# Patient Record
Sex: MALE | Race: WHITE | NOT HISPANIC OR LATINO | Employment: FULL TIME | URBAN - METROPOLITAN AREA
[De-identification: names, ages, dates, MRNs, and addresses within clinical notes are randomized per-mention and may not be internally consistent; named-entity substitution may affect disease eponyms.]

---

## 2017-10-09 ENCOUNTER — OFFICE VISIT - HEALTHEAST (OUTPATIENT)
Dept: FAMILY MEDICINE | Facility: CLINIC | Age: 52
End: 2017-10-09

## 2017-10-09 DIAGNOSIS — K60.2 ANAL FISSURE: ICD-10-CM

## 2017-10-09 DIAGNOSIS — Z00.00 VISIT FOR PREVENTIVE HEALTH EXAMINATION: ICD-10-CM

## 2017-10-09 ASSESSMENT — MIFFLIN-ST. JEOR: SCORE: 1799.02

## 2017-10-30 ENCOUNTER — RECORDS - HEALTHEAST (OUTPATIENT)
Dept: ADMINISTRATIVE | Facility: OTHER | Age: 52
End: 2017-10-30

## 2018-03-29 ENCOUNTER — RECORDS - HEALTHEAST (OUTPATIENT)
Dept: ADMINISTRATIVE | Facility: OTHER | Age: 53
End: 2018-03-29

## 2018-09-11 ENCOUNTER — OFFICE VISIT - HEALTHEAST (OUTPATIENT)
Dept: FAMILY MEDICINE | Facility: CLINIC | Age: 53
End: 2018-09-11

## 2018-09-11 DIAGNOSIS — Z13.29 SCREENING FOR ENDOCRINE, NUTRITIONAL, METABOLIC AND IMMUNITY DISORDER: ICD-10-CM

## 2018-09-11 DIAGNOSIS — Z13.228 SCREENING FOR ENDOCRINE, NUTRITIONAL, METABOLIC AND IMMUNITY DISORDER: ICD-10-CM

## 2018-09-11 DIAGNOSIS — Z00.00 VISIT FOR PREVENTIVE HEALTH EXAMINATION: ICD-10-CM

## 2018-09-11 DIAGNOSIS — Z13.21 SCREENING FOR ENDOCRINE, NUTRITIONAL, METABOLIC AND IMMUNITY DISORDER: ICD-10-CM

## 2018-09-11 DIAGNOSIS — Z13.0 SCREENING FOR ENDOCRINE, NUTRITIONAL, METABOLIC AND IMMUNITY DISORDER: ICD-10-CM

## 2018-09-11 LAB
ALBUMIN SERPL-MCNC: 4.1 G/DL (ref 3.5–5)
ALP SERPL-CCNC: 57 U/L (ref 45–120)
ALT SERPL W P-5'-P-CCNC: 23 U/L (ref 0–45)
ANION GAP SERPL CALCULATED.3IONS-SCNC: 9 MMOL/L (ref 5–18)
AST SERPL W P-5'-P-CCNC: 22 U/L (ref 0–40)
BILIRUB SERPL-MCNC: 0.7 MG/DL (ref 0–1)
BUN SERPL-MCNC: 18 MG/DL (ref 8–22)
CALCIUM SERPL-MCNC: 9.8 MG/DL (ref 8.5–10.5)
CHLORIDE BLD-SCNC: 103 MMOL/L (ref 98–107)
CHOLEST SERPL-MCNC: 194 MG/DL
CO2 SERPL-SCNC: 27 MMOL/L (ref 22–31)
CREAT SERPL-MCNC: 1.02 MG/DL (ref 0.7–1.3)
FASTING STATUS PATIENT QL REPORTED: YES
GFR SERPL CREATININE-BSD FRML MDRD: >60 ML/MIN/1.73M2
GLUCOSE BLD-MCNC: 80 MG/DL (ref 70–125)
HBA1C MFR BLD: 5.2 % (ref 3.5–6)
HDLC SERPL-MCNC: 47 MG/DL
LDLC SERPL CALC-MCNC: 127 MG/DL
POTASSIUM BLD-SCNC: 4.6 MMOL/L (ref 3.5–5)
PROT SERPL-MCNC: 6.9 G/DL (ref 6–8)
PSA SERPL-MCNC: 0.6 NG/ML (ref 0–3.5)
SODIUM SERPL-SCNC: 139 MMOL/L (ref 136–145)
TRIGL SERPL-MCNC: 102 MG/DL
TSH SERPL DL<=0.005 MIU/L-ACNC: 1.37 UIU/ML (ref 0.3–5)

## 2019-03-07 ENCOUNTER — COMMUNICATION - HEALTHEAST (OUTPATIENT)
Dept: FAMILY MEDICINE | Facility: CLINIC | Age: 54
End: 2019-03-07

## 2019-03-08 ENCOUNTER — AMBULATORY - HEALTHEAST (OUTPATIENT)
Dept: NURSING | Facility: CLINIC | Age: 54
End: 2019-03-08

## 2019-09-23 NOTE — PROGRESS NOTES
SUBJECTIVE:   CC: Scottie Eng is an 54 year old male who presents for preventative health visit.     Healthy Habits:     Getting at least 3 servings of Calcium per day:  Yes    Bi-annual eye exam:  Yes    Dental care twice a year:  Yes    Sleep apnea or symptoms of sleep apnea:  Sleep apnea    Diet:  Regular (no restrictions)    Frequency of exercise:  2-3 days/week    Duration of exercise:  Greater than 60 minutes    Taking medications regularly:  Yes    Medication side effects:  None    PHQ-2 Total Score: 0    Additional concerns today:  Yes    He runs and plays hockey.    Possible wart on arm, redness on neck.        Today's PHQ-2 Score:   PHQ-2 (  [employer]) 2019   Q1: Little interest or pleasure in doing things 0   Q2: Feeling down, depressed or hopeless 0   PHQ-2 Score 0   Q1: Little interest or pleasure in doing things Not at all   Q2: Feeling down, depressed or hopeless Not at all   PHQ-2 Score 0       Abuse: Current or Past(Physical, Sexual or Emotional)- No  Do you feel safe in your environment? Yes    Social History     Tobacco Use     Smoking status: Former Smoker     Packs/day: 0.00     Last attempt to quit: 1997     Years since quittin.7     Smokeless tobacco: Never Used   Substance Use Topics     Alcohol use: Not Currently     Alcohol/week: 3.0 standard drinks     Types: 3 Cans of beer per week     Frequency: Never         Alcohol Use 2019   Prescreen: >3 drinks/day or >7 drinks/week? No       Last PSA: No results found for: PSA    Reviewed orders with patient. Reviewed health maintenance and updated orders accordingly - Yes  BP Readings from Last 3 Encounters:   19 132/74   07 136/80    Wt Readings from Last 3 Encounters:   19 90.4 kg (199 lb 4 oz)   07 86.2 kg (190 lb)              Reviewed and updated as needed this visit by clinical staff  Tobacco  Allergies  Meds  Problems  Med Hx  Surg Hx  Fam Hx  Soc Hx          Reviewed and updated as  "needed this visit by Provider  Tobacco  Allergies  Meds  Problems  Med Hx  Surg Hx  Fam Hx  Soc Hx         Past Medical History:   Diagnosis Date     Gant's esophagus     followed by [facility]     GERD (gastroesophageal reflux disease)      STEPHEN (obstructive sleep apnea)     mild by sleep study, used a mandibular advancement device which broke      Past Surgical History:   Procedure Laterality Date     AS REPAIR CRUCIATE LIGAMENT,KNEE Left 2005     C ORAL SURGERY PROCEDURE  1981     SEPTOPLASTY, ENDOSCOPIC SINUS SURGERY, COMBINED  1978     STRIP VEIN         Review of Systems   Constitutional: Negative for chills and fever.   HENT: Negative for congestion, ear pain, hearing loss and sore throat.    Eyes: Negative for pain and visual disturbance.   Respiratory: Negative for cough and shortness of breath.    Cardiovascular: Negative for chest pain, palpitations and peripheral edema.   Gastrointestinal: Positive for heartburn. Negative for abdominal pain, constipation, diarrhea, hematochezia and nausea.   Genitourinary: Negative for discharge, dysuria, frequency, genital sores, hematuria, impotence and urgency.   Musculoskeletal: Negative for arthralgias, joint swelling and myalgias.   Skin: Negative for rash.   Neurological: Negative for dizziness, weakness, headaches and paresthesias.   Psychiatric/Behavioral: Positive for mood changes. The patient is not nervous/anxious.          OBJECTIVE:   /74 (BP Location: Right arm, Patient Position: Chair, Cuff Size: Adult Regular)   Pulse 59   Temp 97.3  F (36.3  C) (Oral)   Resp 16   Ht 1.848 m (6' 0.75\")   Wt 90.4 kg (199 lb 4 oz)   SpO2 95%   BMI 26.47 kg/m      Physical Exam  GENERAL: healthy, alert and no distress  EYES: Eyes grossly normal to inspection, PERRL and conjunctivae and sclerae normal  HENT: ear canals and TM's normal, nose and mouth without ulcers or lesions  NECK: no adenopathy, no asymmetry, masses, or scars and thyroid normal to " "palpation  RESP: lungs clear to auscultation - no rales, rhonchi or wheezes  CV: regular rate and rhythm, normal S1 S2, no S3 or S4, no murmur, click or rub, no peripheral edema and peripheral pulses strong  ABDOMEN: soft, nontender, no hepatosplenomegaly, no masses   MS: no gross musculoskeletal defects noted, no edema  SKIN: no suspicious lesions or rashes, solar changes of skin on neck and forearms, SK vs wart on right forearm.    NEURO: Normal strength and tone, mentation intact and speech normal  PSYCH: mentation appears normal, affect normal/bright        ASSESSMENT/PLAN:       ICD-10-CM    1. Routine general medical examination at a health care facility Z00.00 DERMATOLOGY REFERRAL   2. Gastroesophageal reflux disease, esophagitis presence not specified K21.9 omeprazole (PRILOSEC) 20 MG DR capsule   3. STEPHEN (obstructive sleep apnea) G47.33    4. Lipid screening Z13.220 Lipid panel reflex to direct LDL Fasting   5. Screening for diabetes mellitus Z13.1 GLUCOSE   6. Screening for HIV (human immunodeficiency virus) Z11.4 HIV Screening   7. Need for hepatitis C screening test Z11.59 Hepatitis C Screen Reflex to HCV RNA Quant and Genotype   8. Need for prophylactic vaccination and inoculation against influenza Z23 INFLUENZA QUAD, RECOMBINANT, P-FREE (RIV4) (FLUBLOCK) [60725]     Vaccine Administration, Initial [86110]   9. Solar aging of skin L57.8 DERMATOLOGY REFERRAL       COUNSELING:   Reviewed preventive health counseling, as reflected in patient instructions    Estimated body mass index is 26.47 kg/m  as calculated from the following:    Height as of this encounter: 1.848 m (6' 0.75\").    Weight as of this encounter: 90.4 kg (199 lb 4 oz).  Weight management plan: Discussed healthy diet and exercise guidelines        Counseling Resources:  ATP IV Guidelines  Pooled Cohorts Equation Calculator  FRAX Risk Assessment  ICSI Preventive Guidelines  Dietary Guidelines for Americans, 2010  USDA's MyPlate  ASA " Prophylaxis  Lung CA Screening    Melissa Healy MD  [facility]

## 2019-09-26 ENCOUNTER — OFFICE VISIT (OUTPATIENT)
Dept: FAMILY MEDICINE | Facility: CLINIC | Age: 54
End: 2019-09-26
Payer: COMMERCIAL

## 2019-09-26 VITALS
BODY MASS INDEX: 26.41 KG/M2 | SYSTOLIC BLOOD PRESSURE: 132 MMHG | OXYGEN SATURATION: 95 % | DIASTOLIC BLOOD PRESSURE: 74 MMHG | HEIGHT: 73 IN | RESPIRATION RATE: 16 BRPM | WEIGHT: 199.25 LBS | HEART RATE: 59 BPM | TEMPERATURE: 97.3 F

## 2019-09-26 DIAGNOSIS — Z23 NEED FOR PROPHYLACTIC VACCINATION AND INOCULATION AGAINST INFLUENZA: ICD-10-CM

## 2019-09-26 DIAGNOSIS — L57.8 SOLAR AGING OF SKIN: ICD-10-CM

## 2019-09-26 DIAGNOSIS — K21.9 GASTROESOPHAGEAL REFLUX DISEASE, ESOPHAGITIS PRESENCE NOT SPECIFIED: ICD-10-CM

## 2019-09-26 DIAGNOSIS — G47.33 OSA (OBSTRUCTIVE SLEEP APNEA): ICD-10-CM

## 2019-09-26 DIAGNOSIS — Z11.59 NEED FOR HEPATITIS C SCREENING TEST: ICD-10-CM

## 2019-09-26 DIAGNOSIS — Z11.4 SCREENING FOR HIV (HUMAN IMMUNODEFICIENCY VIRUS): ICD-10-CM

## 2019-09-26 DIAGNOSIS — Z00.00 ROUTINE GENERAL MEDICAL EXAMINATION AT A HEALTH CARE FACILITY: Primary | ICD-10-CM

## 2019-09-26 DIAGNOSIS — Z13.220 LIPID SCREENING: ICD-10-CM

## 2019-09-26 DIAGNOSIS — Z13.1 SCREENING FOR DIABETES MELLITUS: ICD-10-CM

## 2019-09-26 LAB
CHOLEST SERPL-MCNC: 180 MG/DL
GLUCOSE SERPL-MCNC: 97 MG/DL (ref 70–99)
HCV AB SERPL QL IA: NONREACTIVE
HDLC SERPL-MCNC: 49 MG/DL
HIV 1+2 AB+HIV1 P24 AG SERPL QL IA: NONREACTIVE
LDLC SERPL CALC-MCNC: 115 MG/DL
NONHDLC SERPL-MCNC: 131 MG/DL
TRIGL SERPL-MCNC: 79 MG/DL

## 2019-09-26 PROCEDURE — 87389 HIV-1 AG W/HIV-1&-2 AB AG IA: CPT | Performed by: FAMILY MEDICINE

## 2019-09-26 PROCEDURE — 90471 IMMUNIZATION ADMIN: CPT | Performed by: FAMILY MEDICINE

## 2019-09-26 PROCEDURE — 80061 LIPID PANEL: CPT | Performed by: FAMILY MEDICINE

## 2019-09-26 PROCEDURE — 90682 RIV4 VACC RECOMBINANT DNA IM: CPT | Performed by: FAMILY MEDICINE

## 2019-09-26 PROCEDURE — 99386 PREV VISIT NEW AGE 40-64: CPT | Mod: 25 | Performed by: FAMILY MEDICINE

## 2019-09-26 PROCEDURE — 86803 HEPATITIS C AB TEST: CPT | Performed by: FAMILY MEDICINE

## 2019-09-26 PROCEDURE — 82947 ASSAY GLUCOSE BLOOD QUANT: CPT | Performed by: FAMILY MEDICINE

## 2019-09-26 PROCEDURE — 36415 COLL VENOUS BLD VENIPUNCTURE: CPT | Performed by: FAMILY MEDICINE

## 2019-09-26 SDOH — HEALTH STABILITY: MENTAL HEALTH: HOW OFTEN DO YOU HAVE A DRINK CONTAINING ALCOHOL?: NEVER

## 2019-09-26 ASSESSMENT — ENCOUNTER SYMPTOMS
PARESTHESIAS: 0
DYSURIA: 0
EYE PAIN: 0
NERVOUS/ANXIOUS: 0
FEVER: 0
HEADACHES: 0
HEMATURIA: 0
CONSTIPATION: 0
DIARRHEA: 0
JOINT SWELLING: 0
CHILLS: 0
FREQUENCY: 0
NAUSEA: 0
DIZZINESS: 0
PALPITATIONS: 0
MYALGIAS: 0
HEMATOCHEZIA: 0
ABDOMINAL PAIN: 0
WEAKNESS: 0
ARTHRALGIAS: 0
SORE THROAT: 0
SHORTNESS OF BREATH: 0
COUGH: 0
HEARTBURN: 1

## 2019-09-26 ASSESSMENT — MIFFLIN-ST. JEOR: SCORE: 1793.7

## 2019-10-01 NOTE — RESULT ENCOUNTER NOTE
Please send results letter:  Your lab results all look good.  Your LDL (bad) cholesterol is a tad high but overall your lipid panel is quite nice.  Your fasting blood sugar is normal and your HIV and Hepatitis C screening tests are negative.    I am sorry I did not address your mood concerns.  If you continue to have mood concerns please schedule another visit with me so we can discuss that further.  Melissa Healy MD

## 2019-11-12 ENCOUNTER — TRANSFERRED RECORDS (OUTPATIENT)
Dept: HEALTH INFORMATION MANAGEMENT | Facility: CLINIC | Age: 54
End: 2019-11-12

## 2019-12-10 ENCOUNTER — TRANSFERRED RECORDS (OUTPATIENT)
Dept: HEALTH INFORMATION MANAGEMENT | Facility: CLINIC | Age: 54
End: 2019-12-10

## 2020-04-09 ENCOUNTER — VIRTUAL VISIT (OUTPATIENT)
Dept: FAMILY MEDICINE | Facility: CLINIC | Age: 55
End: 2020-04-09
Payer: COMMERCIAL

## 2020-04-09 VITALS — WEIGHT: 200 LBS | HEIGHT: 73 IN | BODY MASS INDEX: 26.51 KG/M2

## 2020-04-09 DIAGNOSIS — J02.9 ACUTE PHARYNGITIS, UNSPECIFIED ETIOLOGY: Primary | ICD-10-CM

## 2020-04-09 PROCEDURE — 99213 OFFICE O/P EST LOW 20 MIN: CPT | Mod: 95 | Performed by: FAMILY MEDICINE

## 2020-04-09 RX ORDER — AMOXICILLIN 500 MG/1
500 CAPSULE ORAL 3 TIMES DAILY
Qty: 30 CAPSULE | Refills: 0 | Status: SHIPPED | OUTPATIENT
Start: 2020-04-09 | End: 2020-04-19

## 2020-04-09 ASSESSMENT — MIFFLIN-ST. JEOR: SCORE: 1801.07

## 2020-04-09 NOTE — PROGRESS NOTES
"Scottie Eng is a 54 year old male who is being evaluated via a billable video visit.      The patient has been notified of following:     \"This video visit will be conducted via a call between you and your physician/provider. We have found that certain health care needs can be provided without the need for an in-person physical exam.  This service lets us provide the care you need with a video conversation.  If a prescription is necessary we can send it directly to your pharmacy.  If lab work is needed we can place an order for that and you can then stop by our lab to have the test done at a later time.    Video visits are billed at different rates depending on your insurance coverage.  Please reach out to your insurance provider with any questions.    If during the course of the call the physician/provider feels a video visit is not appropriate, you will not be charged for this service.\"    Patient has given verbal consent for Video visit? Yes    How would you like to obtain your AVS? E-Mail (inform patient AVS not encrypted)    Patient would like the video invitation sent by: Text to cell phone: [phone]      Subjective     Scottie Eng is a 54 year old male who presents to clinic today for the following health issues:    HPI   Initiates visit for increasing ST x 2 days.  Daughter with ST as well- being treated with amoxicillin for presumed strep.  NO fever or chills.  Increased pain with swallowing.  Tolerating po well.       Video Start Time: 1200p        Patient Active Problem List   Diagnosis     Gant's esophagus     GERD (gastroesophageal reflux disease)     STEPHEN (obstructive sleep apnea)     Past Surgical History:   Procedure Laterality Date     AS REPAIR CRUCIATE LIGAMENT,KNEE Left 2005     C ORAL SURGERY PROCEDURE  1981     SEPTOPLASTY, ENDOSCOPIC SINUS SURGERY, COMBINED  1978     STRIP VEIN         Social History     Tobacco Use     Smoking status: Former Smoker     Packs/day: 0.00 " "    Last attempt to quit:      Years since quittin.2     Smokeless tobacco: Never Used   Substance Use Topics     Alcohol use: Not Currently     Alcohol/week: 3.0 standard drinks     Types: 3 Cans of beer per week     Frequency: Never     Family History   Problem Relation Age of Onset     Ovarian Cancer Mother      Osteoporosis Mother      Cerebrovascular Disease Mother      Esophageal Cancer Father 43     Diabetes Type 2  Brother      Tuberculosis Maternal Grandfather      Myocardial Infarction Paternal Grandmother 80     Alcoholism Paternal Grandfather          Current Outpatient Medications   Medication Sig Dispense Refill     amoxicillin (AMOXIL) 500 MG capsule Take 1 capsule (500 mg) by mouth 3 times daily for 10 days 30 capsule 0     omeprazole (PRILOSEC) 20 MG DR capsule        Allergies   Allergen Reactions     Seasonal Allergies      Recent Labs   Lab Test 19  0931   *   HDL 49   TRIG 79      BP Readings from Last 3 Encounters:   19 132/74   07 136/80    Wt Readings from Last 3 Encounters:   20 90.7 kg (200 lb)   19 90.4 kg (199 lb 4 oz)   07 86.2 kg (190 lb)                      Reviewed and updated as needed this visit by Provider         Review of Systems   ROS COMP: Constitutional, HEENT, cardiovascular, pulmonary, gi and gu systems are negative, except as otherwise noted.      Objective    There were no vitals taken for this visit.  Estimated body mass index is 26.47 kg/m  as calculated from the following:    Height as of 19: 1.848 m (6' 0.75\").    Weight as of 19: 90.4 kg (199 lb 4 oz).  Physical Exam   GENERAL: healthy, alert and no distress  EYES: Eyes grossly normal to inspection, PERRL and conjunctivae and sclerae normal  NECK: no asymmetry or masses  RESP: normal respiratory effort  MS: no gross musculoskeletal defects noted  NEURO: Normal strength and tone, mentation intact and speech normal  PSYCH: mentation appears normal, affect " normal/bright        Assessment & Plan     1. Acute pharyngitis, unspecified etiology    - amoxicillin (AMOXIL) 500 MG capsule; Take 1 capsule (500 mg) by mouth 3 times daily for 10 days  Dispense: 30 capsule; Refill: 0     Will treat empirically in time of pandemic for possible strep.  Continue with increased fluids, rest, NSAIDs/Tylenol prn comfort.  Follow-up if no change or worsening symptoms prn.    Mone Roberts MD  [facility]      Video-Visit Details    Type of service:  Video Visit    Video End Time (time video stopped): 1207- converted to phone as AUDIO could not be heard by me from his computer on either [employer] or [email or web address]    Originating Location (pt. Location): Home    Distant Location (provider location): home    Mode of Communication:  Video Conference via [email or web address]        Mone Roberts MD

## 2020-10-13 ENCOUNTER — TRANSFERRED RECORDS (OUTPATIENT)
Dept: HEALTH INFORMATION MANAGEMENT | Facility: CLINIC | Age: 55
End: 2020-10-13

## 2020-11-02 ENCOUNTER — TRANSFERRED RECORDS (OUTPATIENT)
Dept: HEALTH INFORMATION MANAGEMENT | Facility: CLINIC | Age: 55
End: 2020-11-02

## 2020-12-03 ENCOUNTER — IMMUNIZATION (OUTPATIENT)
Dept: NURSING | Facility: CLINIC | Age: 55
End: 2020-12-03
Payer: COMMERCIAL

## 2020-12-03 DIAGNOSIS — Z23 NEED FOR PROPHYLACTIC VACCINATION AND INOCULATION AGAINST INFLUENZA: Primary | ICD-10-CM

## 2020-12-03 PROCEDURE — 90471 IMMUNIZATION ADMIN: CPT

## 2020-12-03 PROCEDURE — 90682 RIV4 VACC RECOMBINANT DNA IM: CPT

## 2021-03-27 ENCOUNTER — HEALTH MAINTENANCE LETTER (OUTPATIENT)
Age: 56
End: 2021-03-27

## 2021-04-14 ENCOUNTER — OFFICE VISIT (OUTPATIENT)
Dept: NURSING | Facility: CLINIC | Age: 56
End: 2021-04-14
Payer: COMMERCIAL

## 2021-04-14 PROCEDURE — 91300 PR COVID VAC PFIZER DIL RECON 30 MCG/0.3 ML IM: CPT

## 2021-04-14 PROCEDURE — 0001A PR COVID VAC PFIZER DIL RECON 30 MCG/0.3 ML IM: CPT

## 2021-05-05 ENCOUNTER — IMMUNIZATION (OUTPATIENT)
Dept: NURSING | Facility: CLINIC | Age: 56
End: 2021-05-05
Attending: INTERNAL MEDICINE
Payer: COMMERCIAL

## 2021-05-05 PROCEDURE — 91300 PR COVID VAC PFIZER DIL RECON 30 MCG/0.3 ML IM: CPT

## 2021-05-05 PROCEDURE — 0002A PR COVID VAC PFIZER DIL RECON 30 MCG/0.3 ML IM: CPT

## 2021-05-26 ENCOUNTER — RECORDS - HEALTHEAST (OUTPATIENT)
Dept: ADMINISTRATIVE | Facility: CLINIC | Age: 56
End: 2021-05-26

## 2021-05-31 VITALS — WEIGHT: 201 LBS | BODY MASS INDEX: 26.64 KG/M2 | HEIGHT: 73 IN

## 2021-06-02 VITALS — BODY MASS INDEX: 26.79 KG/M2 | WEIGHT: 202.5 LBS

## 2021-06-13 NOTE — PROGRESS NOTES
Assessment:      Healthy male exam.        Plan:     1.  Intermittent musculoskeletal pain, mostly localized in the knees, elbows hips and back, usually better with activity modification, rest as needed and anti-inflammatory.  Mild anxiety while exercising, usually better with deep breathing and relaxation technique.  2. Patient Counseling:  --Nutrition: Stressed importance of moderation in sodium/caffeine intake, saturated fat and cholesterol, caloric balance, sufficient intake of fresh fruits, vegetables, fiber, calcium, iron.  --Discussed the issue of calcium supplement, and the daily use of baby aspirin.  --Exercise: Stressed the importance of regular exercise.   --Substance Abuse: Discussed cessation/primary prevention of tobacco, alcohol, or other drug use; driving or other dangerous activities under the influence; availability of treatment for abuse.    --Sexuality: Discussed sexually transmitted diseases, partner selection, use of condoms, avoidance of unintended pregnancy.  --Injury prevention: Discussed safety belts, safety helmets, smoke detector, smoking near bedding or upholstery.   --Dental health: Discussed importance of regular tooth brushing, flossing, and dental visits.  --Immunizations reviewed.  --Discussed timing and benefits of screening colonoscopy and alternative options.  --After hours service discussed with patient             -- The following high BMI interventions were performed this visit: encouragement to exercise    3. Discussed the patient's BMI with him.  The BMI is above average; BMI management plan is completed  4. Follow up as needed for acute illness     Subjective:      Scottie Eng is a 52 y.o. male who presents for an annual exam. The patient reports that there is not domestic violence in his life.   Occasional musculoskeletal pain, but usually better with rest and anti-inflammatory as needed.  Frequent episode of painful defecation, history of anal fissure, asking to  be seen by proctologist.  Occasional anxiety usually triggered by physical activities with throat tightness, usually better with relaxation and deep breathing.  Reviewed blood work from last year, they were all within acceptable limits.    Healthy Habits:   Regular Exercise: Yes  Sunscreen Use: Yes, has seen the dermatologist in the past.  Healthy Diet: Yes  Dental Visits Regularly: Yes  Seat Belt: Yes  Sexually active: Yes  Monthly Self Testicular Exams:  Yes  Hemoccults: N/A  Flex Sig: N/A  Colonoscopy: Yes  Lipid Profile: Yes  Glucose Screen: Yes  Prevention of Osteoporosis: Yes  Last Dexa: N/A  Guns at Home:  N/A      Immunization History   Administered Date(s) Administered     Hep A, historic 12/22/2009, 11/20/2012     Influenza, seasonal,quad inj 36+ mos 10/07/2016     Influenza,seasonal quad, PF, 36+MOS 11/10/2015     Tdap 12/20/2006     Immunization status: due today.  Vision Screening:both eyes, does wear contact lenses.  Hearing: PASS     Current Outpatient Prescriptions   Medication Sig Dispense Refill     omeprazole (PRILOSEC) 20 MG capsule        No current facility-administered medications for this visit.      Past Medical History:   Diagnosis Date     Gant's esophagus      GERD (gastroesophageal reflux disease)      Past Surgical History:   Procedure Laterality Date     KNEE SURGERY       maxillafacial       maxillofacial       NASAL SEPTUM SURGERY       VARICOSE VEIN SURGERY       Hay fever and allergy relief  Family History   Problem Relation Age of Onset     Esophageal cancer Father      Cervical cancer Mother      Tuberculosis Maternal Grandfather      Diabetes type II Brother      No Medical Problems Sister      No Medical Problems Daughter      No Medical Problems Son      Heart disease Paternal Uncle      Social History     Social History     Marital status:      Spouse name: N/A     Number of children: N/A     Years of education: N/A     Occupational History     Not on file.  "    Social History Main Topics     Smoking status: Former Smoker     Start date: 11/10/1981     Quit date: 11/10/1996     Smokeless tobacco: Never Used     Alcohol use 0.5 oz/week     1 drink(s) per week     Drug use: No     Sexual activity: Yes     Partners: Female     Other Topics Concern     Not on file     Social History Narrative     2 children      No specialty comments available.  Review of Systems  General:  Denies problem  Eyes: Denies problem  Ears/Nose/Throat: Denies problem  Cardiovascular: Denies problem  Respiratory:  Denies problem  Gastrointestinal:  Denies problem  Genitourinary: Denies problem  Musculoskeletal:  Denies problem  Skin: Denies problem  Neurologic: Denies problem  Psychiatric: Denies problem  Endocrine: Denies problem  Heme/Lymphatic: Denies problem   Allergic/Immunologic: Denies problem        Objective:     Vitals:    10/09/17 0803   BP: 122/78   Pulse: 68   Resp: 13   Temp: 98.1  F (36.7  C)   TempSrc: Oral   Weight: 201 lb (91.2 kg)   Height: 6' 0.9\" (1.852 m)     Body mass index is 26.59 kg/(m^2).    Physical  General Appearance: Alert, cooperative, no distress, appears stated age  Head: Normocephalic, without obvious abnormality, atraumatic  Eyes: PERRL, conjunctiva/corneas clear, EOM's intact  Ears: Normal TM's and external ear canals, both ears  Nose: Nares normal, septum midline,mucosa normal, no drainage  Throat: Lips, mucosa, and tongue normal; teeth and gums normal  Neck: Supple, symmetrical, trachea midline, no adenopathy;  thyroid: not enlarged, symmetric, no tenderness/mass/nodules; no carotid bruit or JVD  Back: Symmetric, no curvature, ROM normal, no CVA tenderness  Lungs: Clear to auscultation bilaterally, respirations unlabored  Heart: Regular rate and rhythm, S1 and S2 normal, no murmur, rub, or gallop,  Abdomen: Soft, non-tender, bowel sounds active all four quadrants,  no masses, no organomegaly  Genitourinary: Penis normal. Right and left testis are " descended.No palpable masses.   Rectal: Very small fissure in the anal area around 7:00 area,no digital rectal exam was performed today.  Musculoskeletal: Normal range of motion. No joint swelling or deformity.   Extremities: Extremities normal, atraumatic, no cyanosis or edema  Skin: Skin color, texture, turgor normal, no rashes or lesions  Lymph nodes: Cervical, supraclavicular, and axillary nodes normal  Neurologic: He is alert. He has normal reflexes.   Psychiatric: He has a normal mood and affect.        Snow Wan MD

## 2021-06-20 NOTE — PROGRESS NOTES
Assessment:      Healthy male exam.        Plan:     1.  Mild Gant esophagus followed by a gastroenterologist, controlled with omeprazole.  2. Patient Counseling:  --Nutrition: Stressed importance of moderation in sodium/caffeine intake, saturated fat and cholesterol, caloric balance, sufficient intake of fresh fruits, vegetables, fiber, calcium, iron.  --Discussed the issue of calcium supplement, and the daily use of baby aspirin.  --Exercise: Stressed the importance of regular exercise.   --Substance Abuse: Discussed cessation/primary prevention of tobacco, alcohol, or other drug use; driving or other dangerous activities under the influence; availability of treatment for abuse.    --Sexuality: Discussed sexually transmitted diseases, partner selection, use of condoms, avoidance of unintended pregnancy.  --Injury prevention: Discussed safety belts, safety helmets, smoke detector, smoking near bedding or upholstery.   --Dental health: Discussed importance of regular tooth brushing, flossing, and dental visits.  --Immunizations reviewed.  --Discussed timing and benefits of screening colonoscopy and alternative options.  --After hours service discussed with patient             -- The following high BMI interventions were performed this visit: encouragement to exercise    3. Discussed the patient's BMI with him.  The BMI is above average; BMI management plan is completed  4. Follow up as needed for acute illness     Subjective:      Scottie Eng is a 53 y.o. male who presents for an annual exam. The patient reports that there is not domestic violence in his life.   Usually sees gastroenterology for Gant esophagus, taking omeprazole to control his symptoms with good response .  Healthy Habits:   Regular Exercise: Yes  Sunscreen Use: Yes  Healthy Diet: Yes  Dental Visits Regularly: Yes  Seat Belt: Yes  Sexually active: Yes  Monthly Self Testicular Exams:  Yes  Hemoccults: N/A  Flex Sig: N/A  Colonoscopy:  Yes  Lipid Profile: Yes  Glucose Screen: Yes  Prevention of Osteoporosis: Yes  Last Dexa: N/A  Guns at Home:  N/A      Immunization History   Administered Date(s) Administered     Hep A, Adult IM (19yr & older) 12/22/2009, 11/20/2012     Hep A, historic 12/22/2009, 11/20/2012     Influenza, inj, historic,unspecified 10/07/2016     Influenza, seasonal,quad inj 36+ mos 10/07/2016     Influenza,seasonal quad, PF, 36+MOS 11/10/2015, 10/09/2017, 09/11/2018     Tdap 12/20/2006, 10/09/2017     ZOSTER, RECOMBINANT, IM 09/11/2018     Immunization status: stated as current, but no records available.  Vision Screening:both eyes  Hearing: PASS     Current Outpatient Prescriptions   Medication Sig Dispense Refill     omeprazole (PRILOSEC) 20 MG capsule        No current facility-administered medications for this visit.      Past Medical History:   Diagnosis Date     Gant's esophagus      GERD (gastroesophageal reflux disease)      Past Surgical History:   Procedure Laterality Date     KNEE SURGERY       maxillafacial       maxillofacial       NASAL SEPTUM SURGERY       VARICOSE VEIN SURGERY       Hay fever and allergy relief  Family History   Problem Relation Age of Onset     Esophageal cancer Father      Cervical cancer Mother      Tuberculosis Maternal Grandfather      Diabetes type II Brother      No Medical Problems Sister      No Medical Problems Daughter      No Medical Problems Son      Heart disease Paternal Uncle      Social History     Social History     Marital status:      Spouse name: N/A     Number of children: N/A     Years of education: N/A     Occupational History     Not on file.     Social History Main Topics     Smoking status: Former Smoker     Start date: 11/10/1981     Quit date: 11/10/1996     Smokeless tobacco: Never Used     Alcohol use 0.5 oz/week     1 drink(s) per week     Drug use: No     Sexual activity: Yes     Partners: Female     Other Topics Concern     Not on file     Social History  Narrative     2 children      No specialty comments available.  Review of Systems  General:  Denies problem  Eyes: Denies problem  Ears/Nose/Throat: Denies problem  Cardiovascular: Denies problem  Respiratory:  Denies problem  Gastrointestinal:  Denies problem  Genitourinary: Denies problem  Musculoskeletal:  Denies problem  Skin: Denies problem  Neurologic: Denies problem  Psychiatric: Denies problem  Endocrine: Denies problem  Heme/Lymphatic: Denies problem   Allergic/Immunologic: Denies problem        Objective:     Vitals:    09/11/18 0802   BP: 136/88   Pulse: 81   Resp: 16   SpO2: 95%   Weight: 202 lb 8 oz (91.9 kg)   PainSc: 0-No pain     Body mass index is 26.79 kg/(m^2).    Physical  General Appearance: Alert, cooperative, no distress, appears stated age  Head: Normocephalic, without obvious abnormality, atraumatic  Eyes: PERRL, conjunctiva/corneas clear, EOM's intact  Ears: Normal TM's and external ear canals, both ears  Nose: Nares normal, septum midline,mucosa normal, no drainage  Throat: Lips, mucosa, and tongue normal; teeth and gums normal  Neck: Supple, symmetrical, trachea midline, no adenopathy;  thyroid: not enlarged, symmetric, no tenderness/mass/nodules; no carotid bruit or JVD  Back: Symmetric, no curvature, ROM normal, no CVA tenderness  Lungs: Clear to auscultation bilaterally, respirations unlabored  Heart: Regular rate and rhythm, S1 and S2 normal, no murmur, rub, or gallop,  Abdomen: Soft, non-tender, bowel sounds active all four quadrants,  no masses, no organomegaly  Genitourinary: Penis normal. Right and left testis are descended.No palpable masses.   Rectal: No visible external lesion  Musculoskeletal: Normal range of motion. No joint swelling or deformity.   Extremities: Extremities normal, atraumatic, no cyanosis or edema  Skin: Skin color, texture, turgor normal, no rashes or lesions  Lymph nodes: Cervical, supraclavicular, and axillary nodes normal  Neurologic: He is alert.  He has normal reflexes.   Psychiatric: He has a normal mood and affect.        Snow Wan MD        Scottie was seen today for annual exam.    Diagnoses and all orders for this visit:    Visit for preventive health examination    Screening for endocrine, nutritional, metabolic and immunity disorder  -     Glycosylated Hemoglobin A1c  -     PSA, Annual Screen (Prostatic-Specific Antigen)  -     Comprehensive Metabolic Panel  -     Lipid Cascade FASTING  -     Thyroid Stimulating Hormone (TSH)    Other orders  -     Influenza, Seasonal Quad, Preservative Free 36+ Months  -     Varicella Zoster, Recombinant Vaccine IM; Standing  -     Varicella Zoster, Recombinant Vaccine IM

## 2021-06-24 NOTE — TELEPHONE ENCOUNTER
Patient needs second round of Shingrix vaccine (first round given in October). OK to leave detailed voice message.

## 2021-06-24 NOTE — TELEPHONE ENCOUNTER
Ask pt to call back to make a nurse visit for second shingrix vaccine.  When pt calls, please assist.  Closing encounter.

## 2021-09-05 ENCOUNTER — HEALTH MAINTENANCE LETTER (OUTPATIENT)
Age: 56
End: 2021-09-05

## 2021-10-19 ENCOUNTER — TRANSFERRED RECORDS (OUTPATIENT)
Dept: HEALTH INFORMATION MANAGEMENT | Facility: CLINIC | Age: 56
End: 2021-10-19
Payer: COMMERCIAL

## 2021-11-01 NOTE — PATIENT INSTRUCTIONS
I kindly request that you check your MyChart prior to all appointments with me and complete any assigned questionnaires ahead of time.  (Or you can come a bit early to your appointment and complete questionnaires on one of our tablets.)  This frees up more of our designated visit time to address your health issues. If you have not already done so, I encourage you to sign up for Mychart ([email or web address]).  This will allow you to review your results, securely communicate with your provider care team, and schedule virtual visits as well.    FYI:  I do telehealth (video) visits exclusively on Wednesdays.  I still do in-person visits at [facility] ([phone]) on Mondays, Tuesdays and Thursdays.  You can schedule a video visit for many conditions.  Please follow this link:  [email or web address]    To schedule any ordered imaging studies (including mammogram) you can call [facility] Scheduling at [phone].  If you are scheduling a DEXA (bone density) scan please do NOT schedule this at the [facility].  Please ask that it be done at [facility] in [place].  Other preferred DEXA locations include [place] (at the [facility]), [place], or [place].      Preventive Health Recommendations  Male Ages 50 - 64    Yearly exam:             See your health care provider every year in order to  o   Review health changes.   o   Discuss preventive care.    o   Review your medicines if your doctor has prescribed any.     Have a cholesterol test every 5 years, or more frequently if you are at risk for high cholesterol/heart disease.     Have a diabetes test (fasting glucose) every three years. If you are at risk for diabetes, you should have this test more often.     Have a colonoscopy at age 50, or have a yearly FIT test (stool test). These exams will check for colon cancer.       Talk with your health care provider about whether or not a prostate cancer screening test (PSA) is right for you.    You should be tested each year for STDs (sexually transmitted diseases), if you re at risk.     Shots: Get a flu shot each year. Get a tetanus shot every 10 years.     Nutrition:    Eat at least 5 servings of fruits and vegetables daily.     Eat whole-grain bread, whole-wheat pasta and brown rice instead of white grains and rice.     Get adequate Calcium and Vitamin D.     Lifestyle    Exercise for at least 150 minutes a week (30 minutes a day, 5 days a week). This will help you control your weight and prevent disease.     Limit alcohol to one drink per day.     No smoking.     Wear sunscreen to prevent skin cancer.     See your dentist every six months for an exam and cleaning.     See your eye doctor every 1 to 2 years.

## 2021-11-02 ENCOUNTER — OFFICE VISIT (OUTPATIENT)
Dept: FAMILY MEDICINE | Facility: CLINIC | Age: 56
End: 2021-11-02
Payer: COMMERCIAL

## 2021-11-02 ENCOUNTER — TELEPHONE (OUTPATIENT)
Dept: FAMILY MEDICINE | Facility: CLINIC | Age: 56
End: 2021-11-02

## 2021-11-02 VITALS
HEIGHT: 73 IN | DIASTOLIC BLOOD PRESSURE: 86 MMHG | SYSTOLIC BLOOD PRESSURE: 138 MMHG | OXYGEN SATURATION: 96 % | TEMPERATURE: 97.4 F | BODY MASS INDEX: 25.72 KG/M2 | WEIGHT: 194.08 LBS | HEART RATE: 56 BPM

## 2021-11-02 DIAGNOSIS — M25.551 BILATERAL HIP PAIN: ICD-10-CM

## 2021-11-02 DIAGNOSIS — M25.562 CHRONIC PAIN OF BOTH KNEES: ICD-10-CM

## 2021-11-02 DIAGNOSIS — Z23 NEED FOR PROPHYLACTIC VACCINATION AND INOCULATION AGAINST INFLUENZA: ICD-10-CM

## 2021-11-02 DIAGNOSIS — Z00.00 ROUTINE GENERAL MEDICAL EXAMINATION AT A HEALTH CARE FACILITY: Primary | ICD-10-CM

## 2021-11-02 DIAGNOSIS — G89.29 CHRONIC PAIN OF BOTH KNEES: ICD-10-CM

## 2021-11-02 DIAGNOSIS — M25.552 BILATERAL HIP PAIN: ICD-10-CM

## 2021-11-02 DIAGNOSIS — M25.561 CHRONIC PAIN OF BOTH KNEES: ICD-10-CM

## 2021-11-02 PROCEDURE — 99396 PREV VISIT EST AGE 40-64: CPT | Performed by: FAMILY MEDICINE

## 2021-11-02 ASSESSMENT — ENCOUNTER SYMPTOMS
NERVOUS/ANXIOUS: 0
HEMATOCHEZIA: 0
CONSTIPATION: 0
EYE PAIN: 0
HEMATURIA: 0
PARESTHESIAS: 0
WEAKNESS: 0
FEVER: 0
ABDOMINAL PAIN: 0
DYSURIA: 0
DIZZINESS: 0
HEARTBURN: 0
ARTHRALGIAS: 1
SHORTNESS OF BREATH: 0
MYALGIAS: 0
SORE THROAT: 0
FREQUENCY: 0
DIARRHEA: 0
HEADACHES: 0
PALPITATIONS: 0
JOINT SWELLING: 0
COUGH: 0
NAUSEA: 0
CHILLS: 0

## 2021-11-02 ASSESSMENT — MIFFLIN-ST. JEOR: SCORE: 1764.22

## 2021-11-02 NOTE — PROGRESS NOTES
SUBJECTIVE:   CC: Scottie Eng is an 56 year old male who presents for preventative health visit.     Patient has been advised of split billing requirements and indicates understanding: Yes     Healthy Habits:     Getting at least 3 servings of Calcium per day:  Yes    Bi-annual eye exam:  Yes    Dental care twice a year:  Yes    Sleep apnea or symptoms of sleep apnea:  Daytime drowsiness    Diet:  Regular (no restrictions)    Frequency of exercise:  2-3 days/week    Duration of exercise:  15-30 minutes    Taking medications regularly:  Yes    Medication side effects:  None    PHQ-2 Total Score: 0    Additional concerns today:  No    Today's PHQ-2 Score:   PHQ-2 (  [employer]) 2021   Q1: Little interest or pleasure in doing things 0   Q2: Feeling down, depressed or hopeless 0   PHQ-2 Score 0   Q1: Little interest or pleasure in doing things Not at all   Q2: Feeling down, depressed or hopeless Not at all   PHQ-2 Score 0       Abuse: Current or Past(Physical, Sexual or Emotional)- No  Do you feel safe in your environment? Yes    Have you ever done Advance Care Planning? (For example, a Health Directive, POLST, or a discussion with a medical provider or your loved ones about your wishes): No, advance care planning information given to patient to review.  Patient plans to discuss their wishes with loved ones or provider.      Social History     Tobacco Use     Smoking status: Former Smoker     Packs/day: 0.00     Years: 15.00     Pack years: 0.00     Types: Cigarettes     Start date: 1980     Quit date:      Years since quittin.5     Smokeless tobacco: Former User     Quit date: 1982     Tobacco comment: quit 25 yrs ago   Substance Use Topics     Alcohol use: Yes     Alcohol/week: 3.0 standard drinks     Comment: social     If you drink alcohol do you typically have >3 drinks per day or >7 drinks per week? No    Alcohol Use 2021   Prescreen: >3 drinks/day or >7 drinks/week? No  "  Prescreen: >3 drinks/day or >7 drinks/week? -   No flowsheet data found.    Last PSA:   Prostate Specific Antigen Screen   Date Value Ref Range Status   09/11/2018 0.6 0.0 - 3.5 ng/mL Final       Reviewed orders with patient. Reviewed health maintenance and updated orders accordingly - Yes  BP Readings from Last 3 Encounters:   11/02/21 138/86   09/26/19 132/74   06/17/07 136/80    Wt Readings from Last 3 Encounters:   11/02/21 88 kg (194 lb 1.3 oz)   04/09/20 90.7 kg (200 lb)   09/26/19 90.4 kg (199 lb 4 oz)           Reviewed and updated as needed this visit by clinical staff  Tobacco  Allergies  Meds  Problems   Surg Hx           Reviewed and updated as needed this visit by Provider    Meds  Problems   Surg Hx              Review of Systems   Constitutional: Negative for chills and fever.   HENT: Negative for congestion, ear pain, hearing loss and sore throat.    Eyes: Negative for pain and visual disturbance.   Respiratory: Negative for cough and shortness of breath.    Cardiovascular: Negative for chest pain, palpitations and peripheral edema.   Gastrointestinal: Negative for abdominal pain, constipation, diarrhea, heartburn, hematochezia and nausea.   Genitourinary: Negative for dysuria, frequency, genital sores, hematuria and urgency.   Musculoskeletal: Positive for arthralgias. Negative for joint swelling and myalgias.   Skin: Negative for rash.   Neurological: Negative for dizziness, weakness, headaches and paresthesias.   Psychiatric/Behavioral: Negative for mood changes. The patient is not nervous/anxious.      Hip and knee pain    OBJECTIVE:   /86   Pulse 56   Temp 97.4  F (36.3  C) (Temporal)   Ht 1.854 m (6' 1\")   Wt 88 kg (194 lb 1.3 oz)   SpO2 96%   BMI 25.61 kg/m      Physical Exam  GENERAL: healthy, alert and no distress  EYES: Eyes grossly normal to inspection, conjunctivae and sclerae normal  HENT: ear canals and TM's normal  NECK: no adenopathy, no asymmetry, masses, or " "scars and thyroid normal to palpation  RESP: lungs clear to auscultation - no rales, rhonchi or wheezes  CV: regular rate and rhythm, normal S1 S2, no S3 or S4, no murmur, click or rub, no peripheral edema  ABDOMEN: soft, nontender, no hepatosplenomegaly, no masses  MS: no gross musculoskeletal defects noted, no edema  SKIN: no suspicious lesions or rashes  NEURO: Normal strength and tone, mentation intact and speech normal  PSYCH: mentation appears normal, affect normal/bright       ASSESSMENT/PLAN:       ICD-10-CM    1. Routine general medical examination at a health care facility  Z00.00    2. Need for prophylactic vaccination and inoculation against influenza  Z23    3. Bilateral hip pain  M25.551 Orthopedic  Referral    M25.552    4. Chronic pain of both knees  M25.561 Orthopedic  Referral    M25.562     G89.29        Patient has been advised of split billing requirements and indicates understanding: No  COUNSELING:   Reviewed preventive health counseling, as reflected in patient instructions    Estimated body mass index is 25.61 kg/m  as calculated from the following:    Height as of this encounter: 1.854 m (6' 1\").    Weight as of this encounter: 88 kg (194 lb 1.3 oz).        He reports that he quit smoking about 25 years ago. His smoking use included cigarettes. He started smoking about 41 years ago. He smoked 0.00 packs per day for 15.00 years. He quit smokeless tobacco use about 39 years ago.      Counseling Resources:  ATP IV Guidelines  Pooled Cohorts Equation Calculator  FRAX Risk Assessment  ICSI Preventive Guidelines  Dietary Guidelines for Americans, 2010  USDA's MyPlate  ASA Prophylaxis  Lung CA Screening    Melissa Healy MD  [facility]  "

## 2021-11-02 NOTE — TELEPHONE ENCOUNTER
Never mind.  I see that the EGD report was attached to the colonoscopy report and was not scanned in separately.  Melissa Healy MD

## 2021-11-02 NOTE — TELEPHONE ENCOUNTER
Please call [facility] at [phone] and ask for most recent EGD and pathology report.  Patient states he has had an EGD since the 2016 one we have on record.      Melissa Healy MD

## 2022-01-18 ENCOUNTER — TRANSFERRED RECORDS (OUTPATIENT)
Dept: HEALTH INFORMATION MANAGEMENT | Facility: CLINIC | Age: 57
End: 2022-01-18
Payer: COMMERCIAL

## 2022-04-18 ENCOUNTER — MYC MEDICAL ADVICE (OUTPATIENT)
Dept: FAMILY MEDICINE | Facility: CLINIC | Age: 57
End: 2022-04-18
Payer: COMMERCIAL

## 2022-04-18 NOTE — TELEPHONE ENCOUNTER
DIAGNOSIS: Bilateral hip pain-primary issue /Chronic pain of both knees /Dr Healy/ no images   APPOINTMENT DATE:    NOTES STATUS DETAILS   OFFICE NOTE from referring provider Internal 11.2.21 Dr Melissa Healy, [facility]   MEDICATION LIST Internal

## 2022-04-19 NOTE — TELEPHONE ENCOUNTER
Writer responded via [employer].    ALETA WittN, RN  [facility]

## 2022-04-20 NOTE — TELEPHONE ENCOUNTER
Writer responded via [employer].    ALETA WittN, RN  [facility]

## 2022-04-25 NOTE — PROGRESS NOTES
ASSESSMENT/PLAN:    (M25.551,  M25.552) Greater trochanteric pain syndrome of both lower extremities  (primary encounter diagnosis)  Comment: exam consistent w/ facet/ SI pain in low back and bilateral greater trochanter pain in hips; we deferred imaging today as it would not likely change initial management which is to try PT; f/u in 6-8 wks; if no better, would check plain films and consider injection for trochanter pain   Plan: Physical Therapy Referral          (M54.50,  G89.29) Chronic right-sided low back pain without sciatica  Comment: see above  Plan: Physical Therapy Referral          Phoenix Valdez MD  April 26, 2022  8:25 AM        Pt is a 56 year old male here today for:     Back/Bilateral Hip pain :   Location? Lateral and deep   Duration? 58 years   Injury/ Inciting activity? Had something similar to polio when he was younger. Had been active all his life.    Pop? No    Swelling/Bruising? No    Limited motion? Yes    Snapping/ Clicking? Yes, with external rotation     Giving way/ instability? No    Numbness/Tingling? No   Imaging? No    Treatment? Some stretches off [employer]     Works for [employer]     Past Medical History:   Diagnosis Date     Gant's esophagus     followed by [facility]     GERD (gastroesophageal reflux disease)      STEPHEN (obstructive sleep apnea)     mild by sleep study, used a mandibular advancement device which broke      Past Surgical History:   Procedure Laterality Date     AS REPAIR CRUCIATE LIGAMENT,KNEE Left 2005     COLONOSCOPY  2014, 2017, 2019     SEPTOPLASTY, ENDOSCOPIC SINUS SURGERY, COMBINED  1978     STRIP VEIN       ZZC ORAL SURGERY PROCEDURE  1981      Current Outpatient Medications   Medication Sig Dispense Refill     omeprazole (PRILOSEC) 20 MG DR capsule         Allergies   Allergen Reactions     Seasonal Allergies       ROS:   Gen- no fevers/chills   Rheum - no morning stiffness   Derm - no rash/ redness   Neuro - no numbness, no tingling   Remainder  of ROS negative.     Exam:   There were no vitals taken for this visit.     BACK:   ROM: flexion -full /extension -limited /lateral rotation- full /side bend- full   Bony tenderness: Midline and R SI  Paraspinal tenderness: None.   Sensation: intact in b/l lower extremities.   Strength: 5/5 w/ dorsiflexion/ plantarflexion/ inversion/ eversion/ knee flexion/ extension.   Maneuvers: Neg straight leg raise b/l. Neg Slump b/l Neg LIS   Neuro: DTR's 2+.  Neg Clonus     Bilateral Hip:   Inspection: Swelling - NO; Bruising - NO  ROM: Flexion -full; Extension - full; ER - full symmetric ; IR -limited, symmetric; Abduction -full; Adduction full  Strength: Full in all planes; No pain with resisted motions  Tenderness: Trochanter - POS ASIS - Neg; Inguinal Ligament - Neg; Pubic Symphysis - Neg; Ischial Tuberosity- Neg; Hamstring - very tight; SI Joint - POS on R.   Maneuvers: LIS - Neg; FADIR - POS for lateral pain only; Devang - neg; Grind - POS for lateral pain only; SI joint - Neg; Trendelenburg - POS

## 2022-04-26 ENCOUNTER — PRE VISIT (OUTPATIENT)
Dept: ORTHOPEDICS | Facility: CLINIC | Age: 57
End: 2022-04-26
Payer: COMMERCIAL

## 2022-04-26 ENCOUNTER — OFFICE VISIT (OUTPATIENT)
Dept: ORTHOPEDICS | Facility: CLINIC | Age: 57
End: 2022-04-26
Payer: COMMERCIAL

## 2022-04-26 DIAGNOSIS — G89.29 CHRONIC RIGHT-SIDED LOW BACK PAIN WITHOUT SCIATICA: ICD-10-CM

## 2022-04-26 DIAGNOSIS — M25.551 GREATER TROCHANTERIC PAIN SYNDROME OF BOTH LOWER EXTREMITIES: Primary | ICD-10-CM

## 2022-04-26 DIAGNOSIS — M25.552 GREATER TROCHANTERIC PAIN SYNDROME OF BOTH LOWER EXTREMITIES: Primary | ICD-10-CM

## 2022-04-26 DIAGNOSIS — M54.50 CHRONIC RIGHT-SIDED LOW BACK PAIN WITHOUT SCIATICA: ICD-10-CM

## 2022-04-26 PROCEDURE — 99203 OFFICE O/P NEW LOW 30 MIN: CPT | Performed by: FAMILY MEDICINE

## 2022-04-26 NOTE — LETTER
4/26/2022    RE: Scottie Eng  [address]  [place] [facility] [place] [address]     Dear Colleague,    Thank you for referring your patient, Scottie Eng, to the [facility] [place]. Please see a copy of my visit note below.    ASSESSMENT/PLAN:    (M25.551,  M25.552) Greater trochanteric pain syndrome of both lower extremities  (primary encounter diagnosis)  Comment: exam consistent w/ facet/ SI pain in low back and bilateral greater trochanter pain in hips; we deferred imaging today as it would not likely change initial management which is to try PT; f/u in 6-8 wks; if no better, would check plain films and consider injection for trochanter pain   Plan: Physical Therapy Referral          (M54.50,  G89.29) Chronic right-sided low back pain without sciatica  Comment: see above  Plan: Physical Therapy Referral          Phoenix Valdez MD  April 26, 2022  8:25 AM        Pt is a 56 year old male here today for:     Back/Bilateral Hip pain :   Location? Lateral and deep   Duration? 58 years   Injury/ Inciting activity? Had something similar to polio when he was younger. Had been active all his life.    Pop? No    Swelling/Bruising? No    Limited motion? Yes    Snapping/ Clicking? Yes, with external rotation     Giving way/ instability? No    Numbness/Tingling? No   Imaging? No    Treatment? Some stretches off [employer]     Works for [employer]     Past Medical History:   Diagnosis Date     Gant's esophagus     followed by [facility]     GERD (gastroesophageal reflux disease)      STEPHEN (obstructive sleep apnea)     mild by sleep study, used a mandibular advancement device which broke      Past Surgical History:   Procedure Laterality Date     AS REPAIR CRUCIATE LIGAMENT,KNEE Left 2005     COLONOSCOPY  2014, 2017, 2019     SEPTOPLASTY, ENDOSCOPIC SINUS SURGERY, COMBINED  1978     STRIP VEIN       ZZC ORAL SURGERY PROCEDURE  1981      Current Outpatient Medications    Medication Sig Dispense Refill     omeprazole (PRILOSEC) 20 MG DR capsule         Allergies   Allergen Reactions     Seasonal Allergies       ROS:   Gen- no fevers/chills   Rheum - no morning stiffness   Derm - no rash/ redness   Neuro - no numbness, no tingling   Remainder of ROS negative.     Exam:   There were no vitals taken for this visit.     BACK:   ROM: flexion -full /extension -limited /lateral rotation- full /side bend- full   Bony tenderness: Midline and R SI  Paraspinal tenderness: None.   Sensation: intact in b/l lower extremities.   Strength: 5/5 w/ dorsiflexion/ plantarflexion/ inversion/ eversion/ knee flexion/ extension.   Maneuvers: Neg straight leg raise b/l. Neg Slump b/l Neg LIS   Neuro: DTR's 2+.  Neg Clonus     Bilateral Hip:   Inspection: Swelling - NO; Bruising - NO  ROM: Flexion -full; Extension - full; ER - full symmetric ; IR -limited, symmetric; Abduction -full; Adduction full  Strength: Full in all planes; No pain with resisted motions  Tenderness: Trochanter - POS ASIS - Neg; Inguinal Ligament - Neg; Pubic Symphysis - Neg; Ischial Tuberosity- Neg; Hamstring - very tight; SI Joint - POS on R.   Maneuvers: LIS - Neg; FADIR - POS for lateral pain only; Devang - neg; Grind - POS for lateral pain only; SI joint - Neg; Trendelenburg - POS    Again, thank you for allowing me to participate in the care of your patient.      Sincerely,    Phoenix Valdez MD

## 2022-05-16 ENCOUNTER — THERAPY VISIT (OUTPATIENT)
Dept: PHYSICAL THERAPY | Facility: CLINIC | Age: 57
End: 2022-05-16
Attending: FAMILY MEDICINE
Payer: COMMERCIAL

## 2022-05-16 DIAGNOSIS — M25.551 BILATERAL HIP PAIN: ICD-10-CM

## 2022-05-16 DIAGNOSIS — M25.552 BILATERAL HIP PAIN: ICD-10-CM

## 2022-05-16 DIAGNOSIS — G89.29 CHRONIC RIGHT-SIDED LOW BACK PAIN WITHOUT SCIATICA: ICD-10-CM

## 2022-05-16 DIAGNOSIS — M25.551 GREATER TROCHANTERIC PAIN SYNDROME OF BOTH LOWER EXTREMITIES: ICD-10-CM

## 2022-05-16 DIAGNOSIS — M54.50 CHRONIC RIGHT-SIDED LOW BACK PAIN WITHOUT SCIATICA: ICD-10-CM

## 2022-05-16 DIAGNOSIS — M25.552 GREATER TROCHANTERIC PAIN SYNDROME OF BOTH LOWER EXTREMITIES: ICD-10-CM

## 2022-05-16 PROCEDURE — 97110 THERAPEUTIC EXERCISES: CPT | Mod: GP | Performed by: PHYSICAL THERAPIST

## 2022-05-16 PROCEDURE — 97161 PT EVAL LOW COMPLEX 20 MIN: CPT | Mod: GP | Performed by: PHYSICAL THERAPIST

## 2022-05-16 NOTE — PROGRESS NOTES
Physical Therapy Initial Evaluation  Subjective:  Patient presents to outpatient PT with chronic h/o bilateral hip and low back pain.  Patient reports symptoms similar to polio has a child, never formally diagnosed.  Feels he outgrew those symptoms, but feels he is losing flexibility over time.  Pain has been present for years, but more recently has started to limit sleeping.  Pain usually present on leg that is on top (on either side), pain improves slightly by flexing up hip and knee in this position.  Patient is active, plays hockey and tennis, and walking dog.  Reports nighttime is the most noticeable pain, also feels stiff after sitting for a while.  Patient has been doing some stretches, but not regularly.        The history is provided by the patient. No  was used.   Patient Health History  Scottie Eng being seen for hip pain.     Problem began: 1/1/1969.   Problem occurred: It was a polio like issue when I was young and I have always had sore hips and now with age everything is sore   Pain is reported as 6/10 on pain scale.  General health as reported by patient is fair.  Pertinent medical history includes: history of fractures, overweight, pain at night/rest and sleep disorder/apnea.     Medical allergies: none.   Surgeries include:  Orthopedic surgery. Other surgery history details: left knee ACL in 2004?.    Current medications:  Pain medication.    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   Primary job tasks include:  Computer work and prolonged sitting.                  Therapist Generated HPI Evaluation         Type of problem:  Bilateral hips.    This is a chronic condition.  Condition occurred with:  Insidious onset.  Where condition occurred: for unknown reasons.  Patient reports pain:  Greater trochanter, lateral and anterior.  Pain is described as aching and is intermittent.  Pain radiates to:  No radiation. Pain is worse during the night.  Since onset  symptoms are gradually worsening.  Associated symptoms:  Loss of motion/stiffness. Symptoms are exacerbated by certain positions and other  and relieved by activity/movement.      Restrictions due to condition include:  Working in normal job without restrictions.  Barriers include:  None as reported by patient.                        Objective:    Gait:    Gait Type:  Antalgic                    Lumbar/SI Evaluation    Lumbar Myotomes:    T12-L3 (Hip Flex):  Left: 5    Right: 5  L2-4 (Quads):  Left:  5    Right:  5  L4 (Ankle DF):  Left:  5    Right:  5                                                        Hip Evaluation    Hip Strength:    Flexion:   Left: 5/5   Pain:  Right: 5/5   Pain:                    Extension:  Left: 4/5  Pain:Right: 4+/5    Pain:    Abduction:  Left: 4/5     Pain:Right: 4+/5    Pain:                                   Justin Lumbar Evaluation      Movement Loss:  Flexion (Flex): mod and pain  Extension (EXT): mod  Side Glide R (SG R): min  Side Glide L (SG L): mod and pain  Test Movements:        EIL:   Repeat EIL: During: no effect  After: no effect  Mechanical Response: IncROM          Principle of Treatment:      Extension: will trial repeated lumbar extension, monitor pain response      General Evaluation:          Lower Extremity Flexibility:    Hip External Rotators:  Decreased flexibility    Hip Internal Rotators:  Decreased flexibility     Hip Flexors:  Decreased flexibility      Hamstrings:  Decreased flexibility                                                                  ROS    Assessment/Plan:    Patient is a 56 year old male with both sides hip complaints.    Patient has the following significant findings with corresponding treatment plan.                Diagnosis 1:  Bilateral hip  Pain -  manual therapy, self management, education, directional preference exercise and home program  Decreased ROM/flexibility - manual therapy, therapeutic exercise and home  program  Decreased joint mobility - manual therapy, therapeutic exercise and home program  Decreased strength - therapeutic exercise, therapeutic activities and home program    Therapy Evaluation Codes:   1) History comprised of:   Personal factors that impact the plan of care:      None.    Comorbidity factors that impact the plan of care are:      Overweight, Pain at night/rest and Sleep disorder/apnea.     Medications impacting care: Pain.  2) Examination of Body Systems comprised of:   Body structures and functions that impact the plan of care:      Lumbar spine.   Activity limitations that impact the plan of care are:      Sleeping and Laying down.  3) Clinical presentation characteristics are:   Stable/Uncomplicated.  4) Decision-Making    Low complexity using standardized patient assessment instrument and/or measureable assessment of functional outcome.  Cumulative Therapy Evaluation is: Low complexity.    Previous and current functional limitations:  (See Goal Flow Sheet for this information)    Short term and Long term goals: (See Goal Flow Sheet for this information)     Communication ability:  Patient appears to be able to clearly communicate and understand verbal and written communication and follow directions correctly.  Treatment Explanation - The following has been discussed with the patient:   RX ordered/plan of care  Anticipated outcomes  Possible risks and side effects  This patient would benefit from PT intervention to resume normal activities.   Rehab potential is good.    Frequency:  1 X week, once daily  Duration:  for 6 weeks  Discharge Plan:  Achieve all LTG.  Independent in home treatment program.  Reach maximal therapeutic benefit.    Please refer to the daily flowsheet for treatment today, total treatment time and time spent performing 1:1 timed codes.

## 2022-05-25 ENCOUNTER — THERAPY VISIT (OUTPATIENT)
Dept: PHYSICAL THERAPY | Facility: CLINIC | Age: 57
End: 2022-05-25
Payer: COMMERCIAL

## 2022-05-25 DIAGNOSIS — M25.552 BILATERAL HIP PAIN: Primary | ICD-10-CM

## 2022-05-25 DIAGNOSIS — M25.551 BILATERAL HIP PAIN: Primary | ICD-10-CM

## 2022-05-25 PROCEDURE — 97110 THERAPEUTIC EXERCISES: CPT | Mod: GP | Performed by: PHYSICAL THERAPIST

## 2022-06-01 ENCOUNTER — THERAPY VISIT (OUTPATIENT)
Dept: PHYSICAL THERAPY | Facility: CLINIC | Age: 57
End: 2022-06-01
Payer: COMMERCIAL

## 2022-06-01 DIAGNOSIS — M25.552 BILATERAL HIP PAIN: Primary | ICD-10-CM

## 2022-06-01 DIAGNOSIS — M25.551 BILATERAL HIP PAIN: Primary | ICD-10-CM

## 2022-06-01 PROCEDURE — 97110 THERAPEUTIC EXERCISES: CPT | Mod: GP | Performed by: PHYSICAL THERAPIST

## 2022-06-08 ENCOUNTER — THERAPY VISIT (OUTPATIENT)
Dept: PHYSICAL THERAPY | Facility: CLINIC | Age: 57
End: 2022-06-08
Payer: COMMERCIAL

## 2022-06-08 DIAGNOSIS — M25.552 BILATERAL HIP PAIN: Primary | ICD-10-CM

## 2022-06-08 DIAGNOSIS — M25.551 BILATERAL HIP PAIN: Primary | ICD-10-CM

## 2022-06-08 PROCEDURE — 97110 THERAPEUTIC EXERCISES: CPT | Mod: GP | Performed by: PHYSICAL THERAPIST

## 2022-06-08 PROCEDURE — 97530 THERAPEUTIC ACTIVITIES: CPT | Mod: GP | Performed by: PHYSICAL THERAPIST

## 2022-06-08 NOTE — PROGRESS NOTES
Subjective:  HPI  Physical Exam                    Objective:  System                                                Knee Evaluation:  ROM:    AROM    Hyperextension: Left:  3    Right:  Extension: Left: 0    Right:   Flexion: Left: 110   Right:          Ligament Testing:    Varus 0:  Left:  Neg     Varus 30:  Left:  Neg    Valgus 0:  Left:  Neg    Valgus 30:  Left:  Neg      Anterior Drawer:  Left:  Neg      Posterior Drawer: Left:  Neg    Lachman's:  Left:  Neg      Palpation:    Left knee tenderness present at:  Medial Joint Line              General     ROS    Assessment/Plan:    SUBJECTIVE  Subjective changes as noted by pt: Patient reports he had been doing well until yesterday.  Was playing tennis, and experienced a new injury, feels his left knee may have torn ACL again.  Landed on left leg, and felt instability (not a pop, but excessive movement in knee).   Current pain level: 4/10     Changes in function:  Yes (See Goal flowsheet attached for changes in current functional level)     Adverse reaction to treatment or activity:  None    OBJECTIVE  Changes in objective findings:  Yes, decreased left knee AROM, edema observed in medial knee.        ASSESSMENT  Scottie continues to require intervention to meet STG and LTG's: PT  Patient has experienced an exacerbation of symptoms.  Response to therapy has shown an improvement in  pain level  Progress made towards STG/LTG?  Yes (See Goal flowsheet attached for updates on achievement of STG and LTG)    PLAN  Continue current treatment plan until patient demonstrates readiness to progress to higher level exercises.    PTA/ATC plan:  N/A    Please refer to the daily flowsheet for treatment today, total treatment time and time spent performing 1:1 timed codes.

## 2022-06-15 ENCOUNTER — THERAPY VISIT (OUTPATIENT)
Dept: PHYSICAL THERAPY | Facility: CLINIC | Age: 57
End: 2022-06-15
Payer: COMMERCIAL

## 2022-06-15 DIAGNOSIS — M25.551 BILATERAL HIP PAIN: Primary | ICD-10-CM

## 2022-06-15 DIAGNOSIS — M25.552 BILATERAL HIP PAIN: Primary | ICD-10-CM

## 2022-06-15 PROCEDURE — 97110 THERAPEUTIC EXERCISES: CPT | Mod: GP | Performed by: PHYSICAL THERAPIST

## 2022-06-15 PROCEDURE — 97140 MANUAL THERAPY 1/> REGIONS: CPT | Mod: GP | Performed by: PHYSICAL THERAPIST

## 2022-06-21 NOTE — TELEPHONE ENCOUNTER
DIAGNOSIS: left knee/ ref by PT Brooklynn Mayers/ no imaging, previous surgery 2004   APPOINTMENT DATE: 6.22.22   NOTES STATUS DETAILS   OFFICE NOTE from referring provider Internal PT in Epic   OPERATIVE REPORT In process    MEDICATION LIST Internal

## 2022-06-22 ENCOUNTER — THERAPY VISIT (OUTPATIENT)
Dept: PHYSICAL THERAPY | Facility: CLINIC | Age: 57
End: 2022-06-22
Payer: COMMERCIAL

## 2022-06-22 ENCOUNTER — PRE VISIT (OUTPATIENT)
Dept: ORTHOPEDICS | Facility: CLINIC | Age: 57
End: 2022-06-22

## 2022-06-22 ENCOUNTER — OFFICE VISIT (OUTPATIENT)
Dept: ORTHOPEDICS | Facility: CLINIC | Age: 57
End: 2022-06-22
Payer: COMMERCIAL

## 2022-06-22 ENCOUNTER — ANCILLARY PROCEDURE (OUTPATIENT)
Dept: GENERAL RADIOLOGY | Facility: CLINIC | Age: 57
End: 2022-06-22
Attending: ORTHOPAEDIC SURGERY
Payer: COMMERCIAL

## 2022-06-22 VITALS — WEIGHT: 194 LBS | HEIGHT: 73 IN | BODY MASS INDEX: 25.71 KG/M2

## 2022-06-22 DIAGNOSIS — M25.562 CHRONIC PAIN OF LEFT KNEE: Primary | ICD-10-CM

## 2022-06-22 DIAGNOSIS — M25.562 LEFT KNEE PAIN: Primary | ICD-10-CM

## 2022-06-22 DIAGNOSIS — M25.552 BILATERAL HIP PAIN: Primary | ICD-10-CM

## 2022-06-22 DIAGNOSIS — M25.561 CHRONIC PAIN OF RIGHT KNEE: ICD-10-CM

## 2022-06-22 DIAGNOSIS — M25.562 LEFT KNEE PAIN: ICD-10-CM

## 2022-06-22 DIAGNOSIS — G89.29 CHRONIC PAIN OF RIGHT KNEE: ICD-10-CM

## 2022-06-22 DIAGNOSIS — M25.551 BILATERAL HIP PAIN: Primary | ICD-10-CM

## 2022-06-22 DIAGNOSIS — G89.29 CHRONIC PAIN OF LEFT KNEE: Primary | ICD-10-CM

## 2022-06-22 PROCEDURE — 97110 THERAPEUTIC EXERCISES: CPT | Mod: GP | Performed by: PHYSICAL THERAPIST

## 2022-06-22 PROCEDURE — 99203 OFFICE O/P NEW LOW 30 MIN: CPT | Mod: GC | Performed by: ORTHOPAEDIC SURGERY

## 2022-06-22 PROCEDURE — 73562 X-RAY EXAM OF KNEE 3: CPT | Mod: LT | Performed by: RADIOLOGY

## 2022-06-22 NOTE — PROGRESS NOTES
CHIEF CONCERN: Left knee pain    HISTORY:   Patient is a 56-year-old male with history of prior left ACL reconstruction with an allograft in 2005 with Dr. Hernadez at [facility].  He is he had an acute injury while playing tennis a few weeks ago.  He planted to hit a jamie shot on his left leg and felt immediate pain in the medial aspect of the knee.  He had associated immediate swelling and pain in the medial compartment.  He denies any catching or locking symptoms.  He has intermittent swelling since the time of the injury.  Overall the knee feels slightly better as he is modified his activities to avoid any strenuous activities on the knee.  He did go golfing last Thursday and it felt okay.  He also endorses chronic activity related pain in the knee prior to this injury as well as pain with going up and down stairs, however this was a new acute onset pain and swelling after his tennis injury.  He has not done much in terms of treatment to this point.  He has done activity modification, icing, elevation and oral medications.  He denies any serenity instability in the knee.    PAST MEDICAL HISTORY: (Reviewed with the patient and in the [facility] medical record)  Past Medical History:   Diagnosis Date     Gant's esophagus     followed by [facility]     GERD (gastroesophageal reflux disease)      STEPHEN (obstructive sleep apnea)     mild by sleep study, used a mandibular advancement device which broke       PAST SURGICAL HISTORY: (Reviewed with the patient and in the EPIC medical record)  1. Left knee ACL allograft reconstruction (2005 - Dr. Donaldo Meadows)  Past Surgical History:   Procedure Laterality Date     AS REPAIR CRUCIATE LIGAMENT,KNEE Left 2005     COLONOSCOPY  2014, 2017, 2019     SEPTOPLASTY, ENDOSCOPIC SINUS SURGERY, COMBINED  1978     STRIP VEIN       [facility] ORAL SURGERY PROCEDURE  1981       MEDICATIONS: (Reviewed with the patient and in the [facility] medical record)    Notable medications include:   Current Outpatient  Medications   Medication     omeprazole (PRILOSEC) 20 MG DR capsule     No current facility-administered medications for this visit.       ALLERGIES: (Reviewed with the patient and in the EPIC medical record)  1. Seasonal    SOCIAL HISTORY: (Reviewed with the patient and in the medical record)  --Tobacco: No  --Occupation:   --Avocation/Sport: Tennis, Skiing  Trip to [place] in August camping/portaging    FAMILY HISTORY: (Reviewed with the patient and in the medical record)  -- No family history of bleeding, clotting, or difficulty with anesthesia    REVIEW OF SYSTEMS: (Reviewed with the patient and on the health intake form)  -- A comprehensive 10 point review of systems was conducted and is negative except as noted in the HPI    EXAM:     General: Awake, Alert and Oriented, No acute Distress. Articulate and Interactive    Body mass index is 25.6 kg/m .    Left Lower extremity :    Skin is Warm and Well perfused, no suggestion of infection    Incisions well healed proximal lateral thigh, anteromedial leg, arthroscopic portal incisions    Large effusion    Lachman 1A, stable    +TTP medial joint line, medial patellar facet    No significant TTP lateral joint line    +Patellar crepitus, patellar grind    Stable to varus/valgus stress at 0-30 deg    EHL/FHL/TA/GS 5/5    Sensation intact L3-S1    2+ Dorsalis Pedis Pulse    IMAGING:    Plain Radiographs: XR L knee pending    MRI L knee pending    ASSESSMENT:  1. Left knee pain s/p ACL reconstruction with allograft 2005 (Dr. Donaldo Meadows)    We long discussion with the patient regarding his symptom profile and the differential diagnosis including arthritis flare, meniscus injury, possible but unlikely ACL failure.  In order to gain a better picture of the diagnosis, recommend further imaging with x-ray of the left knee today followed by an MRI without contrast of the left knee.  We will call the patient to discuss the findings on the imaging studies and  develop a treatment plan going forward.  In the meantime, the patient should continue conservative management with ice, elevation, rest, oral medications and activity modification.  The patient was amenable to the plan of care described and all questions were answered.    PLAN:  - Imaging left knee   -- XR L knee today   -- MRI L knee without contrast  - Continue conservative management with rest, ice, elevation, oral NSAIDs and activity modification prior to MRI  - Telephone follow up to discuss imaging results    The patient was seen and discussed with Dr. Carrasquillo, who agrees with the assessment and plan.    Ernesto He MD  Orthopaedic Surgery, PGY5

## 2022-06-22 NOTE — LETTER
6/22/2022         RE: Scottie Eng  [address]  [place] [address]        Dear Colleague,    Thank you for referring your patient, Scottie Eng, to the [facility] [place]. Please see a copy of my visit note below.    CHIEF CONCERN: Left knee pain    HISTORY:   Patient is a 56-year-old male with history of prior left ACL reconstruction with an allograft in 2005 with Dr. Hernadez at [facility].  He is he had an acute injury while playing tennis a few weeks ago.  He planted to hit a jamie shot on his left leg and felt immediate pain in the medial aspect of the knee.  He had associated immediate swelling and pain in the medial compartment.  He denies any catching or locking symptoms.  He has intermittent swelling since the time of the injury.  Overall the knee feels slightly better as he is modified his activities to avoid any strenuous activities on the knee.  He did go golfing last Thursday and it felt okay.  He also endorses chronic activity related pain in the knee prior to this injury as well as pain with going up and down stairs, however this was a new acute onset pain and swelling after his tennis injury.  He has not done much in terms of treatment to this point.  He has done activity modification, icing, elevation and oral medications.  He denies any serenity instability in the knee.    PAST MEDICAL HISTORY: (Reviewed with the patient and in the [facility] medical record)  Past Medical History:   Diagnosis Date     Gant's esophagus     followed by [facility]     GERD (gastroesophageal reflux disease)      STEPHEN (obstructive sleep apnea)     mild by sleep study, used a mandibular advancement device which broke       PAST SURGICAL HISTORY: (Reviewed with the patient and in the [facility] medical record)  1. Left knee ACL allograft reconstruction (2005 - Dr. Fulton Triemily)  Past Surgical History:   Procedure Laterality Date     AS REPAIR CRUCIATE LIGAMENT,KNEE Left 2005      COLONOSCOPY  2014, 2017, 2019     SEPTOPLASTY, ENDOSCOPIC SINUS SURGERY, COMBINED  1978     STRIP VEIN       [facility] ORAL SURGERY PROCEDURE  1981       MEDICATIONS: (Reviewed with the patient and in the EPIC medical record)    Notable medications include:   Current Outpatient Medications   Medication     omeprazole (PRILOSEC) 20 MG DR capsule     No current facility-administered medications for this visit.       ALLERGIES: (Reviewed with the patient and in the EPIC medical record)  1. Seasonal    SOCIAL HISTORY: (Reviewed with the patient and in the medical record)  --Tobacco: No  --Occupation:   --Avocation/Sport: Tennis, Skiing  Trip to [place] in August camping/portaging    FAMILY HISTORY: (Reviewed with the patient and in the medical record)  -- No family history of bleeding, clotting, or difficulty with anesthesia    REVIEW OF SYSTEMS: (Reviewed with the patient and on the health intake form)  -- A comprehensive 10 point review of systems was conducted and is negative except as noted in the HPI    EXAM:     General: Awake, Alert and Oriented, No acute Distress. Articulate and Interactive    Body mass index is 25.6 kg/m .    Left Lower extremity :    Skin is Warm and Well perfused, no suggestion of infection    Incisions well healed proximal lateral thigh, anteromedial leg, arthroscopic portal incisions    Large effusion    Lachman 1A, stable    +TTP medial joint line, medial patellar facet    No significant TTP lateral joint line    +Patellar crepitus, patellar grind    Stable to varus/valgus stress at 0-30 deg    EHL/FHL/TA/GS 5/5    Sensation intact L3-S1    2+ Dorsalis Pedis Pulse    IMAGING:    Plain Radiographs: XR L knee pending    MRI L knee pending    ASSESSMENT:  1. Left knee pain s/p ACL reconstruction with allograft 2005 (Dr. Donaldo Meadows)    We long discussion with the patient regarding his symptom profile and the differential diagnosis including arthritis flare, meniscus injury, possible  but unlikely ACL failure.  In order to gain a better picture of the diagnosis, recommend further imaging with x-ray of the left knee today followed by an MRI without contrast of the left knee.  We will call the patient to discuss the findings on the imaging studies and develop a treatment plan going forward.  In the meantime, the patient should continue conservative management with ice, elevation, rest, oral medications and activity modification.  The patient was amenable to the plan of care described and all questions were answered.    PLAN:  - Imaging left knee   -- XR L knee today   -- MRI L knee without contrast  - Continue conservative management with rest, ice, elevation, oral NSAIDs and activity modification prior to MRI  - Telephone follow up to discuss imaging results    The patient was seen and discussed with Dr. Carrasquillo, who agrees with the assessment and plan.    Ernesto He MD  Orthopaedic Surgery, PGY5          Patient seen and examined with the resident.     Assesment: Specific event while playing tennis including feeling a pop in the knee and now subsequent effusion 20 years status post ACL reconstruction    Plan: At this time I am concerned for a meniscus tear.  I think his ACL graft is intact.  And on top of that I think that the patient does not have significant osteoarthritis on her plain radiographs.  In light of this information we will get an MRI of the knee.  We will follow-up for telephone visit when complete.    I agree with history, physical and imaging as well as the assessment and plan as detailed by Dr. He.         Again, thank you for allowing me to participate in the care of your patient.        Sincerely,        Benny Carrasquillo MD

## 2022-06-22 NOTE — PROGRESS NOTES
Patient seen and examined with the resident.     Assesment: Specific event while playing tennis including feeling a pop in the knee and now subsequent effusion 20 years status post ACL reconstruction    Plan: At this time I am concerned for a meniscus tear.  I think his ACL graft is intact.  And on top of that I think that the patient does not have significant osteoarthritis on her plain radiographs.  In light of this information we will get an MRI of the knee.  We will follow-up for telephone visit when complete.    I agree with history, physical and imaging as well as the assessment and plan as detailed by Dr. He.

## 2022-06-22 NOTE — NURSING NOTE
"Reason For Visit:   Chief Complaint   Patient presents with     Consult     Left knee         ?  No  Occupation .  Currently working? Yes.  Work status?  Full time.  Date of injury: 6/7/22  Type of injury: Tennis injury.  Date of surgery: 2004  Type of surgery: ACL reconstruction.    A few weeks ago he was playing tennis and felt a pain in his knee. The majority of his pain is on the medial knee. He reports instability in the knee. He reports swelling in the knee after this recent injury.    SANE Score  Left Knee: 40  Right Knee: 90    Pain Assessment  Patient Currently in Pain: Yes  Primary Pain Location: Knee    Ht 1.854 m (6' 1\")   Wt 88 kg (194 lb)   BMI 25.60 kg/m           Allergies   Allergen Reactions     Seasonal Allergies        Current Outpatient Medications   Medication     omeprazole (PRILOSEC) 20 MG DR capsule     No current facility-administered medications for this visit.         Stefani Mann, ATC    "

## 2022-06-22 NOTE — PROGRESS NOTES
Subjective:  HPI  Physical Exam                    Objective:  System                                           Hip Evaluation  Hip PROM:    Flexion: Left: moderate restriction   Right: moderate restriction  Extension: Left: min restriction   Right: min restriction      Internal Rotation: Left: significant restriction    Right: Significant restriction  External Rotation: Left: moderate restriction    Right: mod restriction              Hip Strength:    Flexion:   Left: 5/5   Pain:  Right: 5/5   Pain:                    Extension:  Left: 4/5  Pain:Right: 4+/5    Pain:    Abduction:  Left: 4/5     Pain:Right: 4+/5    Pain:                           Knee Evaluation:  ROM:            Strength:         Quad Set Left: Fair    Pain:   Quad Set Right: Good    Pain:        Edema:  Edema of the knee: not measured today, visible edema noted (per patient report, this has improved over the past 2 weeks)              Justin Lumbar Evaluation      Movement Loss:  Flexion (Flex): min  Extension (EXT): min  Side Glide R (SG R): min and pain  Side [place] L (SG L): min and pain                                               ROS    Assessment/Plan:    PROGRESS  REPORT    Progress reporting period is from 5/16/22 to 6/22/22.       SUBJECTIVE  Subjective changes noted by patient:  Patient reports hips have been feeling better this week compared to last, still reports stiffness and intermittent mild anterior pain.       Current pain level is not significantly changed  .     Previous pain level was  6/10 Initial Pain level: 6/10.   Changes in function:  Yes (See Goal flowsheet attached for changes in current functional level)  Adverse reaction to treatment or activity: None    OBJECTIVE  Changes noted in objective findings:  Yes, slight improvement in bilateral hip extension ROM. Continued ROM deficits in bilateral hip IR, ER, flexion.  Continued strength deficits in hip abductors.        ASSESSMENT/PLAN  Updated problem list and treatment  plan: Diagnosis 1:  Bilateral hip  Pain -  home program  Decreased ROM/flexibility - therapeutic exercise and home program  Decreased strength - home program  STG/LTGs have been met or progress has been made towards goals:  Yes (See Goal flow sheet completed today.)  Assessment of Progress: The patient's condition has potential to improve.  Self Management Plans:  Patient is independent in self management of symptoms.  I have re-evaluated this patient and find that the nature, scope, duration and intensity of the therapy is appropriate for the medical condition of the patient.  Scottie continues to require the following intervention to meet STG and LTG's:  PT intervention is no longer required to meet STG/LTG.    Recommendations:  This patient would benefit from further evaluation for left knee pain.  Patient is currently (I) with hip HEP.    Please refer to the daily flowsheet for treatment today, total treatment time and time spent performing 1:1 timed codes.

## 2022-06-28 ENCOUNTER — ANCILLARY PROCEDURE (OUTPATIENT)
Dept: MRI IMAGING | Facility: CLINIC | Age: 57
End: 2022-06-28
Attending: ORTHOPAEDIC SURGERY
Payer: COMMERCIAL

## 2022-06-28 DIAGNOSIS — M25.562 CHRONIC PAIN OF LEFT KNEE: ICD-10-CM

## 2022-06-28 DIAGNOSIS — G89.29 CHRONIC PAIN OF LEFT KNEE: ICD-10-CM

## 2022-06-28 PROCEDURE — 73721 MRI JNT OF LWR EXTRE W/O DYE: CPT | Mod: LT | Performed by: RADIOLOGY

## 2022-06-29 ENCOUNTER — VIRTUAL VISIT (OUTPATIENT)
Dept: ORTHOPEDICS | Facility: CLINIC | Age: 57
End: 2022-06-29
Payer: COMMERCIAL

## 2022-06-29 DIAGNOSIS — G89.29 CHRONIC PAIN OF LEFT KNEE: Primary | ICD-10-CM

## 2022-06-29 DIAGNOSIS — M25.562 CHRONIC PAIN OF LEFT KNEE: Primary | ICD-10-CM

## 2022-06-29 PROCEDURE — 99214 OFFICE O/P EST MOD 30 MIN: CPT | Mod: 95 | Performed by: ORTHOPAEDIC SURGERY

## 2022-06-29 NOTE — LETTER
6/29/2022       RE: Scottie Eng  [address]  [place] [address]      Dear Colleague,    Thank you for referring your patient, Scottie Eng, to the [facility] [place]. Please see a copy of my visit note below.    Nathanael is a 56 year old who is being evaluated via a billable telephone visit.      I had the the opportunity to discuss and complete a telephone visit with the patient today regarding his knee.  No examination was completed as this was a telephone visit.    I saw Nathanael in clinic last week he sustained a specific event to his knee while playing tennis.  He is 20 years status post ACL reconstruction.    MRI was reviewed which shows a displaced bucket-handle tear of the medial meniscus.  His ACL graft is intact.  Some chondrosis is noted and there may be some high-grade fissures though no diffuse arthritis is present.  He certainly does not need knee replacement surgery    I discussed with the patient pros cons risk and benefits of surgery.  We discussed his diagnosis and the potential intervention options.  I discussed with him meniscectomy versus meniscus repair.  I told him that I thought this would likely be a meniscectomy.  I discussed with him the pros cons risk and benefits of surgery.  We discussed the expected course of recovery alternative treatment options.  We will look for time to schedule this to be complete.    Again, thank you for allowing me to participate in the care of your patient.        Sincerely,        Benny Carrasquillo MD

## 2022-06-29 NOTE — PROGRESS NOTES
Nathanael is a 56 year old who is being evaluated via a billable telephone visit.      I had the the opportunity to discuss and complete a telephone visit with the patient today regarding his knee.  No examination was completed as this was a telephone visit.    I saw Nathanael in clinic last week he sustained a specific event to his knee while playing tennis.  He is 20 years status post ACL reconstruction.    MRI was reviewed which shows a displaced bucket-handle tear of the medial meniscus.  His ACL graft is intact.  Some chondrosis is noted and there may be some high-grade fissures though no diffuse arthritis is present.  He certainly does not need knee replacement surgery    I discussed with the patient pros cons risk and benefits of surgery.  We discussed his diagnosis and the potential intervention options.  I discussed with him meniscectomy versus meniscus repair.  I told him that I thought this would likely be a meniscectomy.  I discussed with him the pros cons risk and benefits of surgery.  We discussed the expected course of recovery alternative treatment options.  We will look for time to schedule this to be complete.          What phone number would you like to be contacted at? [phone]  How would you like to obtain your AVS? Amanda  Phone call duration: 10 minutes

## 2022-07-06 ENCOUNTER — TELEPHONE (OUTPATIENT)
Dept: ORTHOPEDICS | Facility: CLINIC | Age: 57
End: 2022-07-06

## 2022-07-06 DIAGNOSIS — M25.561 CHRONIC PAIN OF RIGHT KNEE: Primary | ICD-10-CM

## 2022-07-06 DIAGNOSIS — G89.29 CHRONIC PAIN OF LEFT KNEE: ICD-10-CM

## 2022-07-06 DIAGNOSIS — M25.562 CHRONIC PAIN OF LEFT KNEE: ICD-10-CM

## 2022-07-06 DIAGNOSIS — G89.29 CHRONIC PAIN OF RIGHT KNEE: Primary | ICD-10-CM

## 2022-07-06 NOTE — TELEPHONE ENCOUNTER
Phoned patient to schedule surgery with Dr Carrasquillo. I left him my direct number to call back at his convenience. [phone]

## 2022-07-06 NOTE — TELEPHONE ENCOUNTER
Patient is scheduled for surgery with Dr. Carrasquillo    Spoke with: Patient    Date of Surgery: 8/5/22    Location: [facility]    Post op: 1 week    Pre op with Provider: Complete    H&P: Will schedule with PCP    Pre-procedure COVID-19 Test: Will do home test    Additional imaging/appointments: N/A    Surgery packet: Mailed to patient    Additional comments: N/A

## 2022-07-19 ENCOUNTER — OFFICE VISIT (OUTPATIENT)
Dept: FAMILY MEDICINE | Facility: CLINIC | Age: 57
End: 2022-07-19
Payer: COMMERCIAL

## 2022-07-19 VITALS
BODY MASS INDEX: 26.64 KG/M2 | WEIGHT: 201 LBS | TEMPERATURE: 97.7 F | SYSTOLIC BLOOD PRESSURE: 130 MMHG | HEIGHT: 73 IN | HEART RATE: 59 BPM | DIASTOLIC BLOOD PRESSURE: 80 MMHG | OXYGEN SATURATION: 100 %

## 2022-07-19 DIAGNOSIS — G89.29 CHRONIC PAIN OF LEFT KNEE: ICD-10-CM

## 2022-07-19 DIAGNOSIS — L21.9 SEBORRHEIC DERMATITIS: ICD-10-CM

## 2022-07-19 DIAGNOSIS — G47.33 OSA (OBSTRUCTIVE SLEEP APNEA): ICD-10-CM

## 2022-07-19 DIAGNOSIS — M25.562 CHRONIC PAIN OF LEFT KNEE: ICD-10-CM

## 2022-07-19 DIAGNOSIS — S83.242D ACUTE MEDIAL MENISCUS TEAR OF LEFT KNEE, SUBSEQUENT ENCOUNTER: ICD-10-CM

## 2022-07-19 DIAGNOSIS — Z01.818 PREOP GENERAL PHYSICAL EXAM: Primary | ICD-10-CM

## 2022-07-19 DIAGNOSIS — Z12.5 SCREENING FOR PROSTATE CANCER: ICD-10-CM

## 2022-07-19 DIAGNOSIS — Z23 NEED FOR VACCINATION: ICD-10-CM

## 2022-07-19 DIAGNOSIS — K21.9 GASTROESOPHAGEAL REFLUX DISEASE, UNSPECIFIED WHETHER ESOPHAGITIS PRESENT: ICD-10-CM

## 2022-07-19 LAB
HGB BLD-MCNC: 14.6 G/DL (ref 13.3–17.7)
PSA SERPL-MCNC: 0.69 UG/L (ref 0–4)

## 2022-07-19 PROCEDURE — 0054A COVID-19,PF,PFIZER (12+ YRS): CPT | Performed by: FAMILY MEDICINE

## 2022-07-19 PROCEDURE — G0103 PSA SCREENING: HCPCS | Performed by: FAMILY MEDICINE

## 2022-07-19 PROCEDURE — 36415 COLL VENOUS BLD VENIPUNCTURE: CPT | Performed by: FAMILY MEDICINE

## 2022-07-19 PROCEDURE — 85018 HEMOGLOBIN: CPT | Performed by: FAMILY MEDICINE

## 2022-07-19 PROCEDURE — 91305 COVID-19,PF,PFIZER (12+ YRS): CPT | Performed by: FAMILY MEDICINE

## 2022-07-19 PROCEDURE — 99214 OFFICE O/P EST MOD 30 MIN: CPT | Mod: 25 | Performed by: FAMILY MEDICINE

## 2022-07-19 RX ORDER — HYDROCORTISONE 2.5 %
CREAM (GRAM) TOPICAL 2 TIMES DAILY
Qty: 30 G | Refills: 1 | Status: SHIPPED | OUTPATIENT
Start: 2022-07-19

## 2022-07-19 NOTE — H&P (VIEW-ONLY)
[facility] [address] [place] [address]  Phone: [phone]  Primary Provider: Melissa Healy  Pre-op Performing Provider: MELISSA HEALY      PREOPERATIVE EVALUATION:  Today's date: 7/19/2022    Scottie Eng is a 57 year old male who presents for a preoperative evaluation.    Surgical Information:   Surgery/Procedure: Meniscus Repair left knee  Surgery Location: [facility]   Surgeon: Neida  Surgery Date:08/05/22  Time of Surgery: 11am, Preop 9   Where patient plans to recover: At home with family  Fax number for surgical facility: [phone] (Also on epic)     Type of Anesthesia Anticipated: to be determined    Assessment & Plan     The proposed surgical procedure is considered INTERMEDIATE risk.    Preop general physical exam  Chronic pain of left knee  Acute medial meniscus tear of left knee, subsequent encounter  - Hemoglobin    STEPHEN (obstructive sleep apnea)  untreated    Gastroesophageal reflux disease, unspecified whether esophagitis present  stable/well controlled on PPI and he notes GI symptoms if he misses a dose.  Ok to take omeprazole with sip of water the morning of surgery    Screening for prostate cancer  - PROSTATE SPEC ANTIGEN SCREEN    Seborrheic dermatitis  - hydrocortisone 2.5 % cream  Dispense: 30 g; Refill: 1    Need for vaccination  - COVID-19,PF,PFIZER (12+ Yrs GRAY LABEL)            Risks and Recommendations:  The patient has the following additional risks and recommendations for perioperative complications:   - No identified additional risk factors other than previously addressed    Medication Instructions:  Patient is to take all scheduled medications on the day of surgery    RECOMMENDATION:  APPROVAL GIVEN to proceed with proposed procedure, without further diagnostic evaluation.            Subjective     HPI related to upcoming procedure: Injured left knee playing tennis on June 7.  MRI showed bucket-handle medial meniscal  tear.  He has a history of ACL repair in this knee.    Preop Questions 7/12/2022   1. Have you ever had a heart attack or stroke? No   2. Have you ever had surgery on your heart or blood vessels, such as a stent placement, a coronary artery bypass, or surgery on an artery in your head, neck, heart, or legs? No   3. Do you have chest pain with activity? No   4. Do you have a history of  heart failure? No   5. Do you currently have a cold, bronchitis or symptoms of other infection? No   6. Do you have a cough, shortness of breath, or wheezing? No   7. Do you or anyone in your family have previous history of blood clots? No   8. Do you or does anyone in your family have a serious bleeding problem such as prolonged bleeding following surgeries or cuts? No   9. Have you ever had problems with anemia or been told to take iron pills? No   10. Have you had any abnormal blood loss such as black, tarry or bloody stools? No   11. Have you ever had a blood transfusion? No   12. Are you willing to have a blood transfusion if it is medically needed before, during, or after your surgery? Yes   13. Have you or any of your relatives ever had problems with anesthesia? No   14. Do you have sleep apnea, excessive snoring or daytime drowsiness? YES - doesn't use CPAP   14a. Do you have a CPAP machine? No   15. Do you have any artifical heart valves or other implanted medical devices like a pacemaker, defibrillator, or continuous glucose monitor? No   16. Do you have artificial joints? No   17. Are you allergic to latex? No       Health Care Directive:  Patient does not have a Health Care Directive or Living Will: Discussed advance care planning with patient; information given to patient to review.    Preoperative Review of :   reviewed - no record of controlled substances prescribed.      Status of Chronic Conditions:  See problem list for active medical problems.  Problems all longstanding and stable, except as noted/documented.   See ROS for pertinent symptoms related to these conditions.      Review of Systems  10 point ROS of systems including Constitutional, ENT, Respiratory, Cardiovascular, Gastroenterology, Genitourinary, Integumentary, Musculoskeletal, Neurologic were all negative except for pertinent positives noted.       Patient Active Problem List    Diagnosis Date Noted     Chronic pain of left knee 2022     Priority: Medium     Added automatically from request for surgery [id number]       Bilateral hip pain 2022     Priority: Medium     GERD (gastroesophageal reflux disease) 2019     Priority: Medium     Gant's esophagus      Priority: Medium     Followed by [facility]       STEPHEN (obstructive sleep apnea)      Priority: Medium     mild by sleep study, used a mandibular advancement device which broke        Past Medical History:   Diagnosis Date     Gant's esophagus     followed by [facility]     GERD (gastroesophageal reflux disease)      STEPHEN (obstructive sleep apnea)     mild by sleep study, used a mandibular advancement device which broke     Past Surgical History:   Procedure Laterality Date     AS REPAIR CRUCIATE LIGAMENT,KNEE Left      COLONOSCOPY  , , 2019     SEPTOPLASTY, ENDOSCOPIC SINUS SURGERY, COMBINED  1978     STRIP VEIN       ZZC ORAL SURGERY PROCEDURE       Current Outpatient Medications   Medication Sig Dispense Refill     hydrocortisone 2.5 % cream Apply topically 2 times daily Do not use for more than 10 days on one area of skin. 30 g 1     omeprazole (PRILOSEC) 20 MG DR capsule          Allergies   Allergen Reactions     Seasonal Allergies         Social History     Tobacco Use     Smoking status: Former Smoker     Packs/day: 0.00     Years: 15.00     Pack years: 0.00     Types: Cigarettes     Start date: 1980     Quit date:      Years since quittin.2     Smokeless tobacco: Former User     Quit date: 1982     Tobacco comment: quit 25 yrs ago   Substance Use  "Topics     Alcohol use: Yes     Alcohol/week: 3.0 standard drinks     Comment: social       History   Drug Use Unknown         Objective     /80 (BP Location: Right arm, Patient Position: Sitting)   Pulse 59   Temp 97.7  F (36.5  C) (Temporal)   Ht 1.854 m (6' 1\")   Wt 91.2 kg (201 lb)   SpO2 100%   BMI 26.52 kg/m    BP Readings from Last 3 Encounters:   07/19/22 130/80   11/02/21 138/86   09/26/19 132/74      Physical Exam  GENERAL: healthy, alert and no distress  EYES: Eyes grossly normal to inspection, conjunctivae and sclerae normal  ENT: external ears and nose without lesions or scars, TM's and canals clear bilaterally and oropharynx clear with moist mucus membranes and normal landmarks   NECK: no adenopathy, no asymmetry, masses, or scars and thyroid normal to palpation  RESP: lungs clear to auscultation - no rales, rhonchi or wheezes  CV: regular rate and rhythm, normal S1 S2, no S3 or S4, no murmur, click or rub, no peripheral edema  ABDOMEN: soft, nontender, no hepatosplenomegaly, no masses  MS: no gross musculoskeletal defects noted, no edema  SKIN: no suspicious lesions, erythema of face primarily in the eyebrows and nasolabial folds  NEURO: Normal strength and tone, mentation intact and speech normal  PSYCH: mentation appears normal, affect normal/bright     No results for input(s): HGB, PLT, INR, NA, POTASSIUM, CR, A1C in the last 00726 hours.      Diagnostics:  Recent Results (from the past 24 hour(s))   Hemoglobin    Collection Time: 07/19/22 12:13 PM   Result Value Ref Range    Hemoglobin 14.6 13.3 - 17.7 g/dL      No EKG required, no history of coronary heart disease, significant arrhythmia, peripheral arterial disease or other structural heart disease.    Revised Cardiac Risk Index (RCRI):  The patient has the following serious cardiovascular risks for perioperative complications:   - No serious cardiac risks = 0 points     RCRI Interpretation: 0 points: Class I (very low risk - 0.4% " complication rate)    Signed Electronically by: Melissa Healy MD  Copy of this evaluation report is provided to requesting physician.

## 2022-07-19 NOTE — PROGRESS NOTES
[facility] [address] [place] [address]  Phone: [phone]  Primary Provider: Melissa Healy  Pre-op Performing Provider: MELISSA HEALY      PREOPERATIVE EVALUATION:  Today's date: 7/19/2022    Scottie Eng is a 57 year old male who presents for a preoperative evaluation.    Surgical Information:   Surgery/Procedure: Meniscus Repair left knee  Surgery Location: [facility]   Surgeon: Neida  Surgery Date:08/05/22  Time of Surgery: 11am, Preop 9   Where patient plans to recover: At home with family  Fax number for surgical facility: [phone] (Also on epic)     Type of Anesthesia Anticipated: to be determined    Assessment & Plan     The proposed surgical procedure is considered INTERMEDIATE risk.    Preop general physical exam  Chronic pain of left knee  Acute medial meniscus tear of left knee, subsequent encounter  - Hemoglobin    STEPHEN (obstructive sleep apnea)  untreated    Gastroesophageal reflux disease, unspecified whether esophagitis present  stable/well controlled on PPI and he notes GI symptoms if he misses a dose.  Ok to take omeprazole with sip of water the morning of surgery    Screening for prostate cancer  - PROSTATE SPEC ANTIGEN SCREEN    Seborrheic dermatitis  - hydrocortisone 2.5 % cream  Dispense: 30 g; Refill: 1    Need for vaccination  - COVID-19,PF,PFIZER (12+ Yrs GRAY LABEL)            Risks and Recommendations:  The patient has the following additional risks and recommendations for perioperative complications:   - No identified additional risk factors other than previously addressed    Medication Instructions:  Patient is to take all scheduled medications on the day of surgery    RECOMMENDATION:  APPROVAL GIVEN to proceed with proposed procedure, without further diagnostic evaluation.            Subjective     HPI related to upcoming procedure: Injured left knee playing tennis on June 7.  MRI showed bucket-handle medial meniscal  tear.  He has a history of ACL repair in this knee.    Preop Questions 7/12/2022   1. Have you ever had a heart attack or stroke? No   2. Have you ever had surgery on your heart or blood vessels, such as a stent placement, a coronary artery bypass, or surgery on an artery in your head, neck, heart, or legs? No   3. Do you have chest pain with activity? No   4. Do you have a history of  heart failure? No   5. Do you currently have a cold, bronchitis or symptoms of other infection? No   6. Do you have a cough, shortness of breath, or wheezing? No   7. Do you or anyone in your family have previous history of blood clots? No   8. Do you or does anyone in your family have a serious bleeding problem such as prolonged bleeding following surgeries or cuts? No   9. Have you ever had problems with anemia or been told to take iron pills? No   10. Have you had any abnormal blood loss such as black, tarry or bloody stools? No   11. Have you ever had a blood transfusion? No   12. Are you willing to have a blood transfusion if it is medically needed before, during, or after your surgery? Yes   13. Have you or any of your relatives ever had problems with anesthesia? No   14. Do you have sleep apnea, excessive snoring or daytime drowsiness? YES - doesn't use CPAP   14a. Do you have a CPAP machine? No   15. Do you have any artifical heart valves or other implanted medical devices like a pacemaker, defibrillator, or continuous glucose monitor? No   16. Do you have artificial joints? No   17. Are you allergic to latex? No       Health Care Directive:  Patient does not have a Health Care Directive or Living Will: Discussed advance care planning with patient; information given to patient to review.    Preoperative Review of :   reviewed - no record of controlled substances prescribed.      Status of Chronic Conditions:  See problem list for active medical problems.  Problems all longstanding and stable, except as noted/documented.   See ROS for pertinent symptoms related to these conditions.      Review of Systems  10 point ROS of systems including Constitutional, ENT, Respiratory, Cardiovascular, Gastroenterology, Genitourinary, Integumentary, Musculoskeletal, Neurologic were all negative except for pertinent positives noted.       Patient Active Problem List    Diagnosis Date Noted     Chronic pain of left knee 2022     Priority: Medium     Added automatically from request for surgery [id number]       Bilateral hip pain 2022     Priority: Medium     GERD (gastroesophageal reflux disease) 2019     Priority: Medium     Gant's esophagus      Priority: Medium     Followed by [facility]       STEPHEN (obstructive sleep apnea)      Priority: Medium     mild by sleep study, used a mandibular advancement device which broke        Past Medical History:   Diagnosis Date     Gant's esophagus     followed by [facility]     GERD (gastroesophageal reflux disease)      STEPHEN (obstructive sleep apnea)     mild by sleep study, used a mandibular advancement device which broke     Past Surgical History:   Procedure Laterality Date     AS REPAIR CRUCIATE LIGAMENT,KNEE Left      COLONOSCOPY  , , 2019     SEPTOPLASTY, ENDOSCOPIC SINUS SURGERY, COMBINED  1978     STRIP VEIN       ZZC ORAL SURGERY PROCEDURE       Current Outpatient Medications   Medication Sig Dispense Refill     hydrocortisone 2.5 % cream Apply topically 2 times daily Do not use for more than 10 days on one area of skin. 30 g 1     omeprazole (PRILOSEC) 20 MG DR capsule          Allergies   Allergen Reactions     Seasonal Allergies         Social History     Tobacco Use     Smoking status: Former Smoker     Packs/day: 0.00     Years: 15.00     Pack years: 0.00     Types: Cigarettes     Start date: 1980     Quit date:      Years since quittin.2     Smokeless tobacco: Former User     Quit date: 1982     Tobacco comment: quit 25 yrs ago   Substance Use  "Topics     Alcohol use: Yes     Alcohol/week: 3.0 standard drinks     Comment: social       History   Drug Use Unknown         Objective     /80 (BP Location: Right arm, Patient Position: Sitting)   Pulse 59   Temp 97.7  F (36.5  C) (Temporal)   Ht 1.854 m (6' 1\")   Wt 91.2 kg (201 lb)   SpO2 100%   BMI 26.52 kg/m    BP Readings from Last 3 Encounters:   07/19/22 130/80   11/02/21 138/86   09/26/19 132/74      Physical Exam  GENERAL: healthy, alert and no distress  EYES: Eyes grossly normal to inspection, conjunctivae and sclerae normal  ENT: external ears and nose without lesions or scars, TM's and canals clear bilaterally and oropharynx clear with moist mucus membranes and normal landmarks   NECK: no adenopathy, no asymmetry, masses, or scars and thyroid normal to palpation  RESP: lungs clear to auscultation - no rales, rhonchi or wheezes  CV: regular rate and rhythm, normal S1 S2, no S3 or S4, no murmur, click or rub, no peripheral edema  ABDOMEN: soft, nontender, no hepatosplenomegaly, no masses  MS: no gross musculoskeletal defects noted, no edema  SKIN: no suspicious lesions, erythema of face primarily in the eyebrows and nasolabial folds  NEURO: Normal strength and tone, mentation intact and speech normal  PSYCH: mentation appears normal, affect normal/bright     No results for input(s): HGB, PLT, INR, NA, POTASSIUM, CR, A1C in the last 88002 hours.      Diagnostics:  Recent Results (from the past 24 hour(s))   Hemoglobin    Collection Time: 07/19/22 12:13 PM   Result Value Ref Range    Hemoglobin 14.6 13.3 - 17.7 g/dL      No EKG required, no history of coronary heart disease, significant arrhythmia, peripheral arterial disease or other structural heart disease.    Revised Cardiac Risk Index (RCRI):  The patient has the following serious cardiovascular risks for perioperative complications:   - No serious cardiac risks = 0 points     RCRI Interpretation: 0 points: Class I (very low risk - 0.4% " complication rate)    Signed Electronically by: Melissa Healy MD  Copy of this evaluation report is provided to requesting physician.

## 2022-07-19 NOTE — PATIENT INSTRUCTIONS
If you have MyChart:  1) I kindly request that you check your MyChart prior to all appointments with me and complete any assigned questionnaires ahead of time.    2) You may receive auto-released results from our system before I have the opportunity to review and comment.  Be assured I will review and comment on all of your results as soon as I can.        FYI:  1) I do virtual (video) visits exclusively on Wednesdays.  I still do in-person visits at [facility] ([phone]) on Mondays, Tuesdays and Thursdays.  You can schedule a video visit for many conditions.  Please follow this link:  [email or web address]  2) My schedule has been booking out very far in advance (2 months).  I apologize for the lack of timely access.  If you need to be seen for a chronic condition or preventive (wellness) visit, please be sure to schedule that appointment 2-3 months in advance.  If you have a concern that you feel cannot wait until my next available appointment (such as a hospital follow-up or new symptom of concern) please ask to speak to one of the [facility] nurses who may be able to access a sooner appointment.    I do ask that all patients who are taking chronic medications for conditions that I am managing schedule an in-person visit with me at least once a year.     To schedule any ordered imaging studies (including mammogram and/or DEXA scan) you can call [facility] Scheduling at [phone].      Preparing for Your Surgery  Getting started  A nurse will call you to review your health history and instructions. They will give you an arrival time based on your scheduled surgery time. Please be ready to share:    Your doctor's clinic name and phone number    Your medical, surgical and anesthesia history    A list of allergies and sensitivities    A list of medicines, including herbal treatments and over-the-counter drugs    Whether the patient has a legal  guardian (ask how to send us the papers in advance)  Please tell us if you're pregnant--or if there's any chance you might be pregnant. Some surgeries may injure a fetus (unborn baby), so they require a pregnancy test. Surgeries that are safe for a fetus don't always need a test, and you can choose whether to have one.   If you have a child who's having surgery, please ask for a copy of Preparing for Your Child's Surgery.    Preparing for surgery    Within 30 days of surgery: Have a pre-op exam (sometimes called an H&P, or History and Physical). This can be done at a clinic or pre-operative center.  ? If you're having a , you may not need this exam. Talk to your care team.    At your pre-op exam, talk to your care team about all medicines you take. If you need to stop any medicines before surgery, ask when to start taking them again.  ? We do this for your safety. Many medicines can make you bleed too much during surgery. Some change how well surgery (anesthesia) drugs work.    Call your insurance company to let them know you're having surgery. (If you don't have insurance, call [phone].)    Call your clinic if there's any change in your health. This includes signs of a cold or flu (sore throat, runny nose, cough, rash, fever). It also includes a scrape or scratch near the surgery site.    If you have questions on the day of surgery, call your hospital or surgery center.  COVID testing  You may need to be tested for COVID-19 before having surgery. If so, we will give you instructions.  Eating and drinking guidelines  For your safety: Unless your surgeon tells you otherwise, follow the guidelines below.    Eat and drink as usual until 8 hours before surgery. After that, no food or milk.    Drink clear liquids until 2 hours before surgery. These are liquids you can see through, like water, Gatorade and Propel Water. You may also have black coffee and tea (no cream or milk).    Nothing by mouth within 2  hours of surgery. This includes gum, candy and breath mints.    If you drink alcohol: Stop drinking it the night before surgery.    If your care team tells you to take medicine on the morning of surgery, it's okay to take it with a sip of water.  Preventing infection    Shower or bathe the night before and morning of your surgery. Follow the instructions your clinic gave you. (If no instructions, use regular soap.)    Don't shave or clip hair near your surgery site. We'll remove the hair if needed.    Don't smoke or vape the morning of surgery. You may chew nicotine gum up to 2 hours before surgery. A nicotine patch is okay.  ? Note: Some surgeries require you to completely quit smoking and nicotine. Check with your surgeon.    Your care team will make every effort to keep you safe from infection. We will:  ? Clean our hands often with soap and water (or an alcohol-based hand rub).  ? Clean the skin at your surgery site with a special soap that kills germs.  ? Give you a special gown to keep you warm. (Cold raises the risk of infection.)  ? Wear special hair covers, masks, gowns and gloves during surgery.  ? Give antibiotic medicine, if prescribed. Not all surgeries need antibiotics.  What to bring on the day of surgery    Photo ID and insurance card    Copy of your health care directive, if you have one    Glasses and hearing aides (bring cases)  ? You can't wear contacts during surgery    Inhaler and eye drops, if you use them (tell us about these when you arrive)    CPAP machine or breathing device, if you use them    A few personal items, if spending the night    If you have . . .  ? A pacemaker, ICD (cardiac defibrillator) or other implant: Bring the ID card.  ? An implanted stimulator: Bring the remote control.  ? A legal guardian: Bring a copy of the certified (court-stamped) guardianship papers.  Please remove any jewelry, including body piercings. Leave jewelry and other valuables at home.  If you're going  home the day of surgery    You must have a responsible adult drive you home. They should stay with you overnight as well.    If you don't have someone to stay with you, and you aren't safe to go home alone, we may keep you overnight. Insurance often won't pay for this.  After surgery  If it's hard to control your pain or you need more pain medicine, please call your surgeon's office.  Questions?   If you have any questions for your care team, list them here: _________________________________________________________________________________________________________________________________________________________________________ ____________________________________ ____________________________________ ____________________________________  For informational purposes only. Not to replace the advice of your health care provider. Copyright   2003, 2019 [employer]. All rights reserved. Clinically reviewed by Zaynab Weiss MD. [employer] 244470 - REV 07/21.

## 2022-07-19 NOTE — TELEPHONE ENCOUNTER
Pre-Operative Teaching Flowsheet     Person(s) involved in teaching: Patient     Motivation Level:  Receptive (willing/able to accept information) and asks appropriate questions where applicable: Yes  Any cultural factors/[place] beliefs that may influence understanding or compliance? No     Patient demonstrates understanding of the following:  Pre-operative planning, including the necessary appointments and preparation needed prior to surgery: Yes  Which situations necessitate calling provider and whom to contact: Yes  Pain management techniques pre and post op: Yes  How, and when, to access community resources: Yes    Who will stay/ with patient after surgery: Wife- Rehana Archuleta   Who will drive patient to surgery: Rehana  Patient will do a home covid test           Additional Teaching Concerns Addressed:   Post-operative living arrangements and necessary adaptations to living environment.     Instructional Materials Used/Given: Yes, pre-op packet given including forms for Your surgery day, medications to stop taking before surgery, preparing for surgery, Covid-19 testing, showering before surgery, Stop light tool introduced, Opioid pain medication guideline, pre-op physical form, and map  Patient expressed understanding of all forms given, questions were answered and will review in more detail at home.     Time spent with patient: 20 minutes.

## 2022-08-04 ENCOUNTER — ANESTHESIA EVENT (OUTPATIENT)
Dept: SURGERY | Facility: AMBULATORY SURGERY CENTER | Age: 57
End: 2022-08-04
Payer: COMMERCIAL

## 2022-08-05 ENCOUNTER — ANESTHESIA (OUTPATIENT)
Dept: SURGERY | Facility: AMBULATORY SURGERY CENTER | Age: 57
End: 2022-08-05
Payer: COMMERCIAL

## 2022-08-05 ENCOUNTER — HOSPITAL ENCOUNTER (OUTPATIENT)
Facility: AMBULATORY SURGERY CENTER | Age: 57
Discharge: HOME | End: 2022-08-05
Attending: ORTHOPAEDIC SURGERY
Payer: COMMERCIAL

## 2022-08-05 VITALS
RESPIRATION RATE: 13 BRPM | HEIGHT: 73 IN | HEART RATE: 56 BPM | SYSTOLIC BLOOD PRESSURE: 139 MMHG | WEIGHT: 200 LBS | BODY MASS INDEX: 26.51 KG/M2 | OXYGEN SATURATION: 98 % | DIASTOLIC BLOOD PRESSURE: 92 MMHG | TEMPERATURE: 97 F

## 2022-08-05 DIAGNOSIS — G89.29 CHRONIC PAIN OF LEFT KNEE: ICD-10-CM

## 2022-08-05 DIAGNOSIS — M25.562 CHRONIC PAIN OF LEFT KNEE: ICD-10-CM

## 2022-08-05 PROCEDURE — 29881 ARTHRS KNE SRG MNISECTMY M/L: CPT | Mod: LT | Performed by: ORTHOPAEDIC SURGERY

## 2022-08-05 PROCEDURE — 29881 ARTHRS KNE SRG MNISECTMY M/L: CPT | Mod: LT

## 2022-08-05 RX ORDER — ACETAMINOPHEN 325 MG/1
975 TABLET ORAL ONCE
Status: DISCONTINUED | OUTPATIENT
Start: 2022-08-05 | End: 2022-08-05 | Stop reason: HOSPADM

## 2022-08-05 RX ORDER — SODIUM CHLORIDE, SODIUM LACTATE, POTASSIUM CHLORIDE, CALCIUM CHLORIDE 600; 310; 30; 20 MG/100ML; MG/100ML; MG/100ML; MG/100ML
INJECTION, SOLUTION INTRAVENOUS CONTINUOUS
Status: DISCONTINUED | OUTPATIENT
Start: 2022-08-05 | End: 2022-08-05 | Stop reason: HOSPADM

## 2022-08-05 RX ORDER — PROPOFOL 10 MG/ML
INJECTION, EMULSION INTRAVENOUS CONTINUOUS PRN
Status: DISCONTINUED | OUTPATIENT
Start: 2022-08-05 | End: 2022-08-05

## 2022-08-05 RX ORDER — BUPIVACAINE HYDROCHLORIDE AND EPINEPHRINE 2.5; 5 MG/ML; UG/ML
INJECTION, SOLUTION INFILTRATION; PERINEURAL PRN
Status: DISCONTINUED | OUTPATIENT
Start: 2022-08-05 | End: 2022-08-05 | Stop reason: HOSPADM

## 2022-08-05 RX ORDER — ACETAMINOPHEN 325 MG/1
650 TABLET ORAL
Status: DISCONTINUED | OUTPATIENT
Start: 2022-08-05 | End: 2022-08-06 | Stop reason: HOSPADM

## 2022-08-05 RX ORDER — HYDROMORPHONE HYDROCHLORIDE 1 MG/ML
0.4 INJECTION, SOLUTION INTRAMUSCULAR; INTRAVENOUS; SUBCUTANEOUS EVERY 10 MIN PRN
Status: DISCONTINUED | OUTPATIENT
Start: 2022-08-05 | End: 2022-08-06 | Stop reason: HOSPADM

## 2022-08-05 RX ORDER — KETOROLAC TROMETHAMINE 30 MG/ML
INJECTION, SOLUTION INTRAMUSCULAR; INTRAVENOUS PRN
Status: DISCONTINUED | OUTPATIENT
Start: 2022-08-05 | End: 2022-08-05

## 2022-08-05 RX ORDER — LIDOCAINE HYDROCHLORIDE 20 MG/ML
INJECTION, SOLUTION INFILTRATION; PERINEURAL PRN
Status: DISCONTINUED | OUTPATIENT
Start: 2022-08-05 | End: 2022-08-05

## 2022-08-05 RX ORDER — OXYCODONE HYDROCHLORIDE 5 MG/1
5-10 TABLET ORAL EVERY 4 HOURS PRN
Qty: 10 TABLET | Refills: 0 | Status: SHIPPED | OUTPATIENT
Start: 2022-08-05 | End: 2022-10-10

## 2022-08-05 RX ORDER — AMOXICILLIN 250 MG
1-2 CAPSULE ORAL 2 TIMES DAILY
Qty: 30 TABLET | Refills: 0 | Status: SHIPPED | OUTPATIENT
Start: 2022-08-05 | End: 2022-10-10

## 2022-08-05 RX ORDER — FENTANYL CITRATE 50 UG/ML
50 INJECTION, SOLUTION INTRAMUSCULAR; INTRAVENOUS EVERY 5 MIN PRN
Status: DISCONTINUED | OUTPATIENT
Start: 2022-08-05 | End: 2022-08-06 | Stop reason: HOSPADM

## 2022-08-05 RX ORDER — ACETAMINOPHEN 325 MG/1
650 TABLET ORAL EVERY 4 HOURS PRN
Qty: 50 TABLET | Refills: 0 | Status: SHIPPED | OUTPATIENT
Start: 2022-08-05 | End: 2022-10-10

## 2022-08-05 RX ORDER — KETAMINE HYDROCHLORIDE 10 MG/ML
INJECTION INTRAMUSCULAR; INTRAVENOUS PRN
Status: DISCONTINUED | OUTPATIENT
Start: 2022-08-05 | End: 2022-08-05

## 2022-08-05 RX ORDER — OXYCODONE HYDROCHLORIDE 5 MG/1
5 TABLET ORAL
Status: COMPLETED | OUTPATIENT
Start: 2022-08-05 | End: 2022-08-05

## 2022-08-05 RX ORDER — ALBUTEROL SULFATE 0.83 MG/ML
2.5 SOLUTION RESPIRATORY (INHALATION) EVERY 4 HOURS PRN
Status: DISCONTINUED | OUTPATIENT
Start: 2022-08-05 | End: 2022-08-06 | Stop reason: HOSPADM

## 2022-08-05 RX ORDER — PROPOFOL 10 MG/ML
INJECTION, EMULSION INTRAVENOUS PRN
Status: DISCONTINUED | OUTPATIENT
Start: 2022-08-05 | End: 2022-08-05

## 2022-08-05 RX ORDER — ONDANSETRON 4 MG/1
4 TABLET, ORALLY DISINTEGRATING ORAL EVERY 30 MIN PRN
Status: DISCONTINUED | OUTPATIENT
Start: 2022-08-05 | End: 2022-08-06 | Stop reason: HOSPADM

## 2022-08-05 RX ORDER — CEFAZOLIN SODIUM 2 G/50ML
2 SOLUTION INTRAVENOUS
Status: COMPLETED | OUTPATIENT
Start: 2022-08-05 | End: 2022-08-05

## 2022-08-05 RX ORDER — ONDANSETRON 2 MG/ML
4 INJECTION INTRAMUSCULAR; INTRAVENOUS EVERY 30 MIN PRN
Status: DISCONTINUED | OUTPATIENT
Start: 2022-08-05 | End: 2022-08-06 | Stop reason: HOSPADM

## 2022-08-05 RX ORDER — CEFAZOLIN SODIUM 2 G/50ML
2 SOLUTION INTRAVENOUS SEE ADMIN INSTRUCTIONS
Status: DISCONTINUED | OUTPATIENT
Start: 2022-08-05 | End: 2022-08-05 | Stop reason: HOSPADM

## 2022-08-05 RX ORDER — SODIUM CHLORIDE, SODIUM LACTATE, POTASSIUM CHLORIDE, CALCIUM CHLORIDE 600; 310; 30; 20 MG/100ML; MG/100ML; MG/100ML; MG/100ML
INJECTION, SOLUTION INTRAVENOUS CONTINUOUS PRN
Status: DISCONTINUED | OUTPATIENT
Start: 2022-08-05 | End: 2022-08-05

## 2022-08-05 RX ORDER — HALOPERIDOL 5 MG/ML
1 INJECTION INTRAMUSCULAR
Status: DISCONTINUED | OUTPATIENT
Start: 2022-08-05 | End: 2022-08-06 | Stop reason: HOSPADM

## 2022-08-05 RX ORDER — GABAPENTIN 300 MG/1
300 CAPSULE ORAL
Status: COMPLETED | OUTPATIENT
Start: 2022-08-05 | End: 2022-08-05

## 2022-08-05 RX ORDER — ONDANSETRON 2 MG/ML
INJECTION INTRAMUSCULAR; INTRAVENOUS PRN
Status: DISCONTINUED | OUTPATIENT
Start: 2022-08-05 | End: 2022-08-05

## 2022-08-05 RX ORDER — HYDROXYZINE PAMOATE 25 MG/1
25 CAPSULE ORAL 3 TIMES DAILY PRN
Qty: 10 CAPSULE | Refills: 0 | Status: SHIPPED | OUTPATIENT
Start: 2022-08-05 | End: 2022-10-10

## 2022-08-05 RX ORDER — HYDROXYZINE HYDROCHLORIDE 25 MG/1
25 TABLET, FILM COATED ORAL
Status: DISCONTINUED | OUTPATIENT
Start: 2022-08-05 | End: 2022-08-06 | Stop reason: HOSPADM

## 2022-08-05 RX ORDER — MEPERIDINE HYDROCHLORIDE 25 MG/ML
12.5 INJECTION INTRAMUSCULAR; INTRAVENOUS; SUBCUTANEOUS
Status: DISCONTINUED | OUTPATIENT
Start: 2022-08-05 | End: 2022-08-06 | Stop reason: HOSPADM

## 2022-08-05 RX ORDER — LIDOCAINE 40 MG/G
CREAM TOPICAL
Status: DISCONTINUED | OUTPATIENT
Start: 2022-08-05 | End: 2022-08-05 | Stop reason: HOSPADM

## 2022-08-05 RX ORDER — ONDANSETRON 4 MG/1
4 TABLET, ORALLY DISINTEGRATING ORAL
Status: DISCONTINUED | OUTPATIENT
Start: 2022-08-05 | End: 2022-08-06 | Stop reason: HOSPADM

## 2022-08-05 RX ORDER — OXYCODONE HYDROCHLORIDE 5 MG/1
5 TABLET ORAL EVERY 4 HOURS PRN
Status: DISCONTINUED | OUTPATIENT
Start: 2022-08-05 | End: 2022-08-06 | Stop reason: HOSPADM

## 2022-08-05 RX ORDER — ACETAMINOPHEN 325 MG/1
975 TABLET ORAL ONCE
Status: COMPLETED | OUTPATIENT
Start: 2022-08-05 | End: 2022-08-05

## 2022-08-05 RX ORDER — DEXAMETHASONE SODIUM PHOSPHATE 4 MG/ML
INJECTION, SOLUTION INTRA-ARTICULAR; INTRALESIONAL; INTRAMUSCULAR; INTRAVENOUS; SOFT TISSUE PRN
Status: DISCONTINUED | OUTPATIENT
Start: 2022-08-05 | End: 2022-08-05

## 2022-08-05 RX ADMIN — ONDANSETRON 4 MG: 2 INJECTION INTRAMUSCULAR; INTRAVENOUS at 07:18

## 2022-08-05 RX ADMIN — PROPOFOL 200 MG: 10 INJECTION, EMULSION INTRAVENOUS at 07:18

## 2022-08-05 RX ADMIN — Medication 0.5 MG: at 07:16

## 2022-08-05 RX ADMIN — KETAMINE HYDROCHLORIDE 20 MG: 10 INJECTION INTRAMUSCULAR; INTRAVENOUS at 07:26

## 2022-08-05 RX ADMIN — CEFAZOLIN SODIUM 2 G: 2 SOLUTION INTRAVENOUS at 07:12

## 2022-08-05 RX ADMIN — DEXAMETHASONE SODIUM PHOSPHATE 4 MG: 4 INJECTION, SOLUTION INTRA-ARTICULAR; INTRALESIONAL; INTRAMUSCULAR; INTRAVENOUS; SOFT TISSUE at 07:18

## 2022-08-05 RX ADMIN — KETOROLAC TROMETHAMINE 30 MG: 30 INJECTION, SOLUTION INTRAMUSCULAR; INTRAVENOUS at 07:57

## 2022-08-05 RX ADMIN — SODIUM CHLORIDE, SODIUM LACTATE, POTASSIUM CHLORIDE, CALCIUM CHLORIDE: 600; 310; 30; 20 INJECTION, SOLUTION INTRAVENOUS at 07:12

## 2022-08-05 RX ADMIN — GABAPENTIN 300 MG: 300 CAPSULE ORAL at 06:24

## 2022-08-05 RX ADMIN — PROPOFOL 200 MCG/KG/MIN: 10 INJECTION, EMULSION INTRAVENOUS at 07:18

## 2022-08-05 RX ADMIN — ACETAMINOPHEN 975 MG: 325 TABLET ORAL at 06:23

## 2022-08-05 RX ADMIN — OXYCODONE HYDROCHLORIDE 5 MG: 5 TABLET ORAL at 08:33

## 2022-08-05 RX ADMIN — KETAMINE HYDROCHLORIDE 10 MG: 10 INJECTION INTRAMUSCULAR; INTRAVENOUS at 07:53

## 2022-08-05 RX ADMIN — LIDOCAINE HYDROCHLORIDE 100 MG: 20 INJECTION, SOLUTION INFILTRATION; PERINEURAL at 07:18

## 2022-08-05 NOTE — OP NOTE
PREOPERATIVE DIAGNOSIS: Left knee medial meniscus tear. S/p ACL reconstruction with intact graft  POSTOPERATIVE DIAGNOSIS: Left knee medial meniscus tear. S/p ACL reconstruction with intact graft  PROCEDURES:   1. Examination under anesthesia, left knee.   2. Left knee arthroscopy, partial medial meniscectomy.   SURGEON: Benny Carrasquillo MD   ASSISTANT: Sadie Garces PA-C. The assistance of Ms. Garces was necessary for positioning, arthroscopic visualization, retraction, and meniscectomy.  OPERATIVE INDICATIONS: Scottie is a very pleasant 57 year old male who presented to my clinic with medial joint line pain. An MRI had been obtained by his primary care physician demonstrating a displaced medial meniscus tear, His ACL graft was intact on MRI imaging and I felt that his knee was stable, I was unsure if this was a repairable meniscus tear or not so we offered a repair vs meniscectomy. I reviewed with him his history, physical and imaging exam. I felt that he would be a candidate for knee arthroscopy, partial medial meniscectomy vs repair. He was apprised of the risks and benefits of surgery and desired to proceed despite the risks.   OPERATIVE DETAILS: In the preoperative area, the patient's informed consent was reviewed and he desired to proceed. Left knee was marked. The patient was in agreement. he was taken to the operating room, timeout was performed and all parties were in agreement. Preoperative antibiotics was given within 1 hour of time of surgery. He was placed supine on the operating room table, surrendered to LMA anesthesia and examination under anesthesia was performed with the following findings: Full passive range of motion 0 to 135 degrees. No patellar instability. Stable to varus and valgus stress at 0 and 30 degrees. Stable anterior, posterior drawer. No pivot shift. Negative dial test. Posterior drawer 0. Lachman 0. At this time, a bump was placed underneath the ipsilateral hip. Egg crate was  placed beneath the well leg. No tourniquet was placed. A side post was utilized. Left leg was prepped and draped in the usual sterile fashion. Standard anteromedial and anterolateral arthroscopic portals were created and diagnostic arthroscopy was performed with the following findings: Medial patellar facet was Grade 1, lateral patellar facet was Grade 1, central patellar ridge was Grade 1, central trochlea was Grade 3, medial femoral condyle was Grade 2, medial tibial plateau was normal, lateral femoral condyle was Grade 3, lateral tibial plateau was Grade 1. Lateral meniscus was intact. Medial meniscus had a displaced tear, the substance of the torn meniscus fragment was clearly not healthy and we planned for a meniscectomy. ACL and PCL were intact. Popliteus tendon intact.    Alternating then between a motorized shaver and a small biter, a partial medial meniscectomy was performed until a balanced stable rim of meniscal tissue remained. At this time, all excess arthroscopic fluid and meniscal debris was removed from the knee joint. Copious irrigation was performed. Portal sites were closed with nylon. Sterile dressings were applied. The patient was awakened from anesthesia and transferred to the recovery room in stable condition with stable vital signs.   COMPLICATIONS: None apparent.   DRAINS: None.   SPECIMENS: None.   ESTIMATED BLOOD LOSS: 5 mL.   TOURNIQUET: No tourniquet was placed.  POSTOPERATIVE PLAN: The patient will be weightbearing as tolerated, range of motion as tolerated, can shower on postoperative day #3. No submerging the wounds. No chemical DVT prophylaxis. Follow up in my Orthopedic Clinic in 1 week time. No running, jumping, pounding sports for 6 weeks.

## 2022-08-05 NOTE — ANESTHESIA CARE TRANSFER NOTE
Patient: Scottie Eng    Procedure: Procedure(s):  left knee examination under anesthesia, knee arthroscopy, meniscectomy       Diagnosis: Chronic pain of left knee [M25.562, G89.29]  Diagnosis Additional Information: No value filed.    Anesthesia Type:   General     Note:    Oropharynx: spontaneously breathing and oral airway in place  Level of Consciousness: drowsy  Oxygen Supplementation: face mask  Level of Supplemental Oxygen (L/min / FiO2): 6  Independent Airway: airway patency satisfactory and stable  Dentition: dentition unchanged  Vital Signs Stable: post-procedure vital signs reviewed and stable  Report to RN Given: handoff report given  Patient transferred to: PACU  Comments: Resps easy and regular. Report to PACU RN  Handoff Report: Identifed the Patient, Identified the Reponsible Provider, Reviewed the pertinent medical history, Discussed the surgical course, Reviewed Intra-OP anesthesia mangement and issues during anesthesia, Set expectations for post-procedure period and Allowed opportunity for questions and acknowledgement of understanding      Vitals:  Vitals Value Taken Time   /82 08/05/22 0830   Temp 35.9  C (96.7  F) 08/05/22 0830   Pulse 53 08/05/22 0837   Resp 9 08/05/22 0837   SpO2 97 % 08/05/22 0837   Vitals shown include unvalidated device data.    Electronically Signed By: TOMASZ EVANGELISTA CRNA  August 5, 2022  9:26 AM

## 2022-08-05 NOTE — ANESTHESIA PREPROCEDURE EVALUATION
Anesthesia Pre-Procedure Evaluation    Patient: Scottie Eng   MRN: [id number] : 1965        Procedure : Procedure(s):  left knee examination under anesthesia, knee arthroscopy, meniscectomy versus meniscus repair          Past Medical History:   Diagnosis Date     Gant's esophagus     followed by [facility]     GERD (gastroesophageal reflux disease)      STEPHEN (obstructive sleep apnea)     mild by sleep study, used a mandibular advancement device which broke      Past Surgical History:   Procedure Laterality Date     AS REPAIR CRUCIATE LIGAMENT,KNEE Left 2005     COLONOSCOPY  2014, 2017, 2019     SEPTOPLASTY, ENDOSCOPIC SINUS SURGERY, COMBINED  1978     STRIP VEIN       ZZC ORAL SURGERY PROCEDURE        Allergies   Allergen Reactions     Seasonal Allergies       Social History     Tobacco Use     Smoking status: Former Smoker     Packs/day: 0.00     Years: 15.00     Pack years: 0.00     Types: Cigarettes     Start date: 1980     Quit date:      Years since quittin.3     Smokeless tobacco: Former User     Quit date: 1982     Tobacco comment: quit 25 yrs ago   Substance Use Topics     Alcohol use: Yes     Alcohol/week: 3.0 standard drinks     Comment: social      Wt Readings from Last 1 Encounters:   22 90.7 kg (200 lb)        Anesthesia Evaluation   Pt has had prior anesthetic. Type: General.    History of anesthetic complications       ROS/MED HX  ENT/Pulmonary:     (+) sleep apnea, doesn't use CPAP,     Neurologic:  - neg neurologic ROS     Cardiovascular:  - neg cardiovascular ROS     METS/Exercise Tolerance:     Hematologic:  - neg hematologic  ROS     Musculoskeletal:  - neg musculoskeletal ROS     GI/Hepatic:     (+) GERD,     Renal/Genitourinary:  - neg Renal ROS     Endo:  - neg endo ROS     Psychiatric/Substance Use:  - neg psychiatric ROS     Infectious Disease:  - neg infectious disease ROS     Malignancy:       Other:            Physical Exam    Airway  airway  exam normal           Respiratory Devices and Support         Dental  no notable dental history         Cardiovascular   cardiovascular exam normal          Pulmonary   pulmonary exam normal                OUTSIDE LABS:  CBC:   Lab Results   Component Value Date    WBC 7.2 08/10/2008    HGB 14.6 07/19/2022    HGB 15.0 08/10/2008    HCT 43.9 08/10/2008     08/10/2008     BMP:   Lab Results   Component Value Date     09/11/2018     08/10/2008    POTASSIUM 4.6 09/11/2018    POTASSIUM 3.9 08/10/2008    CHLORIDE 103 09/11/2018    CHLORIDE 110 (H) 08/10/2008    CO2 27 09/11/2018    CO2 23 08/10/2008    BUN 18 09/11/2018    BUN 12 08/10/2008    CR 1.02 09/11/2018    CR 0.70 08/10/2008    GLC 97 09/26/2019    GLC 80 09/11/2018     COAGS: No results found for: PTT, INR, FIBR  POC: No results found for: BGM, HCG, HCGS  HEPATIC:   Lab Results   Component Value Date    ALBUMIN 4.1 09/11/2018    PROTTOTAL 6.9 09/11/2018    ALT 23 09/11/2018    AST 22 09/11/2018    ALKPHOS 57 09/11/2018    BILITOTAL 0.7 09/11/2018     OTHER:   Lab Results   Component Value Date    A1C 5.2 09/11/2018    DEVANTE 9.8 09/11/2018    TSH 1.37 09/11/2018       Anesthesia Plan    ASA Status:  2   NPO Status:  NPO Appropriate    Anesthesia Type: General.     - Airway: LMA   Induction: Intravenous.   Maintenance: TIVA.        Consents    Anesthesia Plan(s) and associated risks, benefits, and realistic alternatives discussed. Questions answered and patient/representative(s) expressed understanding.     - Discussed: Risks, Benefits and Alternatives for BOTH SEDATION and the PROCEDURE were discussed     - Discussed with:  Patient         Postoperative Care    Pain management: Oral pain medications.   PONV prophylaxis: Ondansetron (or other 5HT-3), Dexamethasone or Solumedrol     Comments:                Keagan Mora MD, MD

## 2022-08-05 NOTE — INTERVAL H&P NOTE
"I have reviewed the surgical (or preoperative) H&P that is linked to this encounter, and examined the patient. There are no significant changes    Clinical Conditions Present on Arrival:  Clinically Significant Risk Factors Present on Admission                   # Overweight: Estimated body mass index is 26.39 kg/m  as calculated from the following:    Height as of this encounter: 1.854 m (6' 1\").    Weight as of this encounter: 90.7 kg (200 lb).       "

## 2022-08-05 NOTE — DISCHARGE INSTRUCTIONS
ARTHROSCOPY, DEBRIDEMENT, MENISECTOMY, OR CHONDROPLASTY POST OPERATIVE INSTRUCTIONS     WBAT WITHOUT BRACE PROTOCOL      FOLLOW UP APPOINTMENT  A follow-up appointment with Dr. Carrasquillo should be scheduled approximately one to two weeks after surgery. If your appointment was not scheduled prior to surgery, please call [phone].    Your follow up appointment will be at the location that you regularly see Dr. Carrasquillo:    [facility]  [address], [place] [address] [phone]    [address] [address]  [phone]    Physical therapy:   Physical therapy should begin 3-5 days after surgery. An order for physical therapy will be provided by Dr. Carrasquillo's office but it will be your responsibility to schedule the first appointment at the location of your choice.     ACTIVITY  Weight bearing status:   You will be allowed to weight bear as tolerated on your operative leg using assistive devices (crutches) as needed.     Exercises:   Perform the following exercises at least three times per day for the first four weeks after surgery to prevent complications, such as blood clots in your legs:  1) Point and flex your feet  2) Move your ankle around in big circles  3) Wiggle your toes   Also, perform thigh muscle tightening exercises for 10 to 15 minutes at least three times per day for the first four weeks after surgery.    Athletic Activities:  Activities such as swimming, bicycling, jogging, running, and stop-and-go sports should be avoided until permitted by your provider.    Driving:  You may return to driving once the following criteria have been met: 1) all narcotic pain medication has been discontinued, 2) you have full control over both lower extremities. Please contact Dr. Carrasquillo's office with any questions.    Return to Work:  Return to work as soon as possible.  Your  ability to work depends on a number of factors - your level of discomfort and how much demand your job puts on your knees.  If you have any questions, please call Dr. Carrasquillo's office.      COMFORT AND PAIN MANAGEMENT  Elevation:   During times of inactivity throughout the first two weeks after surgery, make an effort to decrease swelling by elevating your operative extremity. This is most effectively done by lying down and placing several pillows lengthwise under your thigh and calf to raise your toes above the level of your nose. To ensure that your knee remains in full extension, do not place pillows directly under your knee.     Icing:  An ice pack will be provided to control swelling and discomfort after surgery. Place a thin towel on your skin and apply the ice pack overtop. You may apply ice for 20 minutes as often as two times per hour.    Pain Medications:  You will be discharged with acetaminophen (Tylenol) and a narcotic medication for pain management after surgery. Acetaminophen is most effective when it is taken per the schedule outlined by your provider (every four, six, or eight hours as prescribed). You may safely use acetaminophen as prescribed for the first four weeks after surgery provided you do not exceed the maximum daily dose prescribed by your provider (usually 3000 mg - 4000 mg). The narcotic pain medication should only be taken on an as-needed basis when necessary and should be reserved for severe pain that is not controlled with scheduled acetaminophen. In the first three days following surgery, your symptoms may warrant use of the narcotic pain medication every three, four, or six hours as prescribed. After three days, focus your efforts on decreasing (tapering) use of narcotic medications.   The most successful tapering strategy is to first, decrease the dose (number of tablets) and second, increase the interval (time in between doses). For example, if you begin taking two tablets every  four hours after surgery, start your taper by decreasing one of these doses to one tablet. Every one to two days, decrease another dose to one tablet until you are eventually taking one tablet every four hours. Once this is achieved, focus on increasing the number of hours between doses, moving from one tablet every four hours to one tablet every six hours. As tolerated, continue to increase the interval to eight and twelve hours. Eventually, taper to one dose every evening and discontinue when no longer needed.      ANTICOAGULATION  Depending on your risk factors, your provider may prescribe aspirin to prevent blood clots. If prescribed, take aspirin daily for the first four weeks after surgery.      WOUND CARE AND SHOWERING  Wound care:  Keep the initial post-op dressing on, clean, and dry for the first three days after surgery. 72 hours after surgery, you may remove the dressing. Your surgical incisions were closed with steristrips (small white tape that is directly on the incision areas) that should be left on until they fall off or are removed at your first office visit. Ok to leave incision open to air after dressing is removed. Do not apply any lotions, creams, or ointments to the surgical site. All sutures will also be removed at your first office visit. Under no circumstance should you pick or scratch your incision.    Showering:  You may shower on the third day after surgery (immediatly after the dressing is removed) provided your incision is intact and dry without drainage. You may allow water to run over the incision, but do not soak or submerge the incision. Do not scrub the incision.     Tub Bathing:  Tub bathing, swimming, or any other activities that cause your incision to be submerged should be avoided until allowed by your provider. Typically, patients are allowed to return to these activities four weeks after surgery.      CONTACTING YOUR PHYSICIAN:  You may experience symptoms that require  follow-up before your scheduled appointment. Please contact Dr. Carrasquillo's office if you experience:  1) Pain in your knee that persists or worsens in the first few days after surgery  2) Excessive redness or drainage of cloudy or bloody material from the wounds (clear red tinted fluid and some mild drainage should be expected) or drainage of any kind five days after surgery  3) A temperature elevation greater than 101.5 F   4) Pain, swelling or redness in your calf  5) Numbness or weakness in your leg or foot      Regular business hours (Monday - Friday, 8am - 5pm):  [facility]: [phone]  [facility]: [phone]    After hours and weekends:  [facility] on call [facility]opedic resident: [phone]    In general, the best strategy is to take (if you are able to tolerate it) the tylenol and ibuprofen on a regular basis until your pain has largely gone away. You can take the narcotic pain medicine as needed in addition to the tylenol and ibuprofen. As your pain begins to lessen, you should cut back on your narcotic use while continuing to take your regular tylenol and ibuprofen doses.        Safety Tips for Using Crutches    Crutch Fit:  Assume good standing posture with shoulders relaxed and crutch tips 6-8 inches out from the side of the foot.  The underarm pad should fall 2-3 fingers width below the armpit.  The handgrip is positioned level with the wrist to allow 30  flexion at the elbow.    Safety Tips:  Bear weight on your hands, not on your armpits.  Do not add extra padding to the underarm pad. This will, in effect, lengthen the crutches and increase risk of nerve injury.  Wear flat, properly fitting shoes. Do not walk in stocking feet, high heels or slippers.  Household hazards:  --Throw rugs should be removed from floors.  --Stairs should be cleared of obstacles.  --Use extra caution on slippery, highly  "polished, littered or uneven floor surfaces.  --Check for electric cords.  Check crutch tips for excessive wear and keep wing nuts tight.  While walking, look forward with  head up  and  eyes open.  Take equal length steps.  Use BOTH crutches.    Stairs Sequence:  UP: \"Good\" leg first, followed by  bad  leg, then crutches.  DOWN: Crutches, followed by  bad  leg, \"good\" leg.     Walking with Crutches:  Move both crutches forward at the same time.  Non-Weight Bearing (NWB):  Hold the involved leg up and swing through the crutches with the involved leg. The involved leg does not touch the floor.  Toe Touch Weight Bearing (TTWB): Move the involved leg forward. Rest it lightly on the floor for balance only. Step through the crutches with the uninvolved leg.  Partial Weight Bearing (PWB): Move the involved leg forward. Step down the weight of the leg only.  Step through the crutches with the uninvolved leg.  Weight Bearing As Tolerated (WBAT): Move the involved leg forward. Put as much pressure through the involved leg as you can tolerate comfortably. Then step through the crutches with the uninvolved leg.  [facility]  Home Care Following Anesthesia  For 24 hours after surgery:  Get plenty of rest.  A responsible adult must stay with you for at least 24 hours after you leave the surgery center.  Do not drive or use heavy equipment.  If you have weakness or tingling, don't drive or use heavy equipment until this feeling goes away.   Do not drink alcohol.   Avoid strenuous or risky activities.  Ask for help when climbing stairs.  You may feel lightheaded.  IF so, sit for a few minutes before standing.  Have someone help you get up.   If you have nausea (feel sick to your stomach): Drink only clear liquids such as apple juice, ginger ale, broth or 7-Up.  Rest may also help.  Be sure to drink enough fluids.  Move to a regular diet as you feel able.   You may have a slight fever.  Call the " doctor if your fever is over 100 F (37.7 C) (taken under the tongue) or lasts longer than 24 hours.  You may have a dry mouth, a sore throat, muscle aches or trouble sleeping. These should go away after 24 hours.  Do not make important or legal decisions.   It is recommended to avoid smoking.               Tips for taking pain medications  To get the best pain relief possible, remember these points:  Take pain medications as directed, before pain becomes severe.  Pain medication can upset your stomach: taking it with food may help.  Constipation is a common side effect of pain medication. Drink plenty of  fluids.  Eat foods high in fiber. Take a stool softener if recommended by your doctor or pharmacist.  Do not drink alcohol, drive or operate machinery while taking pain medications.  Ask about other ways to control pain, such as with heat, ice or relaxation.    Tylenol/Acetaminophen Consumption  To help encourage the safe use of acetaminophen, the makers of TYLENOL  have lowered the maximum daily dose for single-ingredient Extra Strength TYLENOL  (acetaminophen) products sold in the [place] from 8 pills per day (4,000 mg) to 6 pills per day (3,000 mg). The dosing interval has also changed from 2 pills every 4-6 hours to 2 pills every 6 hours.  If you feel your pain relief is insufficient, you may take Tylenol/Acetaminophen in addition to your narcotic pain medication.   Be careful not to exceed 3,000 mg of Tylenol/Acetaminophen in a 24 hour period from all sources.  If you are taking extra strength Tylenol/acetaminophen (500 mg), the maximum dose is 6 tablets in 24 hours.  If you are taking regular strength acetaminophen (325 mg), the maximum dose is 9 tablets in 24 hours.    Call a doctor for any of the following:  Signs of infection (fever, growing tenderness at the surgery site, a large amount of drainage or bleeding, severe pain, foul-smelling drainage, redness, swelling).  It has been over 8 to 10 hours since  surgery and you are still not able to urinate (pass water).  Headache for over 24 hours.  Numbness, tingling or weakness the day after surgery (if you had spinal anesthesia).  Signs of Covid-19 infection (temperature over 100 degrees, shortness of breath, cough, loss of taste/smell, generalized body aches, persistent headache, chills, sore throat, nausea/vomiting/diarrhea)  Your doctor is:       Dr. Benny Carrasquillo, Orthopaedics: [phone]               Or dial [phone] and ask for the resident on call for:  Orthopaedics  For emergency care, call the:  [facility] Emergency Department: [phone] (TTY for hearing impaired: [phone])

## 2022-08-05 NOTE — ANESTHESIA POSTPROCEDURE EVALUATION
Patient: Scottie Eng    Procedure: Procedure(s):  left knee examination under anesthesia, knee arthroscopy, meniscectomy       Anesthesia Type:  General    Note:  Disposition: Outpatient   Postop Pain Control: Uneventful            Sign Out: Well controlled pain   PONV: No   Neuro/Psych: Uneventful            Sign Out: Acceptable/Baseline neuro status   Airway/Respiratory: Uneventful            Sign Out: Acceptable/Baseline resp. status   CV/Hemodynamics: Uneventful            Sign Out: Acceptable CV status; No obvious hypovolemia; No obvious fluid overload   Other NRE: NONE   DID A NON-ROUTINE EVENT OCCUR? No           Last vitals:  Vitals Value Taken Time   /82 08/05/22 0830   Temp 35.9  C (96.7  F) 08/05/22 0830   Pulse 53 08/05/22 0837   Resp 9 08/05/22 0837   SpO2 97 % 08/05/22 0837   Vitals shown include unvalidated device data.    Electronically Signed By: Keagan Mora MD, MD  August 5, 2022  9:56 AM

## 2022-08-09 NOTE — PROGRESS NOTES
Physical Therapy Initial Evaluation: Subjective History  Date of Surgery: 8/5/22.    Surgical Procedure/Limb: Left knee arthroscopy, partial medial meniscectomy  Surgeon Name: Dr. Carrasquillo   Precautions/Restrictions: WBAT, ROM as tolerated, no running, jumping, pounding sports for 6 weeks.     Average Daily Pain Levels: 2/10 (Location: incisions; Quality: Aching/Throbbing and Tender, stretching)  Other Symptoms: swelling  Symptom Mgmt Strategies: tylenol PM, acetaminophen     Prior orthopaedic history/procedures: L ACL reconstruction with allograft 2004.   Prior non-operative management: PT, activity modification, icing, oral medications  Next MD Appt Date: 8/15/22    Functional limitations following procedure: limited mobility with stairs (descend>ascend), gait, transfers   Previous level of function: chronic activity related pain in knee, pain going up and down stairs    Patient Employment:   Desired Physical Activity (Goals): recreational tennis, hockey, hiking, canoeing       Knee Evaluation    DVT Assessment: Wells Criteria- bold indicates present   Tenderness in Calf Leg pain    Pitting Edema  Red and Hot Skin    Fever  Calf/Ankle Swelling     Incision Observation: Clean and Dry    Gait: WBAT without gait aid. Knee flexion at initial contact, decreased heel strike  Transfers: independent    Knee ROM Extension Flexion   Left 4  122   Right 3 hyper 140       Edema: (cm)  Joint Line 5cm above 15cm above   Left 40 42 46   Right 38 41 48   Stroke test for knee effusion: 3+      Knee MMT Quadriceps set Straight Leg Raise   Left Good Quad lag   Right Good Good        Palpation  Left: Tenderness to palpation fat pad   Right: No tenderness to palpation    Assessment/Plan:  Patient is a 57 year old male with left side knee complaints.    Patient has the following significant findings with corresponding treatment plan.                Diagnosis 1:  S/p L arthroscopy, partial medial meniscectomy     Pain -   hot/cold therapy, electric stimulation, manual therapy, splint/taping/bracing/orthotics, self management, education and home program  Decreased ROM/flexibility - manual therapy, therapeutic exercise and home program  Decreased joint mobility - manual therapy, therapeutic exercise and home program  Decreased strength - therapeutic exercise, therapeutic activities and home program  Decreased proprioception - neuro re-education, therapeutic activities and home program  Edema - vasopneumatics and self management/home program  Impaired gait - gait training and home program  Decreased function - therapeutic activities and home program    Therapy Evaluation Codes:   1) History comprised of:   Personal factors that impact the plan of care:      Past/current experiences.    Comorbidity factors that impact the plan of care are:      None.     Medications impacting care: Pain.  2) Examination of Body Systems comprised of:   Body structures and functions that impact the plan of care:      Knee.   Activity limitations that impact the plan of care are:      Bending, Running, Sports, Squatting/kneeling, Stairs and Walking.  3) Clinical presentation characteristics are:   Stable/Uncomplicated.  4) Decision-Making    Low complexity using standardized patient assessment instrument and/or measureable assessment of functional outcome.  Cumulative Therapy Evaluation is: Low complexity.    Previous and current functional limitations:  (See Goal Flow Sheet for this information)    Short term and Long term goals: (See Goal Flow Sheet for this information)     Communication ability:  Patient appears to be able to clearly communicate and understand verbal and written communication and follow directions correctly.  Treatment Explanation - The following has been discussed with the patient:   RX ordered/plan of care  Anticipated outcomes  Possible risks and side effects  This patient would benefit from PT intervention to resume normal  activities.   Rehab potential is good.    Frequency:  1 X week, once daily  Duration:  for 6 weeks tapering to 2 X a month over 2 months  Discharge Plan:  Achieve all LTG.  Independent in home treatment program.  Reach maximal therapeutic benefit.    Please refer to the daily flowsheet for treatment today, total treatment time and time spent performing 1:1 timed codes.      Answers for HPI/ROS submitted by the patient on 8/3/2022  Reason for Visit:: post op visit  When problem began:: 6/7/2022  How problem occurred:: tennis injury  Number scale: 2/10  General health as reported by patient: good  Please check all that apply to your current or past medical history: history of fractures  Medical allergies: none  Surgeries: orthopedic surgery  Medications you are currently taking: pain medication  Occupation::   What are your primary job tasks: computer work

## 2022-08-10 ENCOUNTER — THERAPY VISIT (OUTPATIENT)
Dept: PHYSICAL THERAPY | Facility: CLINIC | Age: 57
End: 2022-08-10
Attending: ORTHOPAEDIC SURGERY
Payer: COMMERCIAL

## 2022-08-10 DIAGNOSIS — M25.562 CHRONIC PAIN OF LEFT KNEE: ICD-10-CM

## 2022-08-10 DIAGNOSIS — Z47.89 AFTERCARE FOLLOWING SURGERY OF THE MUSCULOSKELETAL SYSTEM: Primary | ICD-10-CM

## 2022-08-10 DIAGNOSIS — G89.29 CHRONIC PAIN OF LEFT KNEE: ICD-10-CM

## 2022-08-10 PROCEDURE — 97530 THERAPEUTIC ACTIVITIES: CPT | Mod: GP

## 2022-08-10 PROCEDURE — 97110 THERAPEUTIC EXERCISES: CPT | Mod: 59

## 2022-08-10 PROCEDURE — 97164 PT RE-EVAL EST PLAN CARE: CPT | Mod: GP

## 2022-08-10 PROCEDURE — 97140 MANUAL THERAPY 1/> REGIONS: CPT | Mod: 59

## 2022-08-10 ASSESSMENT — ACTIVITIES OF DAILY LIVING (ADL)
STIFFNESS: I HAVE THE SYMPTOM BUT IT DOES NOT AFFECT MY ACTIVITY
LIMPING: I HAVE THE SYMPTOM BUT IT DOES NOT AFFECT MY ACTIVITY
KNEE_ACTIVITY_OF_DAILY_LIVING_SCORE: 82.86
WEAKNESS: I HAVE THE SYMPTOM BUT IT DOES NOT AFFECT MY ACTIVITY
GO DOWN STAIRS: ACTIVITY IS SOMEWHAT DIFFICULT
PAIN: I HAVE THE SYMPTOM BUT IT DOES NOT AFFECT MY ACTIVITY
RISE FROM A CHAIR: ACTIVITY IS NOT DIFFICULT
KNEEL ON THE FRONT OF YOUR KNEE: ACTIVITY IS MINIMALLY DIFFICULT
SIT WITH YOUR KNEE BENT: ACTIVITY IS NOT DIFFICULT
GIVING WAY, BUCKLING OR SHIFTING OF KNEE: I HAVE THE SYMPTOM BUT IT DOES NOT AFFECT MY ACTIVITY
SQUAT: ACTIVITY IS MINIMALLY DIFFICULT
AS_A_RESULT_OF_YOUR_KNEE_INJURY,_HOW_WOULD_YOU_RATE_YOUR_CURRENT_LEVEL_OF_DAILY_ACTIVITY?: NEARLY NORMAL
WALK: ACTIVITY IS NOT DIFFICULT
RAW_SCORE: 58
HOW_WOULD_YOU_RATE_THE_CURRENT_FUNCTION_OF_YOUR_KNEE_DURING_YOUR_USUAL_DAILY_ACTIVITIES_ON_A_SCALE_FROM_0_TO_100_WITH_100_BEING_YOUR_LEVEL_OF_KNEE_FUNCTION_PRIOR_TO_YOUR_INJURY_AND_0_BEING_THE_INABILITY_TO_PERFORM_ANY_OF_YOUR_USUAL_DAILY_ACTIVITIES?: 80
KNEE_ACTIVITY_OF_DAILY_LIVING_SUM: 58
HOW_WOULD_YOU_RATE_THE_OVERALL_FUNCTION_OF_YOUR_KNEE_DURING_YOUR_USUAL_DAILY_ACTIVITIES?: NEARLY NORMAL
SWELLING: I HAVE THE SYMPTOM BUT IT DOES NOT AFFECT MY ACTIVITY
GO UP STAIRS: ACTIVITY IS MINIMALLY DIFFICULT
STAND: ACTIVITY IS MINIMALLY DIFFICULT

## 2022-08-15 ENCOUNTER — OFFICE VISIT (OUTPATIENT)
Dept: ORTHOPEDICS | Facility: CLINIC | Age: 57
End: 2022-08-15
Payer: COMMERCIAL

## 2022-08-15 VITALS — WEIGHT: 200 LBS | HEIGHT: 73 IN | BODY MASS INDEX: 26.51 KG/M2

## 2022-08-15 DIAGNOSIS — M25.562 CHRONIC PAIN OF LEFT KNEE: Primary | ICD-10-CM

## 2022-08-15 DIAGNOSIS — G89.29 CHRONIC PAIN OF LEFT KNEE: Primary | ICD-10-CM

## 2022-08-15 PROCEDURE — 99024 POSTOP FOLLOW-UP VISIT: CPT | Performed by: ORTHOPAEDIC SURGERY

## 2022-08-15 NOTE — LETTER
8/15/2022         RE: Scottie Eng  [address]  [facility] [address]        Dear Colleague,    Thank you for referring your patient, Scottie Eng, to the [facility] [place]. Please see a copy of my visit note below.    DIAGNOSIS:   1. Medial meniscus tear left knee  2. Status post ACL reconstruction with intact ACL graft    PROCEDURES:  1. Arthroscopic partial meniscectomy; date of surgery 8/5/2022    HISTORY:  Doing well 1 week out from surgery.  Pain control.  Off opioids.  Started therapy    EXAM:     General: Awake, Alert, and oriented. Articulates and communicates with a normal affect     Left lower Extremity:    Incisions well healed without evidence of infection    Normal post-operative effusion and ecchymosis    Range of motion and stability exam not performed    Neurovascularly intact    IMAGING:  No new imaging    ASSESSMENT:  1. 1 week following arthroscopic meniscectomy.  Doing well.    PLAN:     Weightbearing as tolerated    Range of motion as tolerated    Sutures removed in clinic    Leave steri-strips in place until they fall off    OK to shower allowing water to run over incision    No soaking, scrubbing, baths, or lake for 1 additional week    Continue PT as scheduled     Pain medications reviewed and no refills required.     Operative report provided and arthroscopic images reviewed    Follow up as needed           Again, thank you for allowing me to participate in the care of your patient.        Sincerely,        Benny Carrasquillo MD

## 2022-08-15 NOTE — PROGRESS NOTES
DIAGNOSIS:   1. Medial meniscus tear left knee  2. Status post ACL reconstruction with intact ACL graft    PROCEDURES:  1. Arthroscopic partial meniscectomy; date of surgery 8/5/2022    HISTORY:  Doing well 1 week out from surgery.  Pain control.  Off opioids.  Started therapy    EXAM:     General: Awake, Alert, and oriented. Articulates and communicates with a normal affect     Left lower Extremity:    Incisions well healed without evidence of infection    Normal post-operative effusion and ecchymosis    Range of motion and stability exam not performed    Neurovascularly intact    IMAGING:  No new imaging    ASSESSMENT:  1. 1 week following arthroscopic meniscectomy.  Doing well.    PLAN:     Weightbearing as tolerated    Range of motion as tolerated    Sutures removed in clinic    Leave steri-strips in place until they fall off    OK to shower allowing water to run over incision    No soaking, scrubbing, baths, or lake for 1 additional week    Continue PT as scheduled     Pain medications reviewed and no refills required.     Operative report provided and arthroscopic images reviewed    Follow up as needed

## 2022-08-16 ENCOUNTER — THERAPY VISIT (OUTPATIENT)
Dept: PHYSICAL THERAPY | Facility: CLINIC | Age: 57
End: 2022-08-16
Payer: COMMERCIAL

## 2022-08-16 DIAGNOSIS — Z47.89 AFTERCARE FOLLOWING SURGERY OF THE MUSCULOSKELETAL SYSTEM: ICD-10-CM

## 2022-08-16 DIAGNOSIS — G89.29 CHRONIC PAIN OF LEFT KNEE: Primary | ICD-10-CM

## 2022-08-16 DIAGNOSIS — M25.562 CHRONIC PAIN OF LEFT KNEE: Primary | ICD-10-CM

## 2022-08-16 PROCEDURE — 97110 THERAPEUTIC EXERCISES: CPT | Mod: GP

## 2022-08-16 PROCEDURE — 97140 MANUAL THERAPY 1/> REGIONS: CPT | Mod: GP

## 2022-08-30 ENCOUNTER — TRANSFERRED RECORDS (OUTPATIENT)
Dept: HEALTH INFORMATION MANAGEMENT | Facility: CLINIC | Age: 57
End: 2022-08-30

## 2022-08-30 ENCOUNTER — THERAPY VISIT (OUTPATIENT)
Dept: PHYSICAL THERAPY | Facility: CLINIC | Age: 57
End: 2022-08-30
Payer: COMMERCIAL

## 2022-08-30 DIAGNOSIS — M25.562 CHRONIC PAIN OF LEFT KNEE: Primary | ICD-10-CM

## 2022-08-30 DIAGNOSIS — G89.29 CHRONIC PAIN OF LEFT KNEE: Primary | ICD-10-CM

## 2022-08-30 DIAGNOSIS — Z47.89 AFTERCARE FOLLOWING SURGERY OF THE MUSCULOSKELETAL SYSTEM: ICD-10-CM

## 2022-08-30 PROCEDURE — 97110 THERAPEUTIC EXERCISES: CPT | Mod: GP

## 2022-08-30 PROCEDURE — 97112 NEUROMUSCULAR REEDUCATION: CPT | Mod: GP

## 2022-09-06 ENCOUNTER — THERAPY VISIT (OUTPATIENT)
Dept: PHYSICAL THERAPY | Facility: CLINIC | Age: 57
End: 2022-09-06
Payer: COMMERCIAL

## 2022-09-06 DIAGNOSIS — Z47.89 AFTERCARE FOLLOWING SURGERY OF THE MUSCULOSKELETAL SYSTEM: ICD-10-CM

## 2022-09-06 DIAGNOSIS — M25.562 CHRONIC PAIN OF LEFT KNEE: Primary | ICD-10-CM

## 2022-09-06 DIAGNOSIS — G89.29 CHRONIC PAIN OF LEFT KNEE: Primary | ICD-10-CM

## 2022-09-06 PROCEDURE — 97112 NEUROMUSCULAR REEDUCATION: CPT | Mod: GP

## 2022-09-06 PROCEDURE — 97110 THERAPEUTIC EXERCISES: CPT | Mod: GP

## 2022-09-13 ENCOUNTER — THERAPY VISIT (OUTPATIENT)
Dept: PHYSICAL THERAPY | Facility: CLINIC | Age: 57
End: 2022-09-13
Payer: COMMERCIAL

## 2022-09-13 DIAGNOSIS — Z47.89 AFTERCARE FOLLOWING SURGERY OF THE MUSCULOSKELETAL SYSTEM: ICD-10-CM

## 2022-09-13 DIAGNOSIS — G89.29 CHRONIC PAIN OF LEFT KNEE: Primary | ICD-10-CM

## 2022-09-13 DIAGNOSIS — M25.562 CHRONIC PAIN OF LEFT KNEE: Primary | ICD-10-CM

## 2022-09-13 PROCEDURE — 97110 THERAPEUTIC EXERCISES: CPT | Mod: GP

## 2022-09-13 PROCEDURE — 97112 NEUROMUSCULAR REEDUCATION: CPT | Mod: GP

## 2022-09-27 ENCOUNTER — TELEPHONE (OUTPATIENT)
Dept: ORTHOPEDICS | Facility: CLINIC | Age: 57
End: 2022-09-27

## 2022-09-27 ENCOUNTER — ANCILLARY PROCEDURE (OUTPATIENT)
Dept: ULTRASOUND IMAGING | Facility: CLINIC | Age: 57
End: 2022-09-27
Attending: ORTHOPAEDIC SURGERY
Payer: COMMERCIAL

## 2022-09-27 DIAGNOSIS — M25.562 ACUTE PAIN OF LEFT KNEE: ICD-10-CM

## 2022-09-27 DIAGNOSIS — M25.562 ACUTE PAIN OF LEFT KNEE: Primary | ICD-10-CM

## 2022-09-27 PROCEDURE — 93971 EXTREMITY STUDY: CPT | Mod: LT | Performed by: RADIOLOGY

## 2022-09-27 NOTE — TELEPHONE ENCOUNTER
US from lower leg eval noted positive for blood clot.   Notified Dr. Carrasquillo, we will start patient on a starter course of Xarelto. This was sent to the pharmacy.  Called patient and notified him of positive US. We discussed risks to monitor for and management of DVT. He will consult with his PCP for further management of DVT. He will also reach back out to us if he does not hear from his PCP on further management and we will consult with hematology. Let him know the prescription is for 30 days and to contact his pcp this week to discuss next steps.   He expressed understanding of the plan.  Matilda Ovalle RN

## 2022-10-05 ENCOUNTER — MYC MEDICAL ADVICE (OUTPATIENT)
Dept: FAMILY MEDICINE | Facility: CLINIC | Age: 57
End: 2022-10-05

## 2022-10-06 NOTE — TELEPHONE ENCOUNTER
"Dr. Healy-Please review 9/27/22 TE from Orthopedics and advise if virtual or in-person visit recommended for follow up?  Would next week be appropriate time frame for follow up?    \"US from lower leg eval noted positive for blood clot.   Notified Dr. Carrasquillo, we will start patient on a starter course of Xarelto. This was sent to the pharmacy.  Called patient and notified him of positive US. We discussed risks to monitor for and management of DVT. He will consult with his PCP for further management of DVT. He will also reach back out to us if he does not hear from his PCP on further management and we will consult with hematology. Let him know the prescription is for 30 days and to contact his pcp this week to discuss next steps.   He expressed understanding of the plan.  Matilda Ovalle, SUSAN\"      Thank you!  ALETA WittN, RN-BC  [facility]    "

## 2022-10-06 NOTE — TELEPHONE ENCOUNTER
Writer responded via [employer].    ALETA WittN, RN-BC  [facility]

## 2022-10-06 NOTE — TELEPHONE ENCOUNTER
Writer responded via [employer].      ALETA WittN, RN-BC  [facility]

## 2022-10-10 ENCOUNTER — OFFICE VISIT (OUTPATIENT)
Dept: FAMILY MEDICINE | Facility: CLINIC | Age: 57
End: 2022-10-10
Payer: COMMERCIAL

## 2022-10-10 VITALS
DIASTOLIC BLOOD PRESSURE: 88 MMHG | WEIGHT: 200 LBS | SYSTOLIC BLOOD PRESSURE: 138 MMHG | OXYGEN SATURATION: 98 % | BODY MASS INDEX: 26.39 KG/M2 | HEART RATE: 63 BPM | TEMPERATURE: 98.2 F | RESPIRATION RATE: 16 BRPM

## 2022-10-10 DIAGNOSIS — I82.4Z2 ACUTE DEEP VEIN THROMBOSIS (DVT) OF DISTAL VEIN OF LEFT LOWER EXTREMITY (H): Primary | ICD-10-CM

## 2022-10-10 DIAGNOSIS — Z23 NEED FOR VACCINATION: ICD-10-CM

## 2022-10-10 LAB
FACTOR 2 INTERPRETATION: ABNORMAL
FACTOR V INTERPRETATION: ABNORMAL
LAB DIRECTOR COMMENTS: ABNORMAL
LAB DIRECTOR DISCLAIMER: ABNORMAL
LAB DIRECTOR INTERPRETATION: ABNORMAL
LAB DIRECTOR METHODOLOGY: ABNORMAL
LAB DIRECTOR RESULTS: ABNORMAL
SPECIMEN DESCRIPTION: ABNORMAL

## 2022-10-10 PROCEDURE — 36415 COLL VENOUS BLD VENIPUNCTURE: CPT | Performed by: FAMILY MEDICINE

## 2022-10-10 PROCEDURE — 81241 F5 GENE: CPT | Performed by: FAMILY MEDICINE

## 2022-10-10 PROCEDURE — 90682 RIV4 VACC RECOMBINANT DNA IM: CPT | Performed by: FAMILY MEDICINE

## 2022-10-10 PROCEDURE — 81240 F2 GENE: CPT | Performed by: FAMILY MEDICINE

## 2022-10-10 PROCEDURE — G0452 MOLECULAR PATHOLOGY INTERPR: HCPCS | Mod: 26 | Performed by: PATHOLOGY

## 2022-10-10 PROCEDURE — 90471 IMMUNIZATION ADMIN: CPT | Performed by: FAMILY MEDICINE

## 2022-10-10 PROCEDURE — 99215 OFFICE O/P EST HI 40 MIN: CPT | Mod: 25 | Performed by: FAMILY MEDICINE

## 2022-10-10 ASSESSMENT — PAIN SCALES - GENERAL: PAINLEVEL: NO PAIN (0)

## 2022-10-10 NOTE — PATIENT INSTRUCTIONS
Medical Supply Stores:  [facility] at [address] [phone]   [facility] at [address], [phone].  [facility], [facility] [place], [address] [facility], [address], [place], [place] [phone]  Formerly [facility] at [address] [place], [phone] (Monay-Friday 8-5:30 and Saturday 9-2)     If you have MyChart:  1) I kindly request that you check your MyChart prior to all appointments with me and complete any assigned questionnaires ahead of time.    2) You may receive auto-released results from our system before I have the opportunity to review and comment.  Be assured I will review and comment on all of your results as soon as I can.      FYI:  1) I do virtual (video) visits exclusively on Wednesdays.  I still do in-person visits at [facility] ([phone]) on Mondays, Tuesdays and Thursdays.  You can schedule a video visit for many conditions.  Please follow this link:  [email or web address]  2) My schedule has been booking out very far in advance (2 months).  I apologize for the lack of timely access.  If you need to be seen for a chronic condition or preventive (wellness) visit, please be sure to schedule that appointment 2-3 months in advance.  If you have a concern that you feel cannot wait until my next available appointment (such as a hospital follow-up or new symptom of concern) please ask to speak to one of the [facility] nurses who may be able to access a sooner appointment.    I do ask that all patients who are taking chronic medications for conditions that I am managing schedule an in-person visit with me at least once a year.     To schedule any ordered imaging studies (including mammogram and/or DEXA scan) you can call [facility] at [phone].

## 2022-10-10 NOTE — PROGRESS NOTES
Assessment & Plan     Acute deep vein thrombosis (DVT) of distal vein of left lower extremity (H)  First DVT with questionable provoking factor.  He had minor arthroscopic knee surgery and developed leg swelling over a month later - after being very active (including running a 5K race).  I'd like consult from bleeding and clotting clinic at the [facility] for recommendations regarding possible long-term anticoagulation.  For now we'll plan on 3 months of anticoagulation with plan for repeat ultrasound at the end of the 3-month period.  I did order Factor II and Factor V gene mutation testing today. I recommended he wear a compression stocking on the left leg to prevent further venous damage and hopefully mitigate long-term swelling.    - Compression Sleeve/Stocking Order for DME - ONLY FOR DME  - Adult Oncology/Hematology  Referral  - rivaroxaban ANTICOAGULANT (XARELTO) 20 MG TABS tablet  Dispense: 30 tablet; Refill: 1  - US Lower Extremity Venous Duplex Left  - Factor 2 and 5 mutation analysis    Need for vaccination  - INFLUENZA QUAD, RECOMBINANT, P-FREE (RIV4) (FLUBLOK) AGE 50-64 [[id number]]      I spent a total of 40 minutes on the day of the visit.   Time spent doing chart review, history and exam, documentation and further activities per the note      See Patient Instructions    No follow-ups on file.    Melissa Healy MD  [facility]        Kae Huffman is a 57 year old, presenting for the following health issues:  Follow Up      History of Present Illness       Vascular Disease:  He presents for follow up of vascular disease.  He never takes nitroglycerin. He is not taking daily aspirin.He consumes 1 sweetened beverage(s) daily.He exercises with enough effort to increase his heart rate 10 to 19 minutes per day.  He exercises with enough effort to increase his heart rate 7 days per week.   He is taking medications regularly.     He developed DVT following recent left  knee meniscal surgery.  Has never had a DVT previously.    He was laid up for just a couple days after his arthroscopic surgery on 8/5/2022.  After a couple days the knee felt good and he was active - doing his Physical Therapy and camping and even ran a 5K.  Then leg suddenly swelled up about 5 weeks after surgery.  He contacted his surgeon's office and a duplex ultrasound showed below-calf DVT involving multiple veins.  He was started on Xarelto 9/27/2022 and is still on the 15 mg BID dose of the starting pack.  Noticed shortness of breath one day which was not persistent.  No other chest symptoms.  Leg swelling is much better but still swollen.    FHX: no history of DVT's.  Mom had 3 miscarriages.    Review of Systems         Objective    /88   Pulse 63   Temp 98.2  F (36.8  C) (Temporal)   Resp 16   Wt 90.7 kg (200 lb)   SpO2 98%   BMI 26.39 kg/m    Body mass index is 26.39 kg/m .  Physical Exam   GENERAL: healthy, alert and no distress  EYES: Eyes grossly normal to inspection, conjunctivae and sclerae normal  RESP: lungs clear to auscultation - no rales, rhonchi or wheezes  CV: regular rate and rhythm, normal S1 S2, no S3 or S4, no murmur, click or rub  EXTREM: Left lower leg is markedly swollen  SKIN: no suspicious lesions or rashes  NEURO: Normal strength and tone, mentation intact and speech normal              US 9/27/2022:  IMPRESSION:   1. Left leg deep venous thrombosis:       A. Popliteal vein occlusive deep venous thrombosis.       B. Gastrocnemius vein occlusive deep venous thrombosis.       C. Occlusive deep venous thrombosis in one of the left posterior  tibial veins.       D. Peroneal vein deep venous thrombosis in the proximal and mid  calf.

## 2022-10-13 PROBLEM — D68.52 PROTHROMBIN GENE MUTATION (H): Status: ACTIVE | Noted: 2022-10-13

## 2022-10-13 NOTE — RESULT ENCOUNTER NOTE
David Huffman,  You do have one copy of a prothrombin gene mutation that can increase your risk for clotting.  When there is only one mutation present it is often not clinically significant.  (We all carry two copies of these genes and it's more significant if both copies of the gene carry the mutation.)       I recommend that you discuss this with the hematology (bleeding/clotting) clinic when you see them.  This is just one factor that will need to be considered in determining the long-term plan regarding the management of your current deep vein thrombosis.    Melissa Healy MD

## 2022-11-08 ENCOUNTER — MYC MEDICAL ADVICE (OUTPATIENT)
Dept: FAMILY MEDICINE | Facility: CLINIC | Age: 57
End: 2022-11-08

## 2022-11-09 NOTE — TELEPHONE ENCOUNTER
Dr Healy,    Pt asks:    Can I do calf exercises or any running or exercise(play tennis)?  The swelling is down considerably    (I did remind him to wear the compression stocking)    Jaimie Fish RN, BSN  [facility]

## 2022-11-22 ENCOUNTER — TRANSFERRED RECORDS (OUTPATIENT)
Dept: HEALTH INFORMATION MANAGEMENT | Facility: CLINIC | Age: 57
End: 2022-11-22

## 2022-11-23 ENCOUNTER — TRANSFERRED RECORDS (OUTPATIENT)
Dept: FAMILY MEDICINE | Facility: CLINIC | Age: 57
End: 2022-11-23

## 2022-12-11 ENCOUNTER — HEALTH MAINTENANCE LETTER (OUTPATIENT)
Age: 57
End: 2022-12-11

## 2022-12-13 ENCOUNTER — ANCILLARY PROCEDURE (OUTPATIENT)
Dept: ULTRASOUND IMAGING | Facility: CLINIC | Age: 57
End: 2022-12-13
Attending: FAMILY MEDICINE
Payer: COMMERCIAL

## 2022-12-13 DIAGNOSIS — I82.4Z2 ACUTE DEEP VEIN THROMBOSIS (DVT) OF DISTAL VEIN OF LEFT LOWER EXTREMITY (H): ICD-10-CM

## 2022-12-13 PROCEDURE — 93971 EXTREMITY STUDY: CPT | Mod: LT | Performed by: RADIOLOGY

## 2022-12-13 ASSESSMENT — ENCOUNTER SYMPTOMS
HEMATOCHEZIA: 0
PARESTHESIAS: 0
HEMATURIA: 0
ARTHRALGIAS: 0
SORE THROAT: 0
HEARTBURN: 0
FEVER: 0
JOINT SWELLING: 0
WEAKNESS: 0
EYE PAIN: 0
MYALGIAS: 0
SHORTNESS OF BREATH: 0
FREQUENCY: 0
HEADACHES: 0
CONSTIPATION: 0
NERVOUS/ANXIOUS: 0
DIARRHEA: 0
ABDOMINAL PAIN: 0
DYSURIA: 0
PALPITATIONS: 0
DIZZINESS: 0
COUGH: 0
NAUSEA: 0
CHILLS: 0

## 2022-12-13 NOTE — RESULT ENCOUNTER NOTE
David Huffman,  This is still showing a fair amount of clot in the veins.  Keep taking the Xarelto until you are seen at the bleeding/clotting clinic later this month.      Melissa Healy MD

## 2022-12-18 PROBLEM — I82.4Z2 ACUTE DEEP VEIN THROMBOSIS (DVT) OF DISTAL VEIN OF LEFT LOWER EXTREMITY (H): Status: ACTIVE | Noted: 2022-12-18

## 2022-12-18 PROBLEM — I82.4Z2 ACUTE DEEP VEIN THROMBOSIS (DVT) OF DISTAL VEIN OF LEFT LOWER EXTREMITY (H): Status: ACTIVE | Noted: 2022-09-27

## 2022-12-20 ENCOUNTER — TELEPHONE (OUTPATIENT)
Dept: FAMILY MEDICINE | Facility: CLINIC | Age: 57
End: 2022-12-20

## 2022-12-20 ENCOUNTER — OFFICE VISIT (OUTPATIENT)
Dept: FAMILY MEDICINE | Facility: CLINIC | Age: 57
End: 2022-12-20
Payer: COMMERCIAL

## 2022-12-20 VITALS
OXYGEN SATURATION: 98 % | HEART RATE: 66 BPM | BODY MASS INDEX: 26.82 KG/M2 | RESPIRATION RATE: 15 BRPM | WEIGHT: 198 LBS | DIASTOLIC BLOOD PRESSURE: 92 MMHG | SYSTOLIC BLOOD PRESSURE: 142 MMHG | TEMPERATURE: 97.2 F | HEIGHT: 72 IN

## 2022-12-20 DIAGNOSIS — Z23 NEED FOR VACCINATION: ICD-10-CM

## 2022-12-20 DIAGNOSIS — I10 HYPERTENSION GOAL BP (BLOOD PRESSURE) < 130/80: ICD-10-CM

## 2022-12-20 DIAGNOSIS — Z13.220 LIPID SCREENING: ICD-10-CM

## 2022-12-20 DIAGNOSIS — Z00.00 ROUTINE GENERAL MEDICAL EXAMINATION AT A HEALTH CARE FACILITY: Primary | ICD-10-CM

## 2022-12-20 DIAGNOSIS — I82.4Z2 ACUTE DEEP VEIN THROMBOSIS (DVT) OF DISTAL VEIN OF LEFT LOWER EXTREMITY (H): ICD-10-CM

## 2022-12-20 DIAGNOSIS — G47.33 OSA (OBSTRUCTIVE SLEEP APNEA): ICD-10-CM

## 2022-12-20 LAB
ALBUMIN UR-MCNC: NEGATIVE MG/DL
ANION GAP SERPL CALCULATED.3IONS-SCNC: 12 MMOL/L (ref 7–15)
APPEARANCE UR: CLEAR
BACTERIA #/AREA URNS HPF: ABNORMAL /HPF
BILIRUB UR QL STRIP: NEGATIVE
BUN SERPL-MCNC: 15 MG/DL (ref 6–20)
CALCIUM SERPL-MCNC: 9.2 MG/DL (ref 8.6–10)
CHLORIDE SERPL-SCNC: 101 MMOL/L (ref 98–107)
CHOLEST SERPL-MCNC: 211 MG/DL
COLOR UR AUTO: YELLOW
CREAT SERPL-MCNC: 0.95 MG/DL (ref 0.67–1.17)
DEPRECATED HCO3 PLAS-SCNC: 25 MMOL/L (ref 22–29)
GFR SERPL CREATININE-BSD FRML MDRD: >90 ML/MIN/1.73M2
GLUCOSE SERPL-MCNC: 89 MG/DL (ref 70–99)
GLUCOSE UR STRIP-MCNC: NEGATIVE MG/DL
HDLC SERPL-MCNC: 50 MG/DL
HGB UR QL STRIP: ABNORMAL
KETONES UR STRIP-MCNC: NEGATIVE MG/DL
LDLC SERPL CALC-MCNC: 138 MG/DL
LEUKOCYTE ESTERASE UR QL STRIP: NEGATIVE
NITRATE UR QL: NEGATIVE
NONHDLC SERPL-MCNC: 161 MG/DL
PH UR STRIP: 6 [PH] (ref 5–7)
POTASSIUM SERPL-SCNC: 4.2 MMOL/L (ref 3.4–5.3)
RBC #/AREA URNS AUTO: ABNORMAL /HPF
SODIUM SERPL-SCNC: 138 MMOL/L (ref 136–145)
SP GR UR STRIP: 1.02 (ref 1–1.03)
TRIGL SERPL-MCNC: 116 MG/DL
UROBILINOGEN UR STRIP-ACNC: 0.2 E.U./DL
WBC #/AREA URNS AUTO: ABNORMAL /HPF

## 2022-12-20 PROCEDURE — 80061 LIPID PANEL: CPT | Performed by: FAMILY MEDICINE

## 2022-12-20 PROCEDURE — 91312 COVID-19 VACCINE BIVALENT BOOSTER 12+ (PFIZER): CPT | Performed by: FAMILY MEDICINE

## 2022-12-20 PROCEDURE — 93000 ELECTROCARDIOGRAM COMPLETE: CPT | Performed by: FAMILY MEDICINE

## 2022-12-20 PROCEDURE — 81001 URINALYSIS AUTO W/SCOPE: CPT | Performed by: FAMILY MEDICINE

## 2022-12-20 PROCEDURE — 90746 HEPB VACCINE 3 DOSE ADULT IM: CPT | Performed by: FAMILY MEDICINE

## 2022-12-20 PROCEDURE — 0124A COVID-19 VACCINE BIVALENT BOOSTER 12+ (PFIZER): CPT | Performed by: FAMILY MEDICINE

## 2022-12-20 PROCEDURE — 80048 BASIC METABOLIC PNL TOTAL CA: CPT | Performed by: FAMILY MEDICINE

## 2022-12-20 PROCEDURE — 99214 OFFICE O/P EST MOD 30 MIN: CPT | Mod: 25 | Performed by: FAMILY MEDICINE

## 2022-12-20 PROCEDURE — 90471 IMMUNIZATION ADMIN: CPT | Performed by: FAMILY MEDICINE

## 2022-12-20 PROCEDURE — 99396 PREV VISIT EST AGE 40-64: CPT | Mod: 25 | Performed by: FAMILY MEDICINE

## 2022-12-20 PROCEDURE — 36415 COLL VENOUS BLD VENIPUNCTURE: CPT | Performed by: FAMILY MEDICINE

## 2022-12-20 RX ORDER — AMLODIPINE BESYLATE 2.5 MG/1
2.5 TABLET ORAL DAILY
Qty: 30 TABLET | Refills: 0 | Status: SHIPPED | OUTPATIENT
Start: 2022-12-20 | End: 2023-01-20

## 2022-12-20 ASSESSMENT — ENCOUNTER SYMPTOMS
PALPITATIONS: 0
ABDOMINAL PAIN: 0
CHILLS: 0
DYSURIA: 0
FREQUENCY: 0
HEMATURIA: 0
MYALGIAS: 0
PARESTHESIAS: 0
FEVER: 0
EYE PAIN: 0
HEADACHES: 0
WEAKNESS: 0
SORE THROAT: 0
HEARTBURN: 0
CONSTIPATION: 0
COUGH: 0
DIZZINESS: 0
HEMATOCHEZIA: 0
JOINT SWELLING: 0
NERVOUS/ANXIOUS: 0
ARTHRALGIAS: 0
SHORTNESS OF BREATH: 0
NAUSEA: 0
DIARRHEA: 0

## 2022-12-20 ASSESSMENT — PAIN SCALES - GENERAL: PAINLEVEL: NO PAIN (0)

## 2022-12-20 NOTE — PROGRESS NOTES
SUBJECTIVE:   CC: Nathanael is an 57 year old who presents for preventative health visit.     Patient has been advised of split billing requirements and indicates understanding: Yes     Healthy Habits:     Getting at least 3 servings of Calcium per day:  Yes    Bi-annual eye exam:  Yes    Dental care twice a year:  Yes    Sleep apnea or symptoms of sleep apnea:  Daytime drowsiness and Sleep apnea    Diet:  Regular (no restrictions)    Frequency of exercise:  6-7 days/week    Duration of exercise:  15-30 minutes    Taking medications regularly:  Yes    Medication side effects:  Not applicable    PHQ-2 Total Score: 0    Additional concerns today:  No    STEPHEN - had sleep study/eval about 10 years ago at [facility].  At that time he was prescribed an oral appliance which he did not tolerate.  He has questions about Inspire.     DVT - First DVT with questionable provoking factor (a month after minor arthroscopic knee surgery).  He continues on Xarelto and is nearing 3 months of therapy.  Recent repeat US shows some persistent clot.  He tested heterozygous for Factor V Leiden and has appointment at the bleeding/clotting clinic next week.     Today's PHQ-2 Score:   PHQ-2 (  [employer]) 2022   Q1: Little interest or pleasure in doing things 0   Q2: Feeling down, depressed or hopeless 0   PHQ-2 Score 0   PHQ-2 Total Score (12-17 Years)- Positive if 3 or more points; Administer PHQ-A if positive -   Q1: Little interest or pleasure in doing things Not at all   Q2: Feeling down, depressed or hopeless Not at all   PHQ-2 Score 0           Social History     Tobacco Use     Smoking status: Former     Packs/day: 0.00     Years: 15.00     Pack years: 0.00     Types: Cigarettes     Start date: 1980     Quit date: 1996     Years since quittin.7     Smokeless tobacco: Former     Quit date: 1982     Tobacco comments:     quit 25 yrs ago   Substance Use Topics     Alcohol use: Yes     Alcohol/week: 3.0 standard  drinks     Comment: social     If you drink alcohol do you typically have >3 drinks per day or >7 drinks per week? No    Alcohol Use 12/20/2022   Prescreen: >3 drinks/day or >7 drinks/week? -   Prescreen: >3 drinks/day or >7 drinks/week? No       Last PSA:   Prostate Specific Antigen Screen   Date Value Ref Range Status   07/19/2022 0.69 0.00 - 4.00 ug/L Final       Reviewed orders with patient. Reviewed health maintenance and updated orders accordingly - Yes  BP Readings from Last 3 Encounters:   12/20/22 (!) 142/92   10/10/22 138/88   08/05/22 (!) 139/92    Wt Readings from Last 3 Encounters:   12/20/22 89.8 kg (198 lb)   10/10/22 90.7 kg (200 lb)   08/15/22 90.7 kg (200 lb)               Reviewed and updated as needed this visit by clinical staff   Tobacco  Allergies  Meds  Problems             Reviewed and updated as needed this visit by Provider     Meds  Problems            Past Medical History:   Diagnosis Date     Gant's esophagus     followed by [facility]     GERD (gastroesophageal reflux disease)      Hypertension kellen     STEPHEN (obstructive sleep apnea)     mild by sleep study, used a mandibular advancement device which broke      Past Surgical History:   Procedure Laterality Date     ARTHROSCOPY KNEE WITH MENISCAL REPAIR Left 8/5/2022    Procedure: left knee examination under anesthesia, knee arthroscopy, meniscectomy;  Surgeon: Benny Carrasquillo MD;  Location: [facility] OR     AS REPAIR CRUCIATE LIGAMENT,KNEE Left 2005     COLONOSCOPY  2014, 2017, 2019     SEPTOPLASTY, ENDOSCOPIC SINUS SURGERY, COMBINED  1978     STRIP VEIN       Z ORAL SURGERY PROCEDURE  1981       Review of Systems   Constitutional: Negative for chills and fever.   HENT: Negative for congestion, ear pain, hearing loss and sore throat.    Eyes: Negative for pain and visual disturbance.   Respiratory: Negative for cough and shortness of breath.    Cardiovascular: Negative for chest pain, palpitations and peripheral edema.  "  Gastrointestinal: Negative for abdominal pain, constipation, diarrhea, heartburn, hematochezia and nausea.   Genitourinary: Negative for dysuria, frequency, genital sores, hematuria, impotence, penile discharge and urgency.   Musculoskeletal: Negative for arthralgias, joint swelling and myalgias.   Skin: Negative for rash.   Neurological: Negative for dizziness, weakness, headaches and paresthesias.   Psychiatric/Behavioral: Negative for mood changes. The patient is not nervous/anxious.          OBJECTIVE:   BP (!) 142/92 (BP Location: Left arm, Patient Position: Sitting, Cuff Size: Adult Large)   Pulse 66   Temp 97.2  F (36.2  C) (Temporal)   Resp 15   Ht 1.84 m (6' 0.44\")   Wt 89.8 kg (198 lb)   SpO2 98%   BMI 26.53 kg/m      Physical Exam  GENERAL: healthy, alert and no distress  EYES: Eyes grossly normal to inspection, conjunctivae and sclerae normal  HENT: ear canals and TM's normal  NECK: no adenopathy, no asymmetry, masses, or scars and thyroid normal to palpation  RESP: lungs clear to auscultation - no rales, rhonchi or wheezes  CV: regular rate and rhythm, normal S1 S2, no S3 or S4, no murmur, click or rub, no peripheral edema  ABDOMEN: soft, nontender, no hepatosplenomegaly, no masses  MS: no gross musculoskeletal defects noted, no edema  SKIN: no suspicious lesions or rashes  NEURO: Normal strength and tone, mentation intact and speech normal  PSYCH: mentation appears normal, affect normal/bright     EKG: sinus bradycardia, normal axis, normal intervals, no acute ST/T changes c/w ischemia, no LVH by voltage criteria by my interpretation       ASSESSMENT/PLAN:     Routine general medical examination at a health care facility  Reviewed/updated Health Maintenance     Lipid screening  - Lipid panel reflex to direct LDL Fasting    Acute deep vein thrombosis (DVT) of distal vein of left lower extremity (H)  Further anticoagulation per recommendations of bleeding/clotting clinic.      Hypertension goal " "BP (blood pressure) < 130/80  New diagnosis.  Discussed relevant anatomy, pathophysiology, and etiology of this condition.  Discussed the importance of controlling blood pressure to prevent chronic damage to arteries and subsequent arterial disease including risk of heart attack and stroke. Gave patient instructions regarding recommendations on diet/exercise.  We'll have him start low-dose amlodipine.   - amLODIPine (NORVASC) 2.5 MG tablet  Dispense: 30 tablet; Refill: 0  - EKG 12-lead complete w/read - Clinics  - Basic metabolic panel  (Ca, Cl, CO2, Creat, Gluc, K, Na, BUN)  - UA with Microscopic reflex to Culture - lab collect  - Urine Microscopic    STEPHEN (obstructive sleep apnea)  Mild by history with last PSG many years ago (10 years ago?).  I recommended re-evaluation.  He is aware that uncontrolled STEPHEN can exacerbate HTN but did not have a good experience with mandibular advancement device and doesn't want CPAP.    - Adult Sleep Eval & Management  Referral    Need for vaccination  - HEPATITIS B VACCINE ADULT 3 DOSE IM (ENGERIX-B/RECOMBIVAX HB)  - COVID-19,PF,PFIZER BOOSTER BIVALENT (12+YRS)       COUNSELING:   Reviewed preventive health counseling, as reflected in patient instructions      BMI:   Estimated body mass index is 26.53 kg/m  as calculated from the following:    Height as of this encounter: 1.84 m (6' 0.44\").    Weight as of this encounter: 89.8 kg (198 lb).       He reports that he quit smoking about 26 years ago. His smoking use included cigarettes. He started smoking about 42 years ago. He quit smokeless tobacco use about 40 years ago.    Melissa Healy MD  [facility]  "

## 2022-12-20 NOTE — PATIENT INSTRUCTIONS
"See what your blood pressure is next week at the clotting clinic.  If still > 130/80 schedule a Medical Assistant (MA)-only appointment at [facility] in a couple weeks.      You can use over-the-counter Debrox drops for ear wax.    Hypertension is a disease of the blood vessels.  It is not a reflection of mental tension or stress.      Hypertension means that the blood flowing through your arteries is causing an abnormal amount of pressure or strain on the walls of the blood vessels.  Over time this causes the blood vessels to become stiff and diseased.  During this time there are usually no symptoms.  Symptoms occur later, once that damage has been done.  That is why hypertension is considered a \"silent killer.\"    Over time this constant pressure on the blood vessels leads to vascular disease and poor blood flow to the major organs.  This can lead to heart disease (when the blood vessels to the heart are involved), stroke (when the blood vessels to the brain are involved), kidney failure (when the blood vessels to the kidneys are involved), and leg pain/inability to walk (when the blood vessels to the legs are involved).      Normal blood pressure is defined as < 120/80.  An ideal blood pressure would be 110/70.  The average person's blood pressure increases 5-8 mmHg with each decade of life.      Hypertension is the major risk factor for chronic kidney disease and stroke.  A systolic blood pressure (the upper number) of 150 is associated with an 8-fold increased risk of stroke compared to a systolic blood pressure of 110.      Hypertension can be treated with diet, exercise, and medication.      The DASH diet can help lower blood pressure.  It is a plant-based diet that focuses on fruits, vegetables, whole grains, low-fat dairy products, and very little meat.  It includes one serving of nuts, seeds, or legumes per day.  Sodium intake is limited to 2400 mg per day and replaced by " foods that are high in potassium (bananas, potatoes, melons, avocados, and tomatoes) or high in calcium (low-fat dairy).      Decreasing alcohol consumption also improves blood pressure; drinking should be limited to no more than one serving of alcohol per day.       Exercising 150 minutes per week (30 minutes/day for 5 days/week) and avoiding weight gain also improves blood pressure.  If overweight, a weight loss of 10 pounds can reduce the systolic blood pressure by 5 mmHg.        Preventive Health Recommendations  Male Ages 50 - 64    Yearly exam:             See your health care provider every year in order to  o   Review health changes.   o   Discuss preventive care.    o   Review your medicines if your doctor has prescribed any.   Have a cholesterol test every 5 years, or more frequently if you are at risk for high cholesterol/heart disease.   Have a diabetes test (fasting glucose) every three years. If you are at risk for diabetes, you should have this test more often.   Have a colonoscopy at age 50, or have a yearly FIT test (stool test). These exams will check for colon cancer.    Talk with your health care provider about whether or not a prostate cancer screening test (PSA) is right for you.  You should be tested each year for STDs (sexually transmitted diseases), if you re at risk.     Shots: Get a flu shot each year. Get a tetanus shot every 10 years.     Nutrition:  Eat at least 5 servings of fruits and vegetables daily.   Eat whole-grain bread, whole-wheat pasta and brown rice instead of white grains and rice.   Get adequate Calcium and Vitamin D.     Lifestyle  Exercise for at least 150 minutes a week (30 minutes a day, 5 days a week). This will help you control your weight and prevent disease.   Limit alcohol to one drink per day.   No smoking.   Wear sunscreen to prevent skin cancer.   See your dentist every six months for an exam and cleaning.   See your eye doctor every 1 to 2 years.

## 2022-12-21 NOTE — RESULT ENCOUNTER NOTE
David Huffman,  Your urinalysis, EKG, and basic metabolic panel results (blood salts, blood sugar, and kidney function) are normal. Minor highs/lows were noted which are not clinically significant.     Melissa Healy MD

## 2022-12-21 NOTE — RESULT ENCOUNTER NOTE
Hi Nathanael,  Your lipid panel (cholesterol) results show mildly elevated total cholesterol and LDL (bad) cholesterol levels but excellent HDL (good) cholesterol.  Overall your HDL is stable compared to 2019 but your total cholesterol and LDL cholesterol have increased a bit.      As you may know, an elevated cholesterol is one factor that increases your risk for heart disease and stroke.  You can improve your cholesterol by controlling the amount and type of fat you eat, and by increasing your daily activity level.    Here are some ways to improve your nutrition:      *   Eat less fat (such as butter) and no trans fats (such as margarine and Crisco).  Use polyunsaturated fats (such as olive oil) instead.      *   Buy lean cuts of meat and replace red meat with fish and seafood.      *   Eat more fruits and vegetables.      *   Eat a handful of tree nuts (such as almonds, walnuts, or cashews) every day.      *   Avoid fried or fast foods that are high in fat      *   Replace processed starches with whole grains (such as brown rice or whole grain breads, pastas, cereals) and legumes.    Melissa Healy MD

## 2022-12-26 NOTE — PROGRESS NOTES
[facility]  [address] [place] [address]  Phone: [phone], Fax: [phone]    Outpatient Visit Note:    Patient: Scottie Eng  MRN: [id number]  : 1965  JOHN: Dec 27, 2022    Reason for Consultation:  Scottie Eng is referred for evaluation and treatment of VTE.    Assessment:  Scottie Eng is a 57 year old man with a recent LLE DVT one month after arthroscopic knee surgery.    This event likely falls somewhere between a provoked and unprovoked VTE.  I think that a combination of the recent minor surgery on the left leg, the long car trips to the [facility], the heterozygous prothrombin gene mutation, and his age combined to predispose him to thrombosis.  Given the ambiguity and whether this was provoked or not, and that he has been tolerating anticoagulation well so far, we will continue therapeutic treatment for 6 months total.    He will return for follow-up in 3 months and we will make a decision at that time about how to continue.  The options that I am considering are prophylaxis in high risk situations, or indefinite prophylactic dose anticoagulation.  I am leaning towards the latter, given that he does seem to be at elevated risk, perhaps not approaching the risk associated with a full thrombophilia, but more so than the average postoperative provoked DVT.  Because of this, it will be hard to anticipate all of the situations that would require prophylaxis in him, and it may make more sense to give him some long-term baseline prevention with prophylactic dose anticoagulation, if he is amenable.    To complete the work-up, we will get a CBC today to evaluate for any blood count abnormalities that might point towards another underlying etiology.    Plan:  -Continue rivaroxaban 20 mg for 3 more months  - CBC today  -Follow-up in 3 months    The patient is given our center's contact information and is instructed to call if he  should have any further questions or concerns.    Theodora Anne, nurse clinician, is assigned to provide patient care coordination and education.     Patient understands and agrees with the above plan and recommendation.    Jacob Cogan, MD  Hematology    30 minutes spent on the date of the encounter doing chart review, history and exam, documentation and further activities per the note    ----------------------------------------------------------------------------------------------------------------------  History of Present Illness:  Scottie Eng is a 57 year old man with a history of hypertension, sleep apnea, Gant's esophagus, and a recent DVT presenting for hematology evaluation.    He underwent an arthroscopic partial meniscectomy on his left knee on August 15.  On September 27 he underwent lower extremity ultrasounds which showed a left occlusive thrombus of the popliteal vein, and occlusive thrombus of the gastrocnemius vein, an occlusive thrombus in one of the left posterior tibial veins, and a peroneal vein DVT.  This was his first VTE event of his life reportedly.  He received rivaroxaban, and has completed 3 months of therapy.  Thrombophilia testing has revealed that he has heterozygous for the prothrombin gene mutation.     Went [place] within two weeks after surgery (5 hour drive). Able to resume normal activities. Has completed 3 months of AC. No fevers, night sweats, weight loss, rashes. Had had cscopes. Has anal fissues so some red blood occasionally.    Past Medical History:  Past Medical History:   Diagnosis Date     Gant's esophagus     followed by [facility]     GERD (gastroesophageal reflux disease)      Hypertension kellen     STEPHEN (obstructive sleep apnea)     mild by sleep study, used a mandibular advancement device which broke       Past Surgical History:  Past Surgical History:   Procedure Laterality Date     ARTHROSCOPY KNEE WITH MENISCAL REPAIR Left 8/5/2022     "Procedure: left knee examination under anesthesia, knee arthroscopy, meniscectomy;  Surgeon: Benny Carrasquillo MD;  Location: [facility] OR     AS REPAIR CRUCIATE LIGAMENT,KNEE Left 2005     COLONOSCOPY  2014, 2017, 2019     SEPTOPLASTY, ENDOSCOPIC SINUS SURGERY, COMBINED  1978     STRIP VEIN       ZZC ORAL SURGERY PROCEDURE  1981       Medications:  Current Outpatient Medications   Medication Sig Dispense Refill     amLODIPine (NORVASC) 2.5 MG tablet Take 1 tablet (2.5 mg) by mouth daily 30 tablet 0     hydrocortisone 2.5 % cream Apply topically 2 times daily Do not use for more than 10 days on one area of skin. 30 g 1     omeprazole (PRILOSEC) 20 MG DR capsule        rivaroxaban ANTICOAGULANT (XARELTO) 20 MG TABS tablet Take 1 tablet (20 mg) by mouth daily (with dinner) 30 tablet 1        Allergies:  Allergies   Allergen Reactions     Seasonal Allergies        ROS:  Remainder of a comprehensive 14 point ROS is negative unless noted above.    Social History:  Denies any tobacco use. No significant alcohol use. Denies any illicit drug use.     Family History:  No family history of VTE    Objectives:  BP (!) 175/95 (BP Location: Right arm, Patient Position: Sitting, Cuff Size: Adult Regular)   Pulse 77   Temp 98  F (36.7  C) (Oral)   Resp 18   Ht 1.854 m (6' 1\")   Wt 91.6 kg (202 lb)   SpO2 96%   BMI 26.65 kg/m    Exam:   Constitutional: Appears well, no distress  HEENT: Pupils equal and reactive to light. No scleral icterus or hemorrhage. Nares without evidence of telangiectasia. Mucous membranes moist with no wet purpura. Dentition overall ok with no signs of decay. No pharyngeal exudates. No lymphadenopathy, no thyromeagaly  CV: regular rate and rhythm, no murmurs  Respiratory: clear  GI: abdomen soft, nontender, without guarding or rebound. No hepatomeagaly. No splenomegaly. Mus/Skele: no edema  Skin: no petechiae, no ecchymosis.  Neuro: CN II-XII intact. Normal gait. AOx3  Heme/Lymph: no " "supraclavicular, axillary or umbilical adenopathy.     Labs:  WBC   Date Value Ref Range Status   08/10/2008 7.2 4.0 - 11.0 10e9/L Final     Hemoglobin   Date Value Ref Range Status   07/19/2022 14.6 13.3 - 17.7 g/dL Final   08/10/2008 15.0 13.3 - 17.7 g/dL Final     Hematocrit   Date Value Ref Range Status   08/10/2008 43.9 40.0 - 53.0 % Final     Platelet Count   Date Value Ref Range Status   08/10/2008 267 150 - 450 10e9/L Final         Imaging:  US Lower Extremity Venous Duplex Left  Narrative: Exam: Ultrasound of the deep venous system of left leg dated  12/13/2022 7:48 AM    Clinical information: Acute deep vein thrombosis (DVT) of distal vein  of left lower extremity    Comparison: Ultrasound 9/27/2022    Ordering provider: KARLOS KNOWLES    Technique: Gray-scale evaluation with compression and Doppler  assessment of deep venous system for spontaneous and phasic flow, as  well as the presence of distal augmentation. Color flow images  obtained as needed. Gray-scale images with compression of the great  saphenous vein obtained as needed.    Findings:    Right leg:    CFV: Thrombus: No, Phasic: Yes    Left leg:    CFV: Thrombus: No, Phasic: Yes  Femoral vein, proximal: Thrombus: No, Phasic: Yes  Femoral vein, mid: Thrombus: No, Phasic: Yes  Femoral vein, distal: Thrombus: No, Phasic: Yes  Popliteal vein: Thrombus: Yes, partially compressible. Phasic: Yes  Gastrocnemius vein: Thrombus: Yes, partially compressible.   PTV: Thrombus: Thrombus: No  Peroneal vein: Thrombus: No  Impression: Impression:    1. Overall decreased DVT burden compared to prior.  A. Popliteal vein nonocclusive deep vein thrombosis.  B. Gastrocnemius vein nonocclusive deep vein thrombosis  C. Resolution of deep vein thrombosis within the posterior tibial and  peroneal veins.    Reference: \"Duplex Ultrasound in the Diagnosis of Lower-Extremity Deep  Venous Thrombosis\"- Pratibha Christie MD, S; Amor Gentile MD  (Circulation. " 2014;129:917-921. http://circ.ahajournals.org )    I have personally reviewed the examination and initial interpretation  and I agree with the findings.    SYD COOK         SYSTEM ID:  [id number]

## 2022-12-27 ENCOUNTER — OFFICE VISIT (OUTPATIENT)
Dept: HEMATOLOGY | Facility: CLINIC | Age: 57
End: 2022-12-27
Attending: FAMILY MEDICINE
Payer: COMMERCIAL

## 2022-12-27 VITALS
BODY MASS INDEX: 26.77 KG/M2 | TEMPERATURE: 98 F | WEIGHT: 202 LBS | RESPIRATION RATE: 18 BRPM | SYSTOLIC BLOOD PRESSURE: 175 MMHG | HEART RATE: 77 BPM | DIASTOLIC BLOOD PRESSURE: 95 MMHG | OXYGEN SATURATION: 96 % | HEIGHT: 73 IN

## 2022-12-27 DIAGNOSIS — I82.4Z2 ACUTE DEEP VEIN THROMBOSIS (DVT) OF DISTAL VEIN OF LEFT LOWER EXTREMITY (H): ICD-10-CM

## 2022-12-27 LAB
ERYTHROCYTE [DISTWIDTH] IN BLOOD BY AUTOMATED COUNT: 12.1 % (ref 10–15)
HCT VFR BLD AUTO: 46.2 % (ref 40–53)
HGB BLD-MCNC: 15.9 G/DL (ref 13.3–17.7)
MCH RBC QN AUTO: 30.9 PG (ref 26.5–33)
MCHC RBC AUTO-ENTMCNC: 34.4 G/DL (ref 31.5–36.5)
MCV RBC AUTO: 90 FL (ref 78–100)
PLATELET # BLD AUTO: 276 10E3/UL (ref 150–450)
RBC # BLD AUTO: 5.14 10E6/UL (ref 4.4–5.9)
WBC # BLD AUTO: 6.9 10E3/UL (ref 4–11)

## 2022-12-27 PROCEDURE — G0463 HOSPITAL OUTPT CLINIC VISIT: HCPCS | Performed by: STUDENT IN AN ORGANIZED HEALTH CARE EDUCATION/TRAINING PROGRAM

## 2022-12-27 PROCEDURE — G0463 HOSPITAL OUTPT CLINIC VISIT: HCPCS

## 2022-12-27 PROCEDURE — 99204 OFFICE O/P NEW MOD 45 MIN: CPT | Performed by: STUDENT IN AN ORGANIZED HEALTH CARE EDUCATION/TRAINING PROGRAM

## 2022-12-27 PROCEDURE — 36415 COLL VENOUS BLD VENIPUNCTURE: CPT | Performed by: STUDENT IN AN ORGANIZED HEALTH CARE EDUCATION/TRAINING PROGRAM

## 2022-12-27 PROCEDURE — 85027 COMPLETE CBC AUTOMATED: CPT | Performed by: STUDENT IN AN ORGANIZED HEALTH CARE EDUCATION/TRAINING PROGRAM

## 2022-12-28 ENCOUNTER — MYC MEDICAL ADVICE (OUTPATIENT)
Dept: FAMILY MEDICINE | Facility: CLINIC | Age: 57
End: 2022-12-28

## 2022-12-28 DIAGNOSIS — I10 HYPERTENSION GOAL BP (BLOOD PRESSURE) < 130/80: Primary | ICD-10-CM

## 2022-12-28 RX ORDER — AMLODIPINE BESYLATE AND BENAZEPRIL HYDROCHLORIDE 2.5; 1 MG/1; MG/1
1 CAPSULE ORAL DAILY
Qty: 30 CAPSULE | Refills: 0 | Status: SHIPPED | OUTPATIENT
Start: 2022-12-28 | End: 2023-01-24

## 2022-12-28 NOTE — TELEPHONE ENCOUNTER
"Please add patient to one of my \"APPROVAL REQ\" slots in 3 weeks (preferably AFTER Jan 20 so we can give him his 2nd Hep B shot when he's in clinic)  "

## 2023-01-13 PROBLEM — K21.9 GERD (GASTROESOPHAGEAL REFLUX DISEASE): Status: ACTIVE | Noted: 2019-09-26

## 2023-01-18 DIAGNOSIS — I10 HYPERTENSION GOAL BP (BLOOD PRESSURE) < 130/80: ICD-10-CM

## 2023-01-20 RX ORDER — AMLODIPINE BESYLATE 2.5 MG/1
TABLET ORAL
Qty: 30 TABLET | Refills: 0 | Status: SHIPPED | OUTPATIENT
Start: 2023-01-20 | End: 2023-01-24

## 2023-01-20 NOTE — TELEPHONE ENCOUNTER
Routing refill request to provider for review/approval because:  -- Blood pressure elevated    Calcium Channel Blockers Protocol  Failed      Blood pressure under 140/90 in past 12 months      BP Readings from Last 3 Encounters:   12/27/22 (!) 175/95   12/20/22 (!) 142/92   10/10/22 138/88     Last Written Prescription Date:  12/20/2022  Last Fill Quantity: 30,  # refills: 0   Last office visit: 12/20/2022 with prescribing provider:  Monet   Future Office Visit:   Next 5 appointments (look out 90 days)    Jan 24, 2023 11:00 AM  (Arrive by 10:40 AM)  Provider Visit with Melissa Healy MD  [facility] ([facility] ) [address] [place] [address] [phone]         ALETA NavarreteN RN  [facility]

## 2023-01-22 NOTE — PROGRESS NOTES
"  Assessment & Plan     Hypertension goal BP (blood pressure) < 130/80  Uncontrolled.  I'd like to have him increase the amlodipine-benazepril to 10/40 mg and see him again in 3 weeks.  We'll check BMP today following initiation of ACEi.  - amLODIPine-benazepril (LOTREL) 10-40 MG capsule  Dispense: 90 capsule; Refill: 0  - Basic metabolic panel  (Ca, Cl, CO2, Creat, Gluc, K, Na, BUN)    Need for vaccination  Hep B #2  - HEPATITIS B VACCINE, ADULT, IM    See Patient Instructions    Melissa Healy MD  [facility]            Kae Huffman is a 57 year old, presenting for the following health issues:  Blood Pressure Check and Hypertension      History of Present Illness       Hypertension: He presents for follow up of hypertension.  He does not check blood pressure  regularly outside of the clinic. Outside blood pressures have been over 140/90. He does not follow a low salt diet.       We had him start amlodipine 2.5 mg daily a month ago.  A week later, at his visit with the bleeding and clotting clinic his BP was still markedly elevated so I added amlodipine-benazepril 2.5-10 in addition to the 2.5 mg amlodipine.  He states he has been taking both.  He has had a mild cough which he attributes to a recent URI.    He has not had any change in lower extremity edema.  He does have mild venous insufficiency and wears compression stockings consistently.      Since I saw him last he also had his evaluation at the [facility] given his recent VTE with questionable provoking factors.  They recommended anticoagulation for 6 months with a planned follow-up in 3 months (6 months from event).      Review of Systems   as above       Objective    BP (!) 150/95 (BP Location: Right arm, Patient Position: Sitting, Cuff Size: Adult Regular)   Pulse 60   Temp 97.2  F (36.2  C) (Tympanic)   Resp 16   Ht 1.854 m (6' 1\")   Wt 90.7 kg (200 lb)   SpO2 99%   BMI 26.39 kg/m    Body " mass index is 26.39 kg/m .  Physical Exam   GEN:  no apparent distress  EXTREM: compression stockings on bilaterally with trace pitting edema upon removal

## 2023-01-24 ENCOUNTER — OFFICE VISIT (OUTPATIENT)
Dept: FAMILY MEDICINE | Facility: CLINIC | Age: 58
End: 2023-01-24
Payer: COMMERCIAL

## 2023-01-24 VITALS
SYSTOLIC BLOOD PRESSURE: 150 MMHG | TEMPERATURE: 97.2 F | WEIGHT: 200 LBS | BODY MASS INDEX: 26.51 KG/M2 | DIASTOLIC BLOOD PRESSURE: 95 MMHG | HEIGHT: 73 IN | RESPIRATION RATE: 16 BRPM | HEART RATE: 60 BPM | OXYGEN SATURATION: 99 %

## 2023-01-24 DIAGNOSIS — Z23 NEED FOR VACCINATION: ICD-10-CM

## 2023-01-24 DIAGNOSIS — I10 HYPERTENSION GOAL BP (BLOOD PRESSURE) < 130/80: Primary | ICD-10-CM

## 2023-01-24 LAB
ANION GAP SERPL CALCULATED.3IONS-SCNC: 13 MMOL/L (ref 7–15)
BUN SERPL-MCNC: 12.1 MG/DL (ref 6–20)
CALCIUM SERPL-MCNC: 10.1 MG/DL (ref 8.6–10)
CHLORIDE SERPL-SCNC: 102 MMOL/L (ref 98–107)
CREAT SERPL-MCNC: 0.95 MG/DL (ref 0.67–1.17)
DEPRECATED HCO3 PLAS-SCNC: 25 MMOL/L (ref 22–29)
GFR SERPL CREATININE-BSD FRML MDRD: >90 ML/MIN/1.73M2
GLUCOSE SERPL-MCNC: 97 MG/DL (ref 70–99)
POTASSIUM SERPL-SCNC: 5 MMOL/L (ref 3.4–5.3)
SODIUM SERPL-SCNC: 140 MMOL/L (ref 136–145)

## 2023-01-24 PROCEDURE — 99214 OFFICE O/P EST MOD 30 MIN: CPT | Mod: 25 | Performed by: FAMILY MEDICINE

## 2023-01-24 PROCEDURE — 90471 IMMUNIZATION ADMIN: CPT | Performed by: FAMILY MEDICINE

## 2023-01-24 PROCEDURE — 80048 BASIC METABOLIC PNL TOTAL CA: CPT | Performed by: FAMILY MEDICINE

## 2023-01-24 PROCEDURE — 36415 COLL VENOUS BLD VENIPUNCTURE: CPT | Performed by: FAMILY MEDICINE

## 2023-01-24 PROCEDURE — 90746 HEPB VACCINE 3 DOSE ADULT IM: CPT | Performed by: FAMILY MEDICINE

## 2023-01-24 RX ORDER — AMLODIPINE AND BENAZEPRIL HYDROCHLORIDE 5; 20 MG/1; MG/1
1 CAPSULE ORAL DAILY
Status: CANCELLED | OUTPATIENT
Start: 2023-01-24

## 2023-01-24 RX ORDER — AMLODIPINE AND BENAZEPRIL HYDROCHLORIDE 10; 40 MG/1; MG/1
1 CAPSULE ORAL DAILY
Qty: 90 CAPSULE | Refills: 0 | Status: SHIPPED | OUTPATIENT
Start: 2023-01-24 | End: 2023-02-14

## 2023-01-24 ASSESSMENT — PAIN SCALES - GENERAL: PAINLEVEL: NO PAIN (0)

## 2023-01-24 NOTE — PATIENT INSTRUCTIONS
If you have MyChart:  1) I kindly request that you check your MyChart prior to all appointments with me and complete any assigned questionnaires ahead of time.    2) You may receive auto-released results from our system before I have the opportunity to review and comment.  Be assured I will review and comment on all of your results as soon as I can.      If you do not have MyChart:  1) I encourage you to sign up for Mychart ([email or web address]).  This will allow you to review your results, securely communicate with your care team, and schedule virtual visits as well.  2) Please be aware that result letters from the clinic can take up to 2 weeks but urgent results will be called to you.      FYI:  My schedule has been booking out very far in advance (2 months).  I apologize for the lack of timely access.  If you need to be seen for a chronic condition or preventive (wellness) visit, please be sure to schedule that appointment 2-3 months in advance.  If you have a concern that you feel cannot wait until my next available appointment (such as a hospital follow-up or new symptom of concern) please ask to speak to one of the [facility] nurses who may be able to access a sooner appointment.    I do ask that all patients who are taking chronic medications for conditions that I am managing schedule an in-person visit with me at least once a year.     To schedule any ordered imaging studies (including mammogram and/or DEXA scan) you can call [facility] Scheduling at [phone].

## 2023-01-24 NOTE — NURSING NOTE
Prior to immunization administration, verified patients identity using patient s name and date of birth. Please see Immunization Activity for additional information.     Screening Questionnaire for Adult Immunization    Are you sick today?   No   Do you have allergies to medications, food, a vaccine component or latex?   No   Have you ever had a serious reaction after receiving a vaccination?   No   Do you have a long-term health problem with heart, lung, kidney, or metabolic disease (e.g., diabetes), asthma, a blood disorder, no spleen, complement component deficiency, a cochlear implant, or a spinal fluid leak?  Are you on long-term aspirin therapy?   No   Do you have cancer, leukemia, HIV/AIDS, or any other immune system problem?   No   Do you have a parent, brother, or sister with an immune system problem?   No   In the past 3 months, have you taken medications that affect  your immune system, such as prednisone, other steroids, or anticancer drugs; drugs for the treatment of rheumatoid arthritis, Crohn s disease, or psoriasis; or have you had radiation treatments?   No   Have you had a seizure, or a brain or other nervous system problem?   No   During the past year, have you received a transfusion of blood or blood    products, or been given immune (gamma) globulin or antiviral drug?   No   For women: Are you pregnant or is there a chance you could become       pregnant during the next month?   No   Have you received any vaccinations in the past 4 weeks?   Yes     Immunization questionnaire was positive for at least one answer.        Per orders of Dr. Healy, injection of Hep B vaccine given by Kylah Christensen. Patient instructed to remain in clinic for 15 minutes afterwards, and to report any adverse reaction to me immediately.       Screening performed by Kylah Christensen on 1/24/2023 at 11:51 AM.

## 2023-01-24 NOTE — RESULT ENCOUNTER NOTE
Hi Nathanael,  Your labs (basic metabolic panel) were within acceptable limits.  Minor highs/lows were noted which are not clinically significant.  Most importantly your potassium and kidney function (creatinine and eGFR) are stable with the addition of benazepril.    Melissa Healy MD

## 2023-01-25 ENCOUNTER — TRANSFERRED RECORDS (OUTPATIENT)
Dept: HEALTH INFORMATION MANAGEMENT | Facility: CLINIC | Age: 58
End: 2023-01-25
Payer: COMMERCIAL

## 2023-02-12 NOTE — PROGRESS NOTES
Assessment & Plan     Hypertension goal BP (blood pressure) < 130/80  stable/well controlled.  As we increased the benazepril dose from 10 to 40 mg I would like to recheck BMP today but we'll plan for him to continue this regimen until his next annual visit in December.  - Basic metabolic panel  (Ca, Cl, CO2, Creat, Gluc, K, Na, BUN)  - amLODIPine-benazepril (LOTREL) 10-40 MG capsule  Dispense: 90 capsule; Refill: 3    See Patient Instructions    No follow-ups on file.    Melissa Healy MD  [facility]        Kae Huffman is a 57 year old, presenting for the following health issues:  Hypertension      History of Present Illness       Hypertension: He presents for follow up of hypertension.  He does not check blood pressure  regularly outside of the clinic. Outside blood pressures have been over 140/90. He does not follow a low salt diet.       At our last visit 3 weeks ago I had him increase the amlodipine-benazepril to 10/40 mg daily.     BP Readings from Last 3 Encounters:   02/14/23 125/79   01/24/23 (!) 150/95   12/27/22 (!) 175/95        Review of Systems         Objective    /79 (BP Location: Right arm, Patient Position: Sitting, Cuff Size: Adult Regular)   Pulse 69   Temp 97  F (36.1  C) (Temporal)   Resp 20   SpO2 97%   There is no height or weight on file to calculate BMI.  Physical Exam   GEN:  no apparent distress

## 2023-02-14 ENCOUNTER — OFFICE VISIT (OUTPATIENT)
Dept: FAMILY MEDICINE | Facility: CLINIC | Age: 58
End: 2023-02-14
Payer: COMMERCIAL

## 2023-02-14 VITALS
TEMPERATURE: 97 F | OXYGEN SATURATION: 97 % | SYSTOLIC BLOOD PRESSURE: 125 MMHG | HEART RATE: 69 BPM | DIASTOLIC BLOOD PRESSURE: 79 MMHG | RESPIRATION RATE: 20 BRPM

## 2023-02-14 DIAGNOSIS — I10 HYPERTENSION GOAL BP (BLOOD PRESSURE) < 130/80: ICD-10-CM

## 2023-02-14 LAB
ANION GAP SERPL CALCULATED.3IONS-SCNC: 12 MMOL/L (ref 7–15)
BUN SERPL-MCNC: 15.9 MG/DL (ref 6–20)
CALCIUM SERPL-MCNC: 9.5 MG/DL (ref 8.6–10)
CHLORIDE SERPL-SCNC: 104 MMOL/L (ref 98–107)
CREAT SERPL-MCNC: 0.9 MG/DL (ref 0.67–1.17)
DEPRECATED HCO3 PLAS-SCNC: 24 MMOL/L (ref 22–29)
GFR SERPL CREATININE-BSD FRML MDRD: >90 ML/MIN/1.73M2
GLUCOSE SERPL-MCNC: 85 MG/DL (ref 70–99)
POTASSIUM SERPL-SCNC: 4.6 MMOL/L (ref 3.4–5.3)
SODIUM SERPL-SCNC: 140 MMOL/L (ref 136–145)

## 2023-02-14 PROCEDURE — 99213 OFFICE O/P EST LOW 20 MIN: CPT | Performed by: FAMILY MEDICINE

## 2023-02-14 PROCEDURE — 80048 BASIC METABOLIC PNL TOTAL CA: CPT | Performed by: FAMILY MEDICINE

## 2023-02-14 PROCEDURE — 36415 COLL VENOUS BLD VENIPUNCTURE: CPT | Performed by: FAMILY MEDICINE

## 2023-02-14 RX ORDER — AMLODIPINE AND BENAZEPRIL HYDROCHLORIDE 10; 40 MG/1; MG/1
1 CAPSULE ORAL DAILY
Qty: 90 CAPSULE | Refills: 3 | Status: SHIPPED | OUTPATIENT
Start: 2023-02-14 | End: 2023-12-21

## 2023-02-14 ASSESSMENT — SLEEP AND FATIGUE QUESTIONNAIRES
HOW LIKELY ARE YOU TO NOD OFF OR FALL ASLEEP IN A CAR, WHILE STOPPED FOR A FEW MINUTES IN TRAFFIC: WOULD NEVER DOZE
HOW LIKELY ARE YOU TO NOD OFF OR FALL ASLEEP WHILE WATCHING TV: MODERATE CHANCE OF DOZING
HOW LIKELY ARE YOU TO NOD OFF OR FALL ASLEEP WHILE LYING DOWN TO REST IN THE AFTERNOON WHEN CIRCUMSTANCES PERMIT: HIGH CHANCE OF DOZING
HOW LIKELY ARE YOU TO NOD OFF OR FALL ASLEEP WHILE SITTING AND TALKING TO SOMEONE: SLIGHT CHANCE OF DOZING
HOW LIKELY ARE YOU TO NOD OFF OR FALL ASLEEP WHILE SITTING INACTIVE IN A PUBLIC PLACE: SLIGHT CHANCE OF DOZING
HOW LIKELY ARE YOU TO NOD OFF OR FALL ASLEEP WHEN YOU ARE A PASSENGER IN A CAR FOR AN HOUR WITHOUT A BREAK: SLIGHT CHANCE OF DOZING
HOW LIKELY ARE YOU TO NOD OFF OR FALL ASLEEP WHILE SITTING QUIETLY AFTER LUNCH WITHOUT ALCOHOL: SLIGHT CHANCE OF DOZING
HOW LIKELY ARE YOU TO NOD OFF OR FALL ASLEEP WHILE SITTING AND READING: MODERATE CHANCE OF DOZING

## 2023-02-14 NOTE — PROGRESS NOTES
{PROVIDER CHARTING PREFERENCE:248150}    Kae Huffman is a 57 year old{ACCOMPANIED BY STATEMENT (Optional):511803}, presenting for the following health issues:  No chief complaint on file.      History of Present Illness       Hypertension: He presents for follow up of hypertension.  He does not check blood pressure  regularly outside of the clinic. Outside blood pressures have been over 140/90. He does not follow a low salt diet.         {SUPERLIST (Optional):539437}  {additonal problems for provider to add (Optional):503731}    Review of Systems   {ROS COMP (Optional):276430}      Objective    There were no vitals taken for this visit.  There is no height or weight on file to calculate BMI.  Physical Exam   {Exam List (Optional):815371}    {Diagnostic Test Results (Optional):877544}    {AMBULATORY ATTESTATION (Optional):009013}

## 2023-02-14 NOTE — PATIENT INSTRUCTIONS
"You can schedule your Follow-Up with Dr. Cogan by calling  [phone]    Hypertension is a disease of the blood vessels.  It is not a reflection of mental tension or stress.      Hypertension means that the blood flowing through your arteries is causing an abnormal amount of pressure or strain on the walls of the blood vessels.  Over time this causes the blood vessels to become stiff and diseased.  During this time there are usually no symptoms.  Symptoms occur later, once that damage has been done.  That is why hypertension is considered a \"silent killer.\"    Over time this constant pressure on the blood vessels leads to vascular disease and poor blood flow to the major organs.  This can lead to heart disease (when the blood vessels to the heart are involved), stroke (when the blood vessels to the brain are involved), kidney failure (when the blood vessels to the kidneys are involved), and leg pain/inability to walk (when the blood vessels to the legs are involved).      Normal blood pressure is defined as < 120/80.  An ideal blood pressure would be 110/70.  The average person's blood pressure increases 5-8 mmHg with each decade of life.      Hypertension is the major risk factor for chronic kidney disease and stroke.  A systolic blood pressure (the upper number) of 150 is associated with an 8-fold increased risk of stroke compared to a systolic blood pressure of 110.      Hypertension can be treated with diet, exercise, and medication.      The DASH diet can help lower blood pressure.  It is a plant-based diet that focuses on fruits, vegetables, whole grains, low-fat dairy products, and very little meat.  It includes one serving of nuts, seeds, or legumes per day.  Sodium intake is limited to 2400 mg per day and replaced by foods that are high in potassium (bananas, potatoes, melons, avocados, and tomatoes) or high in calcium (low-fat dairy).      Decreasing alcohol consumption also improves blood pressure; " drinking should be limited to no more than one serving of alcohol per day.       Exercising 150 minutes per week (30 minutes/day for 5 days/week) and avoiding weight gain also improves blood pressure.  If overweight, a weight loss of 10 pounds can reduce the systolic blood pressure by 5 mmHg.

## 2023-02-15 NOTE — RESULT ENCOUNTER NOTE
David Huffman,  Your basic metabolic panel results (blood salts, blood sugar, and kidney function) remain stable on the new dose of you medication.  Keep taking that and I'll see you for your routine preventive visit next December.    Melissa Healy MD

## 2023-02-16 ENCOUNTER — OFFICE VISIT (OUTPATIENT)
Dept: SLEEP MEDICINE | Facility: CLINIC | Age: 58
End: 2023-02-16
Attending: FAMILY MEDICINE
Payer: COMMERCIAL

## 2023-02-16 VITALS
SYSTOLIC BLOOD PRESSURE: 136 MMHG | OXYGEN SATURATION: 99 % | DIASTOLIC BLOOD PRESSURE: 79 MMHG | HEIGHT: 73 IN | RESPIRATION RATE: 16 BRPM | HEART RATE: 65 BPM | BODY MASS INDEX: 26.51 KG/M2 | WEIGHT: 200 LBS

## 2023-02-16 DIAGNOSIS — G47.10 HYPERSOMNIA: Primary | ICD-10-CM

## 2023-02-16 DIAGNOSIS — G47.33 OSA (OBSTRUCTIVE SLEEP APNEA): ICD-10-CM

## 2023-02-16 DIAGNOSIS — G47.61 PERIODIC LIMB MOVEMENT DISORDER: ICD-10-CM

## 2023-02-16 PROCEDURE — 99205 OFFICE O/P NEW HI 60 MIN: CPT | Performed by: INTERNAL MEDICINE

## 2023-02-16 RX ORDER — ZOLPIDEM TARTRATE 10 MG/1
TABLET ORAL
Qty: 1 TABLET | Refills: 0 | Status: SHIPPED | OUTPATIENT
Start: 2023-02-16 | End: 2023-03-09

## 2023-02-16 NOTE — PROGRESS NOTES
Chief complaint: Consultation requested by Melissa Healy MD for assessment of sleepiness, history of sleep apnea    History of Present Illness: 57-year-old gentleman describes unrefreshed sleep, daytime sleepiness for years.  He actually was asked years ago and was told he had obstructive sleep apnea.  He tried an oral appliance for a while.  He did not find particular benefit and the device broke rather quickly.  So he stopped using it.  He has not been told that he snores very much.  No observed apneas.  He tends to snore more on his back.  But he usually sleeps on his side.  He does wake up with dry mouth.  He thinks he is a mouth breather at night.  He does have nasal congestion particularly in the spring and fall.  He tried sleeping with a wedge pillow for a while but his wife did not find it very comfortable so they stopped.  He did not think it made much of a difference.    During the day he can be sleepy but he does not drink caffeine on a daily basis.  He will take naps maybe 3 times a week.  He does not drink alcohol in the evenings routinely.  He is working from home and admits to a lot of decreased focus which he thinks may be related to distractions as well as sleepiness.    He does feel like he moves around a lot at night.  He has hip pain and knee pain currently he is taking acetaminophen with diphenhydramine at bedtime nightly.  He is not sure how much is helping but he think it might be helping a little bit.  He denies the urge to get up and walk around due to restlessness.    He has had rare yelling from sleep, no clear dream enactment behavior.    [place] Sleepiness Scale   Sitting and reading: Moderate chance of dozing   Watching TV: Moderate chance of dozing   Sitting, inactive in a public place (e.g. a theatre or a meeting): Slight chance of dozing   As a passenger in a car for an hour without a break: Slight chance of dozing   Lying down to rest in the afternoon when circumstances permit:  High chance of dozing   Sitting and talking to someone: Slight chance of dozing   Sitting quietly after a lunch without alcohol: Slight chance of dozing   In a car, while stopped for a few minutes in traffic: Would never doze   Total score - Humphrey: 11   (Less than 10 normal)    Insomnia Severity Scale  TG Total Score: 10  Total score categories:  0-7 = No clinically significant insomnia   8-14 = Subthreshold insomnia   15-21 = Clinical insomnia (moderate severity)  22-28 = Clinical insomnia (severe)        Past Medical History:   Diagnosis Date     Gant's esophagus     followed by [facility]     GERD (gastroesophageal reflux disease)      Hypertension kellen     STEPHEN (obstructive sleep apnea)     mild by sleep study, used a mandibular advancement device which broke       Allergies   Allergen Reactions     Seasonal Allergies        Current Outpatient Medications   Medication     amLODIPine-benazepril (LOTREL) 10-40 MG capsule     diphenhydrAMINE-acetaminophen (TYLENOL PM)  MG tablet     hydrocortisone 2.5 % cream     omeprazole (PRILOSEC) 20 MG DR capsule     rivaroxaban ANTICOAGULANT (XARELTO) 20 MG TABS tablet     No current facility-administered medications for this visit.       Social History     Socioeconomic History     Marital status:      Spouse name: Not on file     Number of children: Not on file     Years of education: Not on file     Highest education level: Not on file   Occupational History     Not on file   Tobacco Use     Smoking status: Former     Packs/day: 0.00     Years: 15.00     Pack years: 0.00     Types: Cigarettes     Start date: 1980     Quit date: 1996     Years since quittin.8     Smokeless tobacco: Former     Quit date: 1982     Tobacco comments:     quit 25 yrs ago   Vaping Use     Vaping Use: Never used   Substance and Sexual Activity     Alcohol use: Yes     Alcohol/week: 3.0 standard drinks     Comment: social     Drug use: Not Currently     Sexual  "activity: Yes     Partners: Female     Birth control/protection: I.U.D.   Other Topics Concern     Parent/sibling w/ CABG, MI or angioplasty before 65F 55M? No   Social History Narrative     Not on file     Social Determinants of Health     Financial Resource Strain: Not on file   Food Insecurity: Not on file   Transportation Needs: Not on file   Physical Activity: Not on file   Stress: Not on file   Social Connections: Not on file   Intimate Partner Violence: Not on file   Housing Stability: Not on file       Family History   Problem Relation Age of Onset     Ovarian Cancer Mother      Osteoporosis Mother      Cerebrovascular Disease Mother      Cervical Cancer Mother      Other Cancer Mother         Uterine     Esophageal Cancer Father      Other Cancer Father         Esophageal     Diabetes Type 2  Brother      Diabetes Brother         overweight     Tuberculosis Maternal Grandfather      Myocardial Infarction Paternal Grandmother 80     Alcoholism Paternal Grandfather      Diabetes Type 2  Brother      No Known Problems Sister      No Known Problems Daughter      No Known Problems Son      Heart Disease Paternal Uncle          EXAM:  /79   Pulse 65   Resp 16   Ht 1.854 m (6' 1\")   Wt 90.7 kg (200 lb)   SpO2 99%   BMI 26.39 kg/m    GENERAL: Alert and no distress  EYES: Eyes grossly normal to inspection.  No discharge or erythema, or obvious scleral/conjunctival abnormalities. PERRL  Oropharynx with mildly erythematous posterior pharynx, low drooping soft palate  Neck supple without lymphadenopathy  RESP: No audible wheeze, cough, or visible cyanosis.  No visible retractions or increased work of breathing.  Lungs are clear to auscultation bilaterally  Cardiac tones regular rate and rhythm S1-S2 normal  Extremities perfused no cyanosis clubbing or edema  SKIN: Visible skin clear. No significant rash, abnormal pigmentation or lesions.  NEURO: Cranial nerves grossly intact.  Mentation and speech " appropriate for age.  PSYCH: Mentation appears normal, affect normal, judgement and insight intact, normal speech and appearance well-groomed.       PSG 1/21/2010 [facility]  Weight 196 lbs  AHI 0.5, RDI 6.1, Lowest O2 sat 90%  PLMI 23.9 PLMAI 15.7    TSH   Date Value Ref Range Status   09/11/2018 1.37 0.30 - 5.00 uIU/mL Final       ASSESSMENT:  57-year-old gentleman with history of hypertension, and diagnosis of mild sleep apnea by RDI describes unrefreshed sleep, tossing and turning and daytime sleepiness.  Did not tolerate initial therapy of sleep apnea with oral appliance therapy very well.  Differential diagnosis sleep for current symptoms include sleep apnea but also periodic limb movements of sleep as well as chronic pain.    PLAN:  Recommended repeat evaluation of underlying sleep disordered breathing in the sleep lab.  He also would benefit from assessment of arousals and periodic limb movements again since that was elevated on the previous study and could explain his symptoms.  Recommended in lab polysomnography with a sleep aid.  Consider a trial of anti-inflammatory nasal straight spray such as fluticasone or mometasone to see if that helps optimize nasal breathing and improve sleep quality as well.  In future could consider a trial of something such as gabapentin or even tramadol to management of pain and sleep quality.    65 minutes spent on the date of the encounter doing chart review, history and exam, documentation and further activities per the note    Beverley Henderson M.D.  Pulmonary/Critical Care/Sleep Medicine    [facility]   Fl[address], [address]   [address] [place], [place] [address] [address]   Appointments: [phone]    The above note was dictated using voice recognition software and may include typographical errors. Please contact the author for any clarifications.

## 2023-02-16 NOTE — LETTER
2/16/2023         RE: Scottie Eng  [address]  [facility] [address]        Dear Colleague,    Thank you for referring your patient, Scottie Eng, to the [facility] [place]. Please see a copy of my visit note below.    Chief complaint: Consultation requested by Melissa Healy MD for assessment of sleepiness, history of sleep apnea    History of Present Illness: 57-year-old gentleman describes unrefreshed sleep, daytime sleepiness for years.  He actually was asked years ago and was told he had obstructive sleep apnea.  He tried an oral appliance for a while.  He did not find particular benefit and the device broke rather quickly.  So he stopped using it.  He has not been told that he snores very much.  No observed apneas.  He tends to snore more on his back.  But he usually sleeps on his side.  He does wake up with dry mouth.  He thinks he is a mouth breather at night.  He does have nasal congestion particularly in the spring and fall.  He tried sleeping with a wedge pillow for a while but his wife did not find it very comfortable so they stopped.  He did not think it made much of a difference.    During the day he can be sleepy but he does not drink caffeine on a daily basis.  He will take naps maybe 3 times a week.  He does not drink alcohol in the evenings routinely.  He is working from home and admits to a lot of decreased focus which he thinks may be related to distractions as well as sleepiness.    He does feel like he moves around a lot at night.  He has hip pain and knee pain currently he is taking acetaminophen with diphenhydramine at bedtime nightly.  He is not sure how much is helping but he think it might be helping a little bit.  He denies the urge to get up and walk around due to restlessness.    He has had rare yelling from sleep, no clear dream enactment behavior.    [place] Sleepiness Scale   Sitting and reading: Moderate chance of dozing    Watching TV: Moderate chance of dozing   Sitting, inactive in a public place (e.g. a theatre or a meeting): Slight chance of dozing   As a passenger in a car for an hour without a break: Slight chance of dozing   Lying down to rest in the afternoon when circumstances permit: High chance of dozing   Sitting and talking to someone: Slight chance of dozing   Sitting quietly after a lunch without alcohol: Slight chance of dozing   In a car, while stopped for a few minutes in traffic: Would never doze   Total score - [place]: 11   (Less than 10 normal)    Insomnia Severity Scale  TG Total Score: 10  Total score categories:  0-7 = No clinically significant insomnia   8-14 = Subthreshold insomnia   15-21 = Clinical insomnia (moderate severity)  22-28 = Clinical insomnia (severe)        Past Medical History:   Diagnosis Date     Gant's esophagus     followed by [facility]     GERD (gastroesophageal reflux disease)      Hypertension kellen     STEPHEN (obstructive sleep apnea)     mild by sleep study, used a mandibular advancement device which broke       Allergies   Allergen Reactions     Seasonal Allergies        Current Outpatient Medications   Medication     amLODIPine-benazepril (LOTREL) 10-40 MG capsule     diphenhydrAMINE-acetaminophen (TYLENOL PM)  MG tablet     hydrocortisone 2.5 % cream     omeprazole (PRILOSEC) 20 MG DR capsule     rivaroxaban ANTICOAGULANT (XARELTO) 20 MG TABS tablet     No current facility-administered medications for this visit.       Social History     Socioeconomic History     Marital status:      Spouse name: Not on file     Number of children: Not on file     Years of education: Not on file     Highest education level: Not on file   Occupational History     Not on file   Tobacco Use     Smoking status: Former     Packs/day: 0.00     Years: 15.00     Pack years: 0.00     Types: Cigarettes     Start date: 1980     Quit date: 1996     Years since quittin.8     Smokeless  "tobacco: Former     Quit date: 4/11/1982     Tobacco comments:     quit 25 yrs ago   Vaping Use     Vaping Use: Never used   Substance and Sexual Activity     Alcohol use: Yes     Alcohol/week: 3.0 standard drinks     Comment: social     Drug use: Not Currently     Sexual activity: Yes     Partners: Female     Birth control/protection: I.U.D.   Other Topics Concern     Parent/sibling w/ CABG, MI or angioplasty before 65F 55M? No   Social History Narrative     Not on file     Social Determinants of Health     Financial Resource Strain: Not on file   Food Insecurity: Not on file   Transportation Needs: Not on file   Physical Activity: Not on file   Stress: Not on file   Social Connections: Not on file   Intimate Partner Violence: Not on file   Housing Stability: Not on file       Family History   Problem Relation Age of Onset     Ovarian Cancer Mother      Osteoporosis Mother      Cerebrovascular Disease Mother      Cervical Cancer Mother      Other Cancer Mother         Uterine     Esophageal Cancer Father      Other Cancer Father         Esophageal     Diabetes Type 2  Brother      Diabetes Brother         overweight     Tuberculosis Maternal Grandfather      Myocardial Infarction Paternal Grandmother 80     Alcoholism Paternal Grandfather      Diabetes Type 2  Brother      No Known Problems Sister      No Known Problems Daughter      No Known Problems Son      Heart Disease Paternal Uncle          EXAM:  /79   Pulse 65   Resp 16   Ht 1.854 m (6' 1\")   Wt 90.7 kg (200 lb)   SpO2 99%   BMI 26.39 kg/m    GENERAL: Alert and no distress  EYES: Eyes grossly normal to inspection.  No discharge or erythema, or obvious scleral/conjunctival abnormalities. PERRL  Oropharynx with mildly erythematous posterior pharynx, low drooping soft palate  Neck supple without lymphadenopathy  RESP: No audible wheeze, cough, or visible cyanosis.  No visible retractions or increased work of breathing.  Lungs are clear to " auscultation bilaterally  Cardiac tones regular rate and rhythm S1-S2 normal  Extremities perfused no cyanosis clubbing or edema  SKIN: Visible skin clear. No significant rash, abnormal pigmentation or lesions.  NEURO: Cranial nerves grossly intact.  Mentation and speech appropriate for age.  PSYCH: Mentation appears normal, affect normal, judgement and insight intact, normal speech and appearance well-groomed.       PSG 1/21/2010 [facility]  Weight 196 lbs  AHI 0.5, RDI 6.1, Lowest O2 sat 90%  PLMI 23.9 PLMAI 15.7    TSH   Date Value Ref Range Status   09/11/2018 1.37 0.30 - 5.00 uIU/mL Final       ASSESSMENT:  57-year-old gentleman with history of hypertension, and diagnosis of mild sleep apnea by RDI describes unrefreshed sleep, tossing and turning and daytime sleepiness.  Did not tolerate initial therapy of sleep apnea with oral appliance therapy very well.  Differential diagnosis sleep for current symptoms include sleep apnea but also periodic limb movements of sleep as well as chronic pain.    PLAN:  Recommended repeat evaluation of underlying sleep disordered breathing in the sleep lab.  He also would benefit from assessment of arousals and periodic limb movements again since that was elevated on the previous study and could explain his symptoms.  Recommended in lab polysomnography with a sleep aid.  Consider a trial of anti-inflammatory nasal straight spray such as fluticasone or mometasone to see if that helps optimize nasal breathing and improve sleep quality as well.  In future could consider a trial of something such as gabapentin or even tramadol to management of pain and sleep quality.    65 minutes spent on the date of the encounter doing chart review, history and exam, documentation and further activities per the note    Beverley Henderson M.D.  Pulmonary/Critical Care/Sleep Medicine    [facility]   [address], [address]    [address] [place] [address]   Appointments: [phone]    The above note was dictated using voice recognition software and may include typographical errors. Please contact the author for any clarifications.                Again, thank you for allowing me to participate in the care of your patient.        Sincerely,        Beverley Henderson MD

## 2023-02-16 NOTE — PATIENT INSTRUCTIONS
"      MY TREATMENT INFORMATION FOR SLEEP APNEA-  Scottie Eng    DOCTOR : Beverley Henderson MD    Am I having a sleep study at a sleep center?      Frequently asked questions:  1. What is Obstructive Sleep Apnea (STEPHEN)? STEPHEN is the most common type of sleep apnea. Apnea means, \"without breath.\"  Apnea is most often caused by narrowing or collapse of the upper airway as muscles relax during sleep.   Almost everyone has occasional apneas. Most people with sleep apnea have had brief interruptions at night frequently for many years.  The severity of sleep apnea is related to how frequent and severe the events are.   2. What are the consequences of STEPHEN? Symptoms include: feeling sleepy during the day, snoring loudly, gasping or stopping of breathing, trouble sleeping, and occasionally morning headaches or heartburn at night.  Sleepiness can be serious and even increase the risk of falling asleep while driving. Other health consequences may include development of high blood pressure and other cardiovascular disease in persons who are susceptible. Untreated STEPHEN  can contribute to heart disease, stroke and diabetes.   3. What are the treatment options? In most situations, sleep apnea is a lifelong disease that must be managed with daily therapy. Medications are not effective for sleep apnea and surgery is generally not considered until other therapies have been tried. Your treatment is your choice . Continuous Positive Airway (CPAP) works right away and is the therapy that is effective in nearly everyone. An oral device to hold your jaw forward is usually the next most reliable option. Other options include postioning devices (to keep you off your back), weight loss, and surgery including a tongue pacing device. There is more detail about some of these options below.  4. Are my sleep studies covered by insurance? Although we will request verification of coverage, we advise you also check in advance of the study " to ensure there is coverage.    Important tips for those choosing CPAP and similar devices   Know your equipment:  CPAP is continuous positive airway pressure that prevents obstructive sleep apnea by keeping the throat from collapsing while you are sleeping. In most cases, the device is  smart  and can slowly self-adjusts if your throat collapses and keeps a record every day of how well you are treated-this information is available to you and your care team.  BPAP is bilevel positive airway pressure that keeps your throat open and also assists each breath with a pressure boost to maintain adequate breathing.  Special kinds of BPAP are used in patients who have inadequate breathing from lung or heart disease. In most cases, the device is  smart  and can slowly self-adjusts to assist breathing. Like CPAP, the device keeps a record of how well you are treated.  Your mask is your connection to the device. You get to choose what feels most comfortable and the staff will help to make sure if fits. Here: are some examples of the different masks that are available:       Key points to remember on your journey with sleep apnea:  Sleep study.  PAP devices often need to be adjusted during a sleep study to show that they are effective and adjusted right.  Good tips to remember: Try wearing just the mask during a quiet time during the day so your body adapts to wearing it. A humidifier is recommended for comfort in most cases to prevent drying of your nose and throat. Allergy medication from your provider may help you if you are having nasal congestion.  Getting settled-in. It takes more than one night for most of us to get used to wearing a mask. Try wearing just the mask during a quiet time during the day so your body adapts to wearing it. A humidifier is recommended for comfort in most cases. Our team will work with you carefully on the first day and will be in contact within 4 days and again at 2 and 4 weeks for advice and  remote device adjustments. Your therapy is evaluated by the device each day.   Use it every night. The more you are able to sleep naturally for 7-8 hours, the more likely you will have good sleep and to prevent health risks or symptoms from sleep apnea. Even if you use it 4 hours it helps. Occasionally all of us are unable to use a medical therapy, in sleep apnea, it is not dangerous to miss one night.   Communicate. Call our skilled team on the number provided on the first day if your visit for problems that make it difficult to wear the device. Over 2 out of 3 patients can learn to wear the device long-term with help from our team. Remember to call our team or your sleep providers if you are unable to wear the device as we may have other solutions for those who cannot adapt to mask CPAP therapy. It is recommended that you sleep your sleep provider within the first 3 months and yearly after that if you are not having problems.   Use it for your health. We encourage use of CPAP masks during daytime quiet periods to allow your face and brain to adapt to the sensation of CPAP so that it will be a more natural sensation to awaken to at night or during naps. This can be very useful during the first few weeks or months of adapting to CPAP though it does not help medically to wear CPAP during wakefulness and  should not be used as a strategy just to meet guidelines.  Take care of your equipment. Make sure you clean your mask and tubing using directions every day and that your filter and mask are replaced as recommended or if they are not working.     BESIDES CPAP, WHAT OTHER THERAPIES ARE THERE?    Positioning Device  Positioning devices are generally used when sleep apnea is mild and only occurs on your back.This example shows a pillow that straps around the waist. It may be appropriate for those whose sleep study shows milder sleep apnea that occurs primarily when lying flat on one's back. Preliminary studies have shown  benefit but effectiveness at home may need to be verified by a home sleep test. These devices are generally not covered by medical insurance.  Examples of devices that maintain sleeping on the back to prevent snoring and mild sleep apnea.    Belt type body positioner  [email or web address]    Electronic reminder  [email or web address]            Oral Appliance  What is oral appliance therapy?  An oral appliance device fits on your teeth at night like a retainer used after having braces. The device is made by a specialized dentist and requires several visits over 1-2 months before a manufactured device is made to fit your teeth and is adjusted to prevent your sleep apnea. Once an oral device is working properly, snoring should be improved. A home sleep test may be recommended at that time if to determine whether the sleep apnea is adequately treated.       Some things to remember:  -Oral devices are often, but not always, covered by your medical insurance. Be sure to check with your insurance provider.   -If you are referred for oral therapy, you will be given a list of specialized dentists to consider or you may choose to visit the Web site of the American Academy of Dental Sleep Medicine  -Oral devices are less likely to work if you have severe sleep apnea or are extremely overweight.     More detailed information  An oral appliance is a small acrylic device that fits over the upper and lower teeth  (similar to a retainer or a mouth guard). This device slightly moves jaw forward, which moves the base of the tongue forward, opens the airway, improves breathing for effective treat snoring and obstructive sleep apnea in perhaps 7 out of 10 people .  The best working devices are custom-made by a dental device  after a mold is made of the teeth 1, 2, 3.  When is an oral appliance indicated?  Oral appliance therapy is recommended as a first-line treatment for patients with primary snoring, mild sleep  apnea, and for patients with moderate sleep apnea who prefer appliance therapy to use of CPAP4, 5. Severity of sleep apnea is determined by sleep testing and is based on the number of respiratory events per hour of sleep.   How successful is oral appliance therapy?  The success rate of oral appliance therapy in patients with mild sleep apnea is 75-80% while in patients with moderate sleep apnea it is 50-70%. The chance of success in patients with severe sleep apnea is 40-50%. The research also shows that oral appliances have a beneficial effect on the cardiovascular health of STEPHEN patients at the same magnitude as CPAP therapy7.  Oral appliances should be a second-line treatment in cases of severe sleep apnea, but if not completely successful then a combination therapy utilizing CPAP plus oral appliance therapy may be effective. Oral appliances tend to be effective in a broad range of patients although studies show that the patients who have the highest success are females, younger patients, those with milder disease, and less severe obesity. 3, 6.   Finding a dentist that practices dental sleep medicine  Specific training is available through the American Academy of Dental Sleep Medicine for dentists interested in working in the field of sleep. To find a dentist who is educated in the field of sleep and the use of oral appliances, near you, visit the Web site of the American Academy of Dental Sleep Medicine.    References  1. Brian et al. Objectively measured vs self-reported compliance during oral appliance therapy for sleep-disordered breathing. Chest 2013; 144(5): 0175-2138.  2. Yvon, et al. Objective measurement of compliance during oral appliance therapy for sleep-disordered breathing. Thorax 2013; 68(1): 91-96.  3. Josseline et al. Mandibular advancement devices in 620 men and women with STEPHEN and snoring: tolerability and predictors of treatment success. Chest 2004; 125: 6575-1211.  4. Marco A et  al. Oral appliances for snoring and STEPHEN: a review. Sleep 2006; 29: 244-262.  5. Alisha et al. Oral appliance treatment for STEPHEN: an update. J Clin Sleep Med 2014; 10(2): 215-227.  6. Tony et al. Predictors of OSAH treatment outcome. J Dent Res 2007; 86: 5638-0983.      Weight Loss:    Weight loss is a long-term strategy that may improve sleep apnea in some patients.    Weight management is a personal decision and the decision should be based on your interest and the potential benefits.  If you are interested in exploring weight loss strategies, the following discussion covers the impact on weight loss on sleep apnea and the approaches that may be successful.    Being overweight does not necessarily mean you will have health consequences.  Those who have BMI over 35 or over 27 with existing medical conditions carries greater risk.   Weight loss decreases severity of sleep apnea in most people with obesity. For those with mild obesity who have developed snoring with weight gain, even 15-30 pound weight loss can improve and occasionally eliminate sleep apnea.  Structured and life-long dietary and health habits are necessary to lose weight and keep healthier weight levels.     Though there may be significant health benefits from weight loss, long-term weight loss is very difficult to achieve- studies show success with dietary management in less than 10% of people. In addition, substantial weight loss may require years of dietary control and may be difficult if patients have severe obesity. In these cases, surgical management may be considered.  Finally, older individuals who have tolerated obesity without health complications may be less likely to benefit from weight loss strategies.        Surgery:    Surgery for obstructive sleep apnea is considered generally only when other therapies fail to work. Surgery may be discussed with you if you are having a difficult time tolerating CPAP and or when there is an  abnormal structure that requires surgical correction.  Nose and throat surgeries often enlarge the airway to prevent collapse.  Most of these surgeries create pain for 1-2 weeks and up to half of the most common surgeries are not effective throughout life.  You should carefully discuss the benefits and drawbacks to surgery with your sleep provider and surgeon to determine if it is the best solution for you.   More information  Surgery for STEPHEN is directed at areas that are responsible for narrowing or complete obstruction of the airway during sleep.  There are a wide range of procedures available to enlarge and/or stabilize the airway to prevent blockage of breathing in the three major areas where it can occur: the palate, tongue, and nasal regions.  Successful surgical treatment depends on the accurate identification of the factors responsible for obstructive sleep apnea in each person.  A personalized approach is required because there is no single treatment that works well for everyone.  Because of anatomic variation, consultation with an examination by a sleep surgeon is a critical first step in determining what surgical options are best for each patient.  In some cases, examination during sedation may be recommended in order to guide the selection of procedures.  Patients will be counseled about risks and benefits as well as the typical recovery course after surgery. Surgery is typically not a cure for a person s STEPHEN.  However, surgery will often significantly improve one s STEPHEN severity (termed  success rate ).  Even in the absence of a cure, surgery will decrease the cardiovascular risk associated with OSA7; improve overall quality of life8 (sleepiness, functionality, sleep quality, etc).      Palate Procedures:  Patients with STEPHEN often have narrowing of their airway in the region of their tonsils and uvula.  The goals of palate procedures are to widen the airway in this region as well as to help the tissues  resist collapse.  Modern palate procedure techniques focus on tissue conservation and soft tissue rearrangement, rather than tissue removal.  Often the uvula is preserved in this procedure. Residual sleep apnea is common in patient after pharyngoplasty with an average reduction in sleep apnea events of 33%2.      Tongue Procedures:  ExamWhile patients are awake, the muscles that surround the throat are active and keep this region open for breathing. These muscles relax during sleep, allowing the tongue and other structures to collapse and block breathing.  There are several different tongue procedures available.  Selection of a tongue base procedure depends on characteristics seen on physical exam.  Generally, procedures are aimed at removing bulky tissues in this area or preventing the back of the tongue from falling back during sleep.  Success rates for tongue surgery range from 50-62%3.    Hypoglossal Nerve Stimulation:  Hypoglossal nerve stimulation has recently received approval from the [place] Food and Drug Administration for the treatment of obstructive sleep apnea.  This is based on research showing that the system was safe and effective in treating sleep apnea6.  Results showed that the median AHI score decreased 68%, from 29.3 to 9.0. This therapy uses an implant system that senses breathing patterns and delivers mild stimulation to airway muscles, which keeps the airway open during sleep.  The system consists of three fully implanted components: a small generator (similar in size to a pacemaker), a breathing sensor, and a stimulation lead.  Using a small handheld remote, a patient turns the therapy on before bed and off upon awakening.    Candidates for this device must be greater than 18 years of age, have moderate to severe STEPHEN (AHI between 15-65), BMI less than 35, have tried CPAP/oral appliance for at least 8 weeks without success, and have appropriate upper airway anatomy (determined by a  sleep endoscopy performed by Dr. Ayden Amaya).    Hypoglossal Nerve Stimulation Pathway:    The sleep surgeon s office will work with the patient through the insurance prior-authorization process (including communications and appeals).    Nasal Procedures:  Nasal obstruction can interfere with nasal breathing during the day and night.  Studies have shown that relief of nasal obstruction can improve the ability of some patients to tolerate positive airway pressure therapy for obstructive sleep apnea1.  Treatment options include medications such as nasal saline, topical corticosteroid and antihistamine sprays, and oral medications such as antihistamines or decongestants. Non-surgical treatments can include external nasal dilators for selected patients. If these are not successful by themselves, surgery can improve the nasal airway either alone or in combination with these other options.      Combination Procedures:  Combination of surgical procedures and other treatments may be recommended, particularly if patients have more than one area of narrowing or persistent positional disease.  The success rate of combination surgery ranges from 66-80%2,3.    References  Yadi CHEEK. The Role of the Nose in Snoring and Obstructive Sleep Apnoea: An Update.  Eur Arch Otorhinolaryngol. 2011; 268: 1365-73.   Evie SM; Eze JA; Geovani JR; Pallanch JF; Elvis MB; Raffy SG; Elena MATTHEWS. Surgical modifications of the upper airway for obstructive sleep apnea in adults: a systematic review and meta-analysis. SLEEP 2010;33(10):0390-9165. Andres HOANG. Hypopharyngeal surgery in obstructive sleep apnea: an evidence-based medicine review.  Arch Otolaryngol Head Neck Surg. 2006 Feb;132(2):206-13.  Ramirez YH1, Beto Y, Lenin RIKY. The efficacy of anatomically based multilevel surgery for obstructive sleep apnea. Otolaryngol Head Neck Surg. 2003 Oct;129(4):327-35.  Andres HOANG, Goldberg A. Hypopharyngeal Surgery in Obstructive Sleep Apnea: An  Evidence-Based Medicine Review. Arch Otolaryngol Head Neck Surg. 2006 Feb;132(2):206-13.  Rachel PJ et al. Upper-Airway Stimulation for Obstructive Sleep Apnea.  N Engl J Med. 2014 Jan 9;370(2):139-49.  Teresa Y et al. Increased Incidence of Cardiovascular Disease in Middle-aged Men with Obstructive Sleep Apnea. Am J Respir Crit Care Med; 2002 166: 159-165  De Los Santos EM et al. Studying Life Effects and Effectiveness of Palatopharyngoplasty (SLEEP) study: Subjective Outcomes of Isolated Uvulopalatopharyngoplasty. Otolaryngol Head Neck Surg. 2011; 144: 623-631.        WHAT IF I ONLY HAVE SNORING?    Mandibular advancement devices, lateral sleep positioning, long-term weight loss and treatment of nasal allergies have been shown to improve snoring.  Exercising tongue muscles with a game (https://[employer]/Privepass/chip/soundly-reduce-snoring/vx0506847500) or stimulating the tongue during the day with a device (https://doi.org/10.3390/qgc91952544) have improved snoring in some individuals.    Remember to Drive Safe... Drive Alive     Sleep health profoundly affects your health, mood, and your safety.  Thirty three percent of the population (one in three of us) is not getting enough sleep and many have a sleep disorder. Not getting enough sleep or having an untreated / undertreated sleep condition may make us sleepy without even knowing it. In fact, our driving could be dramatically impaired due to our sleep health. As your provider, here are some things I would like you to know about driving:     Here are some warning signs for impairment and dangerous drowsy driving:              -Having been awake more than 16 hours               -Looking tired               -Eyelid drooping              -Head nodding (it could be too late at this point)              -Driving for more than 30 minutes     Some things you could do to make the driving safer if you are experiencing some drowsiness:              -Stop driving and rest               -Call for transportation              -Make sure your sleep disorder is adequately treated     Some things that have been shown NOT to work when experiencing drowsiness while driving:              -Turning on the radio              -Opening windows              -Eating any  distracting  /  entertaining  foods (e.g., sunflower seeds, candy, or any other)              -Talking on the phone      Your decision may not only impact your life, but also the life of others. Please, remember to drive safe for yourself and all of us.

## 2023-02-28 ASSESSMENT — SLEEP AND FATIGUE QUESTIONNAIRES
HOW LIKELY ARE YOU TO NOD OFF OR FALL ASLEEP WHILE LYING DOWN TO REST IN THE AFTERNOON WHEN CIRCUMSTANCES PERMIT: HIGH CHANCE OF DOZING
HOW LIKELY ARE YOU TO NOD OFF OR FALL ASLEEP WHILE SITTING AND READING: MODERATE CHANCE OF DOZING
HOW LIKELY ARE YOU TO NOD OFF OR FALL ASLEEP WHILE SITTING AND TALKING TO SOMEONE: SLIGHT CHANCE OF DOZING
HOW LIKELY ARE YOU TO NOD OFF OR FALL ASLEEP WHILE SITTING INACTIVE IN A PUBLIC PLACE: SLIGHT CHANCE OF DOZING
HOW LIKELY ARE YOU TO NOD OFF OR FALL ASLEEP IN A CAR, WHILE STOPPED FOR A FEW MINUTES IN TRAFFIC: SLIGHT CHANCE OF DOZING
HOW LIKELY ARE YOU TO NOD OFF OR FALL ASLEEP WHEN YOU ARE A PASSENGER IN A CAR FOR AN HOUR WITHOUT A BREAK: SLIGHT CHANCE OF DOZING
HOW LIKELY ARE YOU TO NOD OFF OR FALL ASLEEP WHILE WATCHING TV: MODERATE CHANCE OF DOZING
HOW LIKELY ARE YOU TO NOD OFF OR FALL ASLEEP WHILE SITTING QUIETLY AFTER LUNCH WITHOUT ALCOHOL: MODERATE CHANCE OF DOZING

## 2023-03-07 ENCOUNTER — THERAPY VISIT (OUTPATIENT)
Dept: SLEEP MEDICINE | Facility: CLINIC | Age: 58
End: 2023-03-07
Payer: COMMERCIAL

## 2023-03-07 DIAGNOSIS — G47.10 HYPERSOMNIA: ICD-10-CM

## 2023-03-07 DIAGNOSIS — G47.33 OSA (OBSTRUCTIVE SLEEP APNEA): ICD-10-CM

## 2023-03-07 DIAGNOSIS — G47.61 PERIODIC LIMB MOVEMENT DISORDER: ICD-10-CM

## 2023-03-07 PROCEDURE — 95810 POLYSOM 6/> YRS 4/> PARAM: CPT | Performed by: INTERNAL MEDICINE

## 2023-03-08 NOTE — PATIENT INSTRUCTIONS
[place] [facility]    1. Your sleep study will be reviewed by a sleep physician within the next few days.     2. Please follow up in the sleep clinic as scheduled, or, make an appointment with your sleep provider to be seen within two weeks to discuss the results of the sleep study.    3. If you have any questions or problems with your treatment plan, please contact your sleep clinic provider at [phone] to further manage your condition.    4. Please review your attached medication list, and, at your follow-up appointment advise your sleep clinic provider about any changes.    5. Go to [email or web address] for more information about your sleep problems.    Isabella Myers, RPSGT  March 8, 2023

## 2023-03-09 LAB — SLPCOMP: NORMAL

## 2023-03-09 NOTE — PROCEDURES
" SLEEP STUDY INTERPRETATION  DIAGNOSTIC POLYSOMNOGRAPHY REPORT      Patient: OZ MADRIGAL  YOB: 1965  Study Date: 3/7/2023  MRN: [id number]  Referring Provider: Melissa Healy MD  Ordering Provider: Beverley Henderson MD    Indications for Polysomnography: The patient is a 57 year old Male who is 6' 1\" and weighs 200.0 lbs. His BMI is 26.5, [place] sleepiness scale 11/24 and neck circumference is 36 cm. Relevant medical history includes hypertension. A diagnostic polysomnogram was performed to evaluate for sleep apnea.    Polysomnogram Data: A full night polysomnogram recorded the standard physiologic parameters including EEG, EOG, EMG, ECG, nasal and oral airflow. Respiratory parameters of chest and abdominal movements were recorded with respiratory inductance plethysmography. Oxygen saturation was recorded by pulse oximetry. Hypopnea scoring rule used: 1B 4%.    Sleep Architecture: Normal sleep efficiency with mildly increased arousal index.   The total recording time of the polysomnogram was 432.5 minutes. The total sleep time was 396.0 minutes. Sleep latency was normal at 13.0 minutes with the use of a sleep aid (zolpidem 10 mg). REM latency was 50.5 minutes. Arousal index was increased at 24.2 arousals per hour. Sleep efficiency was normal at 91.6%. Wake after sleep onset was 23.5 minutes. The patient spent 6.3% of total sleep time in Stage N1, 59.3% in Stage N2, 17.6% in Stage N3, and 16.8% in REM. Time in REM supine was - minutes.    Respiration: Snoring without sleep apnea.    Events ? The polysomnogram revealed a presence of - obstructive, 3 central, and - mixed apneas resulting in an apnea index of 0.5 events per hour. There were - obstructive hypopneas and - central hypopneas resulting in an obstructive hypopnea index of - and central hypopnea index of - events per hour. The combined apnea/hypopnea index was 0.5 events per hour (central apnea/hypopnea index was 0.5 events per hour). The " REM AHI was 0.9 events per hour. The supine AHI was - events per hour. The RERA index was 0.8 events per hour.  The RDI was 1.2 events per hour.    Snoring - was reported as moderate/intermittent.    Respiratory rate and pattern - was notable for normal respiratory rate and pattern.    Sustained Sleep Associated Hypoventilation - Transcutaneous carbon dioxide monitoring was not used, however significant hypoventilation was not suggested by oximetry.    Sleep Associated Hypoxemia - (Greater than 5 minutes O2 sat at or below 88%) was not present. Baseline oxygen saturation was 92.4%. Lowest oxygen saturation was 89.0%. Time spent less than or equal to 88% was 0 minutes. Time spent less than or equal to 89% was 0.2 minutes.    Movement Activity: Frequent periodic limb movements.    Periodic Limb Activity - There were 468 PLMs during the entire study. The PLM index was 70.9 movements per hour. The PLM Arousal Index was 13.8 per hour.    REM EMG Activity - Excessive transient/sustained muscle activity was not present.    Nocturnal Behavior - Abnormal sleep related behaviors were not noted.    Bruxism - None apparent.    Cardiac Summary: Normal sinus rhythm and rates.  The average pulse rate was 59.6 bpm. The minimum pulse rate was 53.0 bpm while the maximum pulse rate was 101.0 bpm.  Arrhythmias were not noted.      Assessment:     Snoring without sleep apnea; overall AHI 0.5 events per hour.    Normal sleep efficiency with mildly increased arousal index.    Frequent periodic limb movements.    Normal sinus rhythm and rates.    Recommendations:    Advice regarding the risks of drowsy driving.    Suggest optimizing sleep schedule and avoiding sleep deprivation.    Pharmacologic therapy should be used for management of restless legs syndrome only if present and clinically indicated and not based on the presence of periodic limb movements alone.    Diagnostic Codes:   Unspecified Sleep Disturbance G47.9  Periodic Limb  Movement Disorder G47.61        _____________________________________   Electronically Signed By: Beverley Henderson MD 3/9/2023

## 2023-03-26 NOTE — PROGRESS NOTES
[facility]  [facility] [place] [address]  Phone: [phone], Fax: [phone]    Outpatient Visit Note:    Patient: Scottie Eng  MRN: [id number]  : 1965  JOHN: Mar 28, 2023    Reason for Consultation:  Scottie Eng is referred for evaluation and treatment of VTE.    Assessment:  Scottie Eng is a 57 year old man with a LLE DVT, s/p 6 months of therapeutic rivaroxaban.    His VTE event likely falls somewhere between a provoked and unprovoked VTE.  I think that a combination of minor surgery on the left leg, long car trips to the [facility], a heterozygous prothrombin gene mutation, and his age combined to predispose him to thrombosis.  Given the ambiguity and whether this was provoked or not, he completed 6 months of therapeutic anticoagulation.    At this point, I suggested transition to prophylactic anticoagulation indefinitely with rivaroxaban 10 mg.  He is in agreement with this.  We will follow-up in 6 months to see how he is tolerating this and how his lower extremity edema is doing.  He has been trying a combination of compression stockings, leg elevation and exercise.  I expect this to continue to improve over time, but will investigate other interventions if it persists.      Plan:  -Continue rivaroxaban, decrease to 10 mg daily  -Follow-up in 6 months    The patient is given our center's contact information and is instructed to call if he should have any further questions or concerns.    Radha Liu, nurse clinician, is assigned to provide patient care coordination and education.     Patient understands and agrees with the above plan and recommendation.    Jacob Cogan, MD  Hematology    30 minutes spent on the date of the encounter doing chart review, history and exam, documentation and further activities per the  note    ----------------------------------------------------------------------------------------------------------------------  History of Present Illness:  Scottie Eng is a 57 year old man with a history of hypertension, sleep apnea, Gant's esophagus, and a recent DVT presenting for hematology evaluation.    He underwent an arthroscopic partial meniscectomy on his left knee on August 15.  On September 27 he underwent lower extremity ultrasounds which showed a left occlusive thrombus of the popliteal vein, and occlusive thrombus of the gastrocnemius vein, an occlusive thrombus in one of the left posterior tibial veins, and a peroneal vein DVT.  This was his first VTE event of his life reportedly.  He received rivaroxaban, and has completed 3 months of therapy.  Thrombophilia testing has revealed that he has heterozygous for the prothrombin gene mutation.     Went to [employer] [facility] [place] within two weeks after surgery (5 hour drive). Able to resume normal activities. Has completed 3 months of AC. No fevers, night sweats, weight loss, rashes. Had had cscopes. Has anal fissues so some red blood occasionally.    March 28, 2023: Completed 3 months of therapeutic rivaroxaban since last visit giving him a total of 6 months of treatment. Notes leg swelling with prolonged sitting or activity. Remains very active.  No issues with bleeding.  Notes high cost of the medication ($80 a month).    Past Medical History:  Past Medical History:   Diagnosis Date     Gant's esophagus     followed by [facility]     GERD (gastroesophageal reflux disease)      Hypertension kellen     Leg DVT (deep venous thromboembolism), acute (H) 09/2022       Past Surgical History:  Past Surgical History:   Procedure Laterality Date     ARTHROSCOPY KNEE WITH MENISCAL REPAIR Left 8/5/2022    Procedure: left knee examination under anesthesia, knee arthroscopy, meniscectomy;  Surgeon: Benny Carrasquillo MD;  Location: [facility] OR      AS REPAIR CRUCIATE LIGAMENT,KNEE Left 2005     COLONOSCOPY  2014, 2017, 2019     SEPTOPLASTY, ENDOSCOPIC SINUS SURGERY, COMBINED  1978     STRIP VEIN       [facility] ORAL SURGERY PROCEDURE  1981       Medications:  Current Outpatient Medications   Medication Sig Dispense Refill     amLODIPine-benazepril (LOTREL) 10-40 MG capsule Take 1 capsule by mouth daily 90 capsule 3     hydrocortisone 2.5 % cream Apply topically 2 times daily Do not use for more than 10 days on one area of skin. 30 g 1     omeprazole (PRILOSEC) 20 MG DR capsule        rivaroxaban ANTICOAGULANT (XARELTO) 20 MG TABS tablet Take 1 tablet (20 mg) by mouth daily (with dinner) 30 tablet 3     diphenhydrAMINE-acetaminophen (TYLENOL PM)  MG tablet Take by mouth every 24 hours          Allergies:  Allergies   Allergen Reactions     Seasonal Allergies        ROS:  Remainder of a comprehensive 14 point ROS is negative unless noted above.    Social History:  Denies any tobacco use. No significant alcohol use. Denies any illicit drug use.     Family History:  No family history of VTE    Objectives:  /83 (BP Location: Right arm, Patient Position: Sitting, Cuff Size: Adult Regular)   Pulse 65   Temp 98.1  F (36.7  C) (Oral)   Wt 90.9 kg (200 lb 8 oz)   SpO2 96%   BMI 26.45 kg/m    Exam:   Constitutional: Appears well, no distress  HEENT: Pupils equal and reactive to light. No scleral icterus or hemorrhage. Nares without evidence of telangiectasia. Mucous membranes moist with no wet purpura. Dentition overall ok with no signs of decay. No pharyngeal exudates. No lymphadenopathy, no thyromeagaly  CV: regular rate and rhythm, no murmurs  Respiratory: clear  GI: abdomen soft, nontender, without guarding or rebound. No hepatomeagaly. No splenomegaly. Mus/Skele: no edema  Skin: no petechiae, no ecchymosis.  Neuro: CN II-XII intact. Normal gait. AOx3  Heme/Lymph: no supraclavicular, axillary or umbilical adenopathy.     Labs:  WBC   Date Value Ref Range  "Status   08/10/2008 7.2 4.0 - 11.0 10e9/L Final     WBC Count   Date Value Ref Range Status   12/27/2022 6.9 4.0 - 11.0 10e3/uL Final     Hemoglobin   Date Value Ref Range Status   12/27/2022 15.9 13.3 - 17.7 g/dL Final   07/19/2022 14.6 13.3 - 17.7 g/dL Final   08/10/2008 15.0 13.3 - 17.7 g/dL Final     Hematocrit   Date Value Ref Range Status   12/27/2022 46.2 40.0 - 53.0 % Final   08/10/2008 43.9 40.0 - 53.0 % Final     Platelet Count   Date Value Ref Range Status   12/27/2022 276 150 - 450 10e3/uL Final   08/10/2008 267 150 - 450 10e9/L Final         Imaging:  US Lower Extremity Venous Duplex Left  Narrative: Exam: Ultrasound of the deep venous system of left leg dated  12/13/2022 7:48 AM    Clinical information: Acute deep vein thrombosis (DVT) of distal vein  of left lower extremity    Comparison: Ultrasound 9/27/2022    Ordering provider: KARLOS KNOWLES    Technique: Gray-scale evaluation with compression and Doppler  assessment of deep venous system for spontaneous and phasic flow, as  well as the presence of distal augmentation. Color flow images  obtained as needed. Gray-scale images with compression of the great  saphenous vein obtained as needed.    Findings:    Right leg:    CFV: Thrombus: No, Phasic: Yes    Left leg:    CFV: Thrombus: No, Phasic: Yes  Femoral vein, proximal: Thrombus: No, Phasic: Yes  Femoral vein, mid: Thrombus: No, Phasic: Yes  Femoral vein, distal: Thrombus: No, Phasic: Yes  Popliteal vein: Thrombus: Yes, partially compressible. Phasic: Yes  Gastrocnemius vein: Thrombus: Yes, partially compressible.   PTV: Thrombus: Thrombus: No  Peroneal vein: Thrombus: No  Impression: Impression:    1. Overall decreased DVT burden compared to prior.  A. Popliteal vein nonocclusive deep vein thrombosis.  B. Gastrocnemius vein nonocclusive deep vein thrombosis  C. Resolution of deep vein thrombosis within the posterior tibial and  peroneal veins.    Reference: \"Duplex Ultrasound in the Diagnosis " "of Lower-Extremity Deep  Venous Thrombosis\"- Pratibha Christie MD, S; Amor Gentile MD  (Circulation. 2014;129:917-921. http://circ.ahajournals.org )    I have personally reviewed the examination and initial interpretation  and I agree with the findings.    SYD COOK         SYSTEM ID:  [id number]      "

## 2023-03-27 ASSESSMENT — SLEEP AND FATIGUE QUESTIONNAIRES
HOW LIKELY ARE YOU TO NOD OFF OR FALL ASLEEP WHILE SITTING AND READING: SLIGHT CHANCE OF DOZING
HOW LIKELY ARE YOU TO NOD OFF OR FALL ASLEEP IN A CAR, WHILE STOPPED FOR A FEW MINUTES IN TRAFFIC: WOULD NEVER DOZE
HOW LIKELY ARE YOU TO NOD OFF OR FALL ASLEEP WHILE SITTING INACTIVE IN A PUBLIC PLACE: SLIGHT CHANCE OF DOZING
HOW LIKELY ARE YOU TO NOD OFF OR FALL ASLEEP WHEN YOU ARE A PASSENGER IN A CAR FOR AN HOUR WITHOUT A BREAK: SLIGHT CHANCE OF DOZING
HOW LIKELY ARE YOU TO NOD OFF OR FALL ASLEEP WHILE WATCHING TV: SLIGHT CHANCE OF DOZING
HOW LIKELY ARE YOU TO NOD OFF OR FALL ASLEEP WHILE SITTING AND TALKING TO SOMEONE: SLIGHT CHANCE OF DOZING
HOW LIKELY ARE YOU TO NOD OFF OR FALL ASLEEP WHILE LYING DOWN TO REST IN THE AFTERNOON WHEN CIRCUMSTANCES PERMIT: MODERATE CHANCE OF DOZING
HOW LIKELY ARE YOU TO NOD OFF OR FALL ASLEEP WHILE SITTING QUIETLY AFTER LUNCH WITHOUT ALCOHOL: SLIGHT CHANCE OF DOZING

## 2023-03-28 ENCOUNTER — OFFICE VISIT (OUTPATIENT)
Dept: HEMATOLOGY | Facility: CLINIC | Age: 58
End: 2023-03-28
Attending: STUDENT IN AN ORGANIZED HEALTH CARE EDUCATION/TRAINING PROGRAM
Payer: COMMERCIAL

## 2023-03-28 VITALS
SYSTOLIC BLOOD PRESSURE: 125 MMHG | TEMPERATURE: 98.1 F | WEIGHT: 200.5 LBS | DIASTOLIC BLOOD PRESSURE: 83 MMHG | OXYGEN SATURATION: 96 % | HEART RATE: 65 BPM | BODY MASS INDEX: 26.45 KG/M2

## 2023-03-28 DIAGNOSIS — I82.4Z2 ACUTE DEEP VEIN THROMBOSIS (DVT) OF DISTAL VEIN OF LEFT LOWER EXTREMITY (H): Primary | ICD-10-CM

## 2023-03-28 PROCEDURE — 99214 OFFICE O/P EST MOD 30 MIN: CPT | Performed by: STUDENT IN AN ORGANIZED HEALTH CARE EDUCATION/TRAINING PROGRAM

## 2023-03-28 PROCEDURE — G0463 HOSPITAL OUTPT CLINIC VISIT: HCPCS | Performed by: STUDENT IN AN ORGANIZED HEALTH CARE EDUCATION/TRAINING PROGRAM

## 2023-03-30 ENCOUNTER — OFFICE VISIT (OUTPATIENT)
Dept: SLEEP MEDICINE | Facility: CLINIC | Age: 58
End: 2023-03-30
Payer: COMMERCIAL

## 2023-03-30 VITALS
WEIGHT: 200.6 LBS | DIASTOLIC BLOOD PRESSURE: 77 MMHG | RESPIRATION RATE: 16 BRPM | OXYGEN SATURATION: 99 % | SYSTOLIC BLOOD PRESSURE: 131 MMHG | HEART RATE: 60 BPM | BODY MASS INDEX: 26.59 KG/M2 | HEIGHT: 73 IN

## 2023-03-30 DIAGNOSIS — G47.61 PERIODIC LIMB MOVEMENT DISORDER: ICD-10-CM

## 2023-03-30 DIAGNOSIS — G25.81 RESTLESS LEGS SYNDROME (RLS): Primary | ICD-10-CM

## 2023-03-30 PROBLEM — G47.10 HYPERSOMNIA: Status: ACTIVE | Noted: 2023-03-30

## 2023-03-30 PROCEDURE — 99215 OFFICE O/P EST HI 40 MIN: CPT | Performed by: INTERNAL MEDICINE

## 2023-03-30 RX ORDER — GABAPENTIN 300 MG/1
300 CAPSULE ORAL AT BEDTIME
Qty: 60 CAPSULE | Refills: 1 | Status: SHIPPED | OUTPATIENT
Start: 2023-03-30 | End: 2023-05-09

## 2023-03-30 NOTE — PROGRESS NOTES
Chief complaint: Follow-up sleep study    History of Present Illness: 57-year-old gentleman with history of excessive daytime sleepiness and unrefreshed sleep for many years.  He was reassessed years ago and was told he had sleep apnea.  He tried an oral appliance for a while but no benefit.  Also the device broke rather quickly.  He recently requested reassessment.  He does not snore very much.  The results of the in-lab polysomnography were reviewed with him in detail today.  They did not show significant sleep apnea nor snoring.  They did show frequent periodic limb movements, 70/h.  Many of which were associated with arousals.  He does affirm occasional symptoms of motor restlessness while he is watching TV in bed.  He also reports that he moves around a lot at night.  He does have some pain occasionally with hip and knee.  He takes naps maybe 3 times a week.  He does occasionally drink alcoholic beverages in the evening socially.  He typically will have a cup of coffee in the mornings.    [place] Sleepiness Scale  Total score - [place]: 8 (3/27/2023 10:11 AM)   (Less than 10 normal)    Insomnia Severity Scale  TG Total Score: 7  (normal 0-7, mild 8-14, moderate 15-21, severe 22-28)    Past Medical History:   Diagnosis Date     Gant's esophagus     followed by [facility]     GERD (gastroesophageal reflux disease)      Hypertension kellen     Leg DVT (deep venous thromboembolism), acute (H) 09/2022       Allergies   Allergen Reactions     Seasonal Allergies        Current Outpatient Medications   Medication     amLODIPine-benazepril (LOTREL) 10-40 MG capsule     gabapentin (NEURONTIN) 300 MG capsule     hydrocortisone 2.5 % cream     omeprazole (PRILOSEC) 20 MG DR capsule     rivaroxaban ANTICOAGULANT (XARELTO) 10 MG TABS tablet     rivaroxaban ANTICOAGULANT (XARELTO) 20 MG TABS tablet     No current facility-administered medications for this visit.       Social History     Socioeconomic History     Marital  "status:      Spouse name: Not on file     Number of children: Not on file     Years of education: Not on file     Highest education level: Not on file   Occupational History     Not on file   Tobacco Use     Smoking status: Former     Packs/day: 0.00     Years: 15.00     Pack years: 0.00     Types: Cigarettes     Start date: 1980     Quit date: 1996     Years since quittin.9     Smokeless tobacco: Former     Quit date: 1982     Tobacco comments:     quit 25 yrs ago   Vaping Use     Vaping Use: Never used   Substance and Sexual Activity     Alcohol use: Yes     Alcohol/week: 3.0 standard drinks     Comment: social     Drug use: Not Currently     Sexual activity: Yes     Partners: Female     Birth control/protection: I.U.D.   Other Topics Concern     Parent/sibling w/ CABG, MI or angioplasty before 65F 55M? No   Social History Narrative     Not on file     Social Determinants of Health     Financial Resource Strain: Not on file   Food Insecurity: Not on file   Transportation Needs: Not on file   Physical Activity: Not on file   Stress: Not on file   Social Connections: Not on file   Intimate Partner Violence: Not on file   Housing Stability: Not on file       Family History   Problem Relation Age of Onset     Ovarian Cancer Mother      Osteoporosis Mother      Cerebrovascular Disease Mother      Cervical Cancer Mother      Other Cancer Mother         Uterine     Esophageal Cancer Father      Other Cancer Father         Esophageal     Diabetes Type 2  Brother      Sleep Apnea Brother      Tuberculosis Maternal Grandfather      Myocardial Infarction Paternal Grandmother 80     Alcoholism Paternal Grandfather      No Known Problems Daughter      No Known Problems Son      Heart Disease Paternal Uncle            EXAM:  /77   Pulse 60   Resp 16   Ht 1.854 m (6' 1\")   Wt 91 kg (200 lb 9.6 oz)   SpO2 99%   BMI 26.47 kg/m    GENERAL: Alert and no distress  EYES: Eyes grossly normal to " inspection.  No discharge or erythema, or obvious scleral/conjunctival abnormalities.  RESP: No audible wheeze, cough, or visible cyanosis.  No visible retractions or increased work of breathing.    SKIN: Visible skin clear. No significant rash, abnormal pigmentation or lesions.  NEURO: Cranial nerves grossly intact.  Mentation and speech appropriate for age.  PSYCH: Mentation appears normal, affect normal, judgement and insight intact, normal speech and appearance well-groomed.       PSG 3/7/2023  Weight 200 lbs BMI 26.5  AHI 0.5, RDI 1.2, Lowest O2 Sat 89%  PLM index 70.9, PLM arousal index 13.8    PSG 1/21/2010 [facility]  Weight 196 lbs  AHI 0.5, RDI 6.1, Lowest O2 sat 90%  PLMI 23.9 PLMAI 15.7      TSH   Date Value Ref Range Status   09/11/2018 1.37 0.30 - 5.00 uIU/mL Final          ASSESSMENT:  57-year-old gentleman with excessive daytime sleepiness, no evidence for significant obstructive sleep apnea by AHI or RDI.  He does have occasional motor restlessness and frequent periodic limb movements at night.  This has been affirmed on 2 studies.  Think it is reasonable to target motor restlessness and periodic limb movements to see if medication therapy would help improve his daytime symptoms.  He is at low risk for low iron stores however this could be a target for therapy.    PLAN:  Recommended assessment of iron stores with ferritin, total iron binding capacity etc.  Patient would like to have this done with primary care.  In the meantime we will start a trial of gabapentin.  We will start 300 mg about an hour before bed.  Should take this for couple of weeks and if he is not experiencing excessive sleepiness in the morning or other prohibitive side effects consider increasing to 600 mg if needed.  He will be getting a MyChart message from me in about 3 weeks to ask him how he is doing.  Should follow-up with me in 3 months.  We will make adjustments to medication dosing as needed over the  following few months.  Patient sometimes need is much as 1200 to 1800 mg for optimal therapy.  Patient was provided literature about side effects of gabapentin.  We also discussed this directly.  He is agreeable with this plan.  We also discussed medications that can aggravate motor restlessness including excessive caffeine and diphenhydramine or other first generation antihistamines.      42 minutes spent by me on the date of the encounter doing chart review, history and exam, documentation and further activities per the note    Beverley Henderson M.D.  Pulmonary/Critical Care/Sleep Medicine    [facility]   Floor [address], [address]   [address] [place], [place] [address]. [place] [address]   Appointments: [phone]    The above note was dictated using voice recognition software and may include typographical errors. Please contact the author for any clarifications.

## 2023-04-24 ENCOUNTER — MYC MEDICAL ADVICE (OUTPATIENT)
Dept: FAMILY MEDICINE | Facility: CLINIC | Age: 58
End: 2023-04-24
Payer: COMMERCIAL

## 2023-04-25 NOTE — TELEPHONE ENCOUNTER
Responded to the patient through MyChart.     Debra Campa RN  [facility]

## 2023-05-08 NOTE — TELEPHONE ENCOUNTER
Pending Prescriptions:                       Disp   Refills    gabapentin (NEURONTIN) 300 MG capsule     60 cap*1            Sig: Take 1 capsule (300 mg) by mouth At Bedtime After           1-2 weeks increase to two capsules at bedtime    Last Written Prescription Date:  3/30/2023  Last Fill Quantity: 60,   # refills: 1  Last Office Visit with G, P or [facility] prescribing provider: 3/30/2023  Future Office visit: No follow up scheduled at this time.    NILSON Luna

## 2023-05-09 RX ORDER — GABAPENTIN 300 MG/1
600 CAPSULE ORAL AT BEDTIME
Qty: 60 CAPSULE | Refills: 1 | Status: SHIPPED | OUTPATIENT
Start: 2023-05-09 | End: 2023-06-06

## 2023-05-12 NOTE — PROGRESS NOTES
Discharge Note    Progress reporting period is from initial evaluation date (please see noted date below) to Sep 13, 2022.  Linked Episodes   Type: Episode: Status: Noted: Resolved: Last update: Updated by:   PHYSICAL THERAPY L knee 8/5/22 Active 8/10/2022  9/13/2022  7:05 AM Yuni Londono, PT      Comments:       Scottie failed to follow up and current status is unknown.  Please see information below for last relevant information on current status.  Patient seen for   visits.    SUBJECTIVE  Subjective changes noted by patient:  Ran a 5K on Saturday now whole body is sore.  .  Current pain level is  .     Previous pain level was   .   Changes in function:  Yes (See Goal flowsheet attached for changes in current functional level)  Adverse reaction to treatment or activity: None    OBJECTIVE  Changes noted in objective findings: Stroke test 1+, Knee flexion  deg     ASSESSMENT/PLAN      Updated problem list and treatment plan:   Pain - HEP  STG/LTGs have been met or progress has been made towards goals:  Yes, please see goal flowsheet for most current information  Assessment of Progress: current status is unknown.    Last current status: Pt is progressing as expected   Self Management Plans:  HEP  I have re-evaluated this patient and find that the nature, scope, duration and intensity of the therapy is appropriate for the medical condition of the patient.  Scottie continues to require the following intervention to meet STG and LTG's:  HEP.    Recommendations:  Discharge with current home program.  Patient to follow up with MD as needed.    Please refer to the daily flowsheet for treatment today, total treatment time and time spent performing 1:1 timed codes.

## 2023-05-28 NOTE — PROGRESS NOTES
[facility]  [facility] [place] [address]  Phone: [phone], Fax: [phone]    Outpatient Visit Note:    Patient: Scottie Eng  MRN: [id number]  : 1965  JOHN: May 30, 2023    Assessment:  Scottie Eng is a 57 year old man with LLE DVT in the context of a heterozygous prothrombin gene mutation, on prophylactic rivaroxaban.    He is doing well on prophylactic rivaroxaban. I think he should continue this indefinitely. We will follow up in 6 months, then perhaps space to annual.    Plan:  -Continue rivaroxaban 10 mg daily  -Follow-up in 6 months    The patient is given our center's contact information and is instructed to call if he should have any further questions or concerns.    Radha Liu, nurse clinician, is assigned to provide patient care coordination and education.     Patient understands and agrees with the above plan and recommendation.    Jacob Cogan, MD  Hematology    30 minutes spent on the date of the encounter doing chart review, history and exam, documentation and further activities per the note     ----------------------------------------------------------------------------------------------------------------------  History of Present Illness:  Scottie Eng is a 57 year old man with a history of hypertension, sleep apnea, Gant's esophagus, and a recent DVT presenting for hematology evaluation.    He underwent an arthroscopic partial meniscectomy on his left knee on August 15.  On  he underwent lower extremity ultrasounds which showed a left occlusive thrombus of the popliteal vein, and occlusive thrombus of the gastrocnemius vein, an occlusive thrombus in one of the left posterior tibial veins, and a peroneal vein DVT.  This was his first VTE event of his life reportedly.  He received rivaroxaban, and has completed 3 months of therapy.  Thrombophilia testing has revealed that he has heterozygous for the  prothrombin gene mutation.     Went to [employer] [facility] within two weeks after surgery (5 hour drive). Able to resume normal activities. Has completed 3 months of AC. No fevers, night sweats, weight loss, rashes. Had had cscopes. Has anal fissues so some red blood occasionally.    March 28, 2023: Completed 3 months of therapeutic rivaroxaban since last visit giving him a total of 6 months of treatment. Notes leg swelling with prolonged sitting or activity. Remains very active.  No issues with bleeding.  Notes high cost of the medication ($80 a month).    May 30, 2023: Being evaluated for excessive daytime sleepiness and possible restless leg syndrome, with a plan for evaluation of iron stores.  Started on trial of gabapentin for this. Notes ongoing LLE edema intermittently, resolves with leg elevation and sleeping well. No bleeding or bruising.    Past Medical History:  Past Medical History:   Diagnosis Date     Gant's esophagus     followed by [facility]     GERD (gastroesophageal reflux disease)      Hypertension kellen     Leg DVT (deep venous thromboembolism), acute (H) 09/2022       Past Surgical History:  Past Surgical History:   Procedure Laterality Date     ARTHROSCOPY KNEE WITH MENISCAL REPAIR Left 8/5/2022    Procedure: left knee examination under anesthesia, knee arthroscopy, meniscectomy;  Surgeon: Benny Carrasquillo MD;  Location: [facility] OR     AS REPAIR CRUCIATE LIGAMENT,KNEE Left 2005     COLONOSCOPY  2014, 2017, 2019     SEPTOPLASTY, ENDOSCOPIC SINUS SURGERY, COMBINED  1978     STRIP VEIN       [facility] ORAL SURGERY PROCEDURE  1981       Medications:  Current Outpatient Medications   Medication Sig Dispense Refill     amLODIPine-benazepril (LOTREL) 10-40 MG capsule Take 1 capsule by mouth daily 90 capsule 3     gabapentin (NEURONTIN) 300 MG capsule Take 2 capsules (600 mg) by mouth At Bedtime 60 capsule 1     hydrocortisone 2.5 % cream Apply topically 2 times daily Do not use for more than  10 days on one area of skin. 30 g 1     omeprazole (PRILOSEC) 20 MG DR capsule        rivaroxaban ANTICOAGULANT (XARELTO) 10 MG TABS tablet Take 1 tablet (10 mg) by mouth daily (with dinner) for 360 days 30 tablet 11        Allergies:  Allergies   Allergen Reactions     Seasonal Allergies        ROS:  Remainder of a comprehensive 14 point ROS is negative unless noted above.    Social History:  Denies any tobacco use. No significant alcohol use. Denies any illicit drug use.     Family History:  No family history of VTE    Objectives:  N/a, video visit    Labs:  WBC   Date Value Ref Range Status   08/10/2008 7.2 4.0 - 11.0 10e9/L Final     WBC Count   Date Value Ref Range Status   12/27/2022 6.9 4.0 - 11.0 10e3/uL Final     Hemoglobin   Date Value Ref Range Status   12/27/2022 15.9 13.3 - 17.7 g/dL Final   07/19/2022 14.6 13.3 - 17.7 g/dL Final   08/10/2008 15.0 13.3 - 17.7 g/dL Final     Hematocrit   Date Value Ref Range Status   12/27/2022 46.2 40.0 - 53.0 % Final   08/10/2008 43.9 40.0 - 53.0 % Final     Platelet Count   Date Value Ref Range Status   12/27/2022 276 150 - 450 10e3/uL Final   08/10/2008 267 150 - 450 10e9/L Final         Imaging:  US Lower Extremity Venous Duplex Left  Narrative: Exam: Ultrasound of the deep venous system of left leg dated  12/13/2022 7:48 AM    Clinical information: Acute deep vein thrombosis (DVT) of distal vein  of left lower extremity    Comparison: Ultrasound 9/27/2022    Ordering provider: KARLOS KNOWLES    Technique: Gray-scale evaluation with compression and Doppler  assessment of deep venous system for spontaneous and phasic flow, as  well as the presence of distal augmentation. Color flow images  obtained as needed. Gray-scale images with compression of the great  saphenous vein obtained as needed.    Findings:    Right leg:    CFV: Thrombus: No, Phasic: Yes    Left leg:    CFV: Thrombus: No, Phasic: Yes  Femoral vein, proximal: Thrombus: No, Phasic: Yes  Femoral vein,  "mid: Thrombus: No, Phasic: Yes  Femoral vein, distal: Thrombus: No, Phasic: Yes  Popliteal vein: Thrombus: Yes, partially compressible. Phasic: Yes  Gastrocnemius vein: Thrombus: Yes, partially compressible.   PTV: Thrombus: Thrombus: No  Peroneal vein: Thrombus: No  Impression: Impression:    1. Overall decreased DVT burden compared to prior.  A. Popliteal vein nonocclusive deep vein thrombosis.  B. Gastrocnemius vein nonocclusive deep vein thrombosis  C. Resolution of deep vein thrombosis within the posterior tibial and  peroneal veins.    Reference: \"Duplex Ultrasound in the Diagnosis of Lower-Extremity Deep  Venous Thrombosis\"- Pratibha Christie MD, S; Amor Gentile MD  (Circulation. 2014;129:917-921. http://circ.ahajournals.org )    I have personally reviewed the examination and initial interpretation  and I agree with the findings.    SYD COOK         SYSTEM ID:  [id number]      "

## 2023-05-30 ENCOUNTER — VIRTUAL VISIT (OUTPATIENT)
Dept: HEMATOLOGY | Facility: CLINIC | Age: 58
End: 2023-05-30
Attending: STUDENT IN AN ORGANIZED HEALTH CARE EDUCATION/TRAINING PROGRAM
Payer: COMMERCIAL

## 2023-05-30 DIAGNOSIS — I82.4Z2 ACUTE DEEP VEIN THROMBOSIS (DVT) OF DISTAL VEIN OF LEFT LOWER EXTREMITY (H): Primary | ICD-10-CM

## 2023-05-30 PROCEDURE — 99214 OFFICE O/P EST MOD 30 MIN: CPT | Performed by: STUDENT IN AN ORGANIZED HEALTH CARE EDUCATION/TRAINING PROGRAM

## 2023-05-30 NOTE — PROGRESS NOTES
Patient was contacted to complete the pre-visit call prior to their telephone visit with the provider.     Allergies and medications were reviewed.     I thanked them for their time to cover this information.     Nannette Youngblood MA

## 2023-06-05 ENCOUNTER — TELEPHONE (OUTPATIENT)
Dept: SLEEP MEDICINE | Facility: CLINIC | Age: 58
End: 2023-06-05
Payer: COMMERCIAL

## 2023-06-05 NOTE — TELEPHONE ENCOUNTER
Pending Prescriptions:                       Disp   Refills    gabapentin (NEURONTIN) 300 MG capsule     60 cap*1            Sig: Take 2 capsules (600 mg) by mouth At Bedtime    Last Written Prescription Date:  5/9/2023  Last Fill Quantity: 60,   # refills: 1  Last Office Visit with FMRONNIE, JEFFREYP or [facility] prescribing provider: 3/30/2023  Future Office visit:   No follow up scheduled at this time.    NILSON Luna

## 2023-06-06 RX ORDER — GABAPENTIN 300 MG/1
600 CAPSULE ORAL AT BEDTIME
Qty: 60 CAPSULE | Refills: 1 | Status: SHIPPED | OUTPATIENT
Start: 2023-06-06 | End: 2023-06-30

## 2023-06-25 ASSESSMENT — SLEEP AND FATIGUE QUESTIONNAIRES
HOW LIKELY ARE YOU TO NOD OFF OR FALL ASLEEP WHILE SITTING AND READING: SLIGHT CHANCE OF DOZING
HOW LIKELY ARE YOU TO NOD OFF OR FALL ASLEEP WHILE SITTING QUIETLY AFTER LUNCH WITHOUT ALCOHOL: SLIGHT CHANCE OF DOZING
HOW LIKELY ARE YOU TO NOD OFF OR FALL ASLEEP WHILE WATCHING TV: SLIGHT CHANCE OF DOZING
HOW LIKELY ARE YOU TO NOD OFF OR FALL ASLEEP IN A CAR, WHILE STOPPED FOR A FEW MINUTES IN TRAFFIC: WOULD NEVER DOZE
HOW LIKELY ARE YOU TO NOD OFF OR FALL ASLEEP WHILE SITTING AND TALKING TO SOMEONE: WOULD NEVER DOZE
HOW LIKELY ARE YOU TO NOD OFF OR FALL ASLEEP WHILE LYING DOWN TO REST IN THE AFTERNOON WHEN CIRCUMSTANCES PERMIT: MODERATE CHANCE OF DOZING
HOW LIKELY ARE YOU TO NOD OFF OR FALL ASLEEP WHILE SITTING INACTIVE IN A PUBLIC PLACE: SLIGHT CHANCE OF DOZING
HOW LIKELY ARE YOU TO NOD OFF OR FALL ASLEEP WHEN YOU ARE A PASSENGER IN A CAR FOR AN HOUR WITHOUT A BREAK: SLIGHT CHANCE OF DOZING

## 2023-06-30 ENCOUNTER — OFFICE VISIT (OUTPATIENT)
Dept: SLEEP MEDICINE | Facility: CLINIC | Age: 58
End: 2023-06-30
Payer: COMMERCIAL

## 2023-06-30 ENCOUNTER — LAB (OUTPATIENT)
Dept: LAB | Facility: CLINIC | Age: 58
End: 2023-06-30
Payer: COMMERCIAL

## 2023-06-30 VITALS
HEIGHT: 73 IN | HEART RATE: 70 BPM | WEIGHT: 196.2 LBS | SYSTOLIC BLOOD PRESSURE: 123 MMHG | BODY MASS INDEX: 26 KG/M2 | DIASTOLIC BLOOD PRESSURE: 72 MMHG

## 2023-06-30 DIAGNOSIS — R53.83 OTHER FATIGUE: ICD-10-CM

## 2023-06-30 DIAGNOSIS — G25.81 RESTLESS LEGS SYNDROME (RLS): ICD-10-CM

## 2023-06-30 DIAGNOSIS — G47.61 PERIODIC LIMB MOVEMENT DISORDER: Primary | ICD-10-CM

## 2023-06-30 DIAGNOSIS — G47.61 PERIODIC LIMB MOVEMENT DISORDER: ICD-10-CM

## 2023-06-30 LAB
FERRITIN SERPL-MCNC: 314 NG/ML (ref 31–409)
IRON BINDING CAPACITY (ROCHE): 246 UG/DL (ref 240–430)
IRON SATN MFR SERPL: 49 % (ref 15–46)
IRON SERPL-MCNC: 121 UG/DL (ref 61–157)

## 2023-06-30 PROCEDURE — 82728 ASSAY OF FERRITIN: CPT

## 2023-06-30 PROCEDURE — 36415 COLL VENOUS BLD VENIPUNCTURE: CPT

## 2023-06-30 PROCEDURE — 99213 OFFICE O/P EST LOW 20 MIN: CPT | Performed by: INTERNAL MEDICINE

## 2023-06-30 PROCEDURE — 83550 IRON BINDING TEST: CPT

## 2023-06-30 RX ORDER — GABAPENTIN 300 MG/1
600 CAPSULE ORAL AT BEDTIME
Qty: 60 CAPSULE | Refills: 11 | Status: SHIPPED | OUTPATIENT
Start: 2023-06-30 | End: 2023-12-21

## 2023-06-30 NOTE — NURSING NOTE
1 year reminder sent to pt via [employer]. Marked to send 1 month before follow up due.    NILSON Luna

## 2023-06-30 NOTE — PROGRESS NOTES
Chief complaint: Follow-up periodic limb movements,  unrefreshing sleep    History of Present Illness: 57-year-old gentleman with history of daytime sleepiness and unrefreshed sleep.  He had been treated for sleep apnea for a while.  Recent reassessment showed no significant sleep apnea.  He does have periodic limb movements of sleep noted on both studies,  70/h on the most recent study.  Many of which were associated with arousals.  We decided to try a trial of gabapentin.  He reports today that 600 mg at bedtime has been helpful.  He reports better sleep quality with less arousals at night.  He also feels a little better during the day.  He is taking naps maybe once a week.  He has been having some knee pain related to an injury during tennis recently.  He denies morning grogginess from the medication or other side effects.  He did not get iron testing completed through primary care yet.    [place] Sleepiness Scale  Total score - [place]: 7 (6/25/2023 11:46 PM)   (Less than 10 normal)    Insomnia Severity Scale  TG Total Score: 7  (normal 0-7, mild 8-14, moderate 15-21, severe 22-28)    Past Medical History:   Diagnosis Date     Gant's esophagus     followed by [facility]     GERD (gastroesophageal reflux disease)      Hypertension kellen     Leg DVT (deep venous thromboembolism), acute (H) 09/2022       Allergies   Allergen Reactions     Seasonal Allergies        Current Outpatient Medications   Medication     amLODIPine-benazepril (LOTREL) 10-40 MG capsule     gabapentin (NEURONTIN) 300 MG capsule     hydrocortisone 2.5 % cream     omeprazole (PRILOSEC) 20 MG DR capsule     rivaroxaban ANTICOAGULANT (XARELTO) 10 MG TABS tablet     No current facility-administered medications for this visit.       Social History     Socioeconomic History     Marital status:      Spouse name: Not on file     Number of children: Not on file     Years of education: Not on file     Highest education level: Not on file  "  Occupational History     Not on file   Tobacco Use     Smoking status: Former     Packs/day: 0.00     Years: 15.00     Pack years: 0.00     Types: Cigarettes     Start date: 1980     Quit date: 1996     Years since quittin.2     Smokeless tobacco: Former     Quit date: 1982     Tobacco comments:     quit 25 yrs ago   Vaping Use     Vaping Use: Never used   Substance and Sexual Activity     Alcohol use: Yes     Alcohol/week: 3.0 standard drinks of alcohol     Comment: social     Drug use: Not Currently     Sexual activity: Yes     Partners: Female     Birth control/protection: I.U.D.   Other Topics Concern     Parent/sibling w/ CABG, MI or angioplasty before 65F 55M? No   Social History Narrative     Not on file     Social Determinants of Health     Financial Resource Strain: Not on file   Food Insecurity: Not on file   Transportation Needs: Not on file   Physical Activity: Not on file   Stress: Not on file   Social Connections: Not on file   Intimate Partner Violence: Not on file   Housing Stability: Not on file       Family History   Problem Relation Age of Onset     Ovarian Cancer Mother      Osteoporosis Mother      Cerebrovascular Disease Mother      Cervical Cancer Mother      Other Cancer Mother         Uterine     Esophageal Cancer Father      Other Cancer Father         Esophageal     Diabetes Type 2  Brother      Sleep Apnea Brother      Tuberculosis Maternal Grandfather      Myocardial Infarction Paternal Grandmother 80     Alcoholism Paternal Grandfather      No Known Problems Daughter      No Known Problems Son      Heart Disease Paternal Uncle            EXAM:  /72   Pulse 70   Ht 1.854 m (6' 0.99\")   Wt 89 kg (196 lb 3.2 oz)   BMI 25.89 kg/m    GENERAL: Alert and no distress  EYES: Eyes grossly normal to inspection.  No discharge or erythema, or obvious scleral/conjunctival abnormalities.  RESP: No audible wheeze, cough, or visible cyanosis.  No visible retractions or " increased work of breathing. \  Extremities with bilateral ankle edema-trace  SKIN: Visible skin clear. No significant rash, abnormal pigmentation or lesions.  NEURO: Cranial nerves grossly intact.  Mentation and speech appropriate for age.  PSYCH: Mentation appears normal, affect normal, judgement and insight intact, normal speech and appearance well-groomed.       PSG 3/7/2023  Weight 200 lbs BMI 26.5  AHI 0.5, RDI 1.2, Lowest O2 Sat 89%  PLM index 70.9, PLM arousal index 13.8     PSG 1/21/2010 [facility]  Weight 196 lbs  AHI 0.5, RDI 6.1, Lowest O2 sat 90%  PLMI 23.9 PLMAI 15.7      TSH   Date Value Ref Range Status   09/11/2018 1.37 0.30 - 5.00 uIU/mL Final         ASSESSMENT:  57-year-old gentleman with periodic limb movements of sleep and occasional motor restlessness under improved control with gabapentin 600 mg at bedtime.  He denies side effects at this time.  He is interested in continuing this medication.  He has had an improvement in daytime symptoms of fatigue.    PLAN:  Patient is can continue 600 mg at at bedtime.  We reviewed factors that can aggravate motor restlessness and periodic limb movements.  Recommended iron storage evaluation and orders placed.  I will contact him via [employer] with results.  We reviewed iron supplementation if indicated.  Patient will follow-up in sleep medicine in 1 year.  He can discuss with primary care whether he like to continue getting medication through primary care however.  He is agreeable with this plan.      25 minutes spent by me on the date of the encounter doing chart review, history and exam, documentation and further activities per the note    Beverley Henderson M.D.  Pulmonary/Critical Care/Sleep Medicine    [facility]   Floor 1, [address]   [address] [place], [place] [address]. [place] [address]   Appointments: [phone]    The above note was dictated using voice recognition software and  may include typographical errors. Please contact the author for any clarifications.

## 2023-07-01 ENCOUNTER — MYC MEDICAL ADVICE (OUTPATIENT)
Dept: FAMILY MEDICINE | Facility: CLINIC | Age: 58
End: 2023-07-01
Payer: COMMERCIAL

## 2023-07-05 ENCOUNTER — NURSE TRIAGE (OUTPATIENT)
Dept: FAMILY MEDICINE | Facility: CLINIC | Age: 58
End: 2023-07-05
Payer: COMMERCIAL

## 2023-07-05 NOTE — TELEPHONE ENCOUNTER
"Pt felt pretty sure this was wood tick, not a deer tick  Areas now improved    Reason for Disposition    Tick bite with no complications    Additional Information    Negative: Not a tick bite    Negative: Patient sounds very sick or weak to the triager    Negative: Fever or severe headache occurs, 2 to 14 days following the bite    Negative: Widespread rash occurs, 2 to 14 days following the bite    Negative: Can't remove live tick (after using Care Advice)    Negative: Fever and spreading red area or streak    Negative: Fever and area is very tender to touch    Negative: Red streak or red line and length > 2 inches (5 cm)    Negative: Red or very tender (to touch) area and started over 24 hours after the bite    Negative: Red ring or bull's-eye rash occurs around a deer tick bite    Negative: Probable deer tick that was attached > 36 hours (or tick appears swollen, not flat)    Negative: Patient wants to be seen    Answer Assessment - Initial Assessment Questions  1. TYPE of TICK: \"Is it a wood tick or a deer tick?\" (e.g., deer tick, wood tick; unsure)      Wood tick  2. SIZE of TICK: \"How big is the tick?\" (e.g., size of poppy seed, apple seed, watermelon seed; unsure) Note: Deer ticks can be the size of a poppy seed (nymph) or an apple seed (adult).       larger  3. ENGORGED: \"Did the tick look flat or engorged (full, swollen)?\" (e.g., flat, engorged; unsure)      4. LOCATION: \"Where is the tick bite located?\"      Hip just small scab now, was red for one week  One the leg is larger than 50 cent piece size  5. ONSET: \"How long do you think the tick was attached before you removed it?\" (e.g., 5 hours, 2 days)      1 1/2 days  6. APPEARANCE of BITE or RASH: \"What does the site look like?\"    One on leg is red, bigger than 50 cent piece    Protocols used: TICK BITE-A-OH      "

## 2023-07-05 NOTE — TELEPHONE ENCOUNTER
Called to triage  This was wood tick , not deer tick  See triage note of 7/5/2023  No action needed at this time    Jaimie Fish, RN, BSN  [facility]

## 2023-10-04 ENCOUNTER — OFFICE VISIT (OUTPATIENT)
Dept: HEMATOLOGY | Facility: CLINIC | Age: 58
End: 2023-10-04
Attending: STUDENT IN AN ORGANIZED HEALTH CARE EDUCATION/TRAINING PROGRAM
Payer: COMMERCIAL

## 2023-10-04 VITALS
HEART RATE: 58 BPM | OXYGEN SATURATION: 97 % | DIASTOLIC BLOOD PRESSURE: 82 MMHG | BODY MASS INDEX: 27.29 KG/M2 | TEMPERATURE: 97.6 F | WEIGHT: 194.9 LBS | HEIGHT: 71 IN | SYSTOLIC BLOOD PRESSURE: 144 MMHG

## 2023-10-04 DIAGNOSIS — I82.4Z2 ACUTE DEEP VEIN THROMBOSIS (DVT) OF DISTAL VEIN OF LEFT LOWER EXTREMITY (H): Primary | ICD-10-CM

## 2023-10-04 PROCEDURE — 99213 OFFICE O/P EST LOW 20 MIN: CPT | Performed by: STUDENT IN AN ORGANIZED HEALTH CARE EDUCATION/TRAINING PROGRAM

## 2023-10-04 PROCEDURE — G0463 HOSPITAL OUTPT CLINIC VISIT: HCPCS | Performed by: STUDENT IN AN ORGANIZED HEALTH CARE EDUCATION/TRAINING PROGRAM

## 2023-10-04 NOTE — PROGRESS NOTES
[facility]  [facility] [place] [address]  Phone: [phone], Fax: [phone]    Outpatient Visit Note:    Patient: Scottie Eng  MRN: [id number]  : 1965  JOHN: Oct 4, 2023    Assessment:  Scottie Eng is a 57 year old man with LLE DVT in the context of a heterozygous prothrombin gene mutation, on prophylactic rivaroxaban.    Continues to do well on prophylactic rivaroxaban.  Will look into whether apixaban would be cheaper, and if so can switch to this.  Otherwise can follow-up annually.    Plan:  -Continue rivaroxaban 10 mg daily  -We will look into cost of apixaban for him  -Follow-up in 1 year with PA    The patient is given our center's contact information and is instructed to call if he should have any further questions or concerns.    Patient understands and agrees with the above plan and recommendation.    Jacob Cogan, MD  Hematology    30 minutes spent on the date of the encounter doing chart review, history and exam, documentation and further activities per the note     ----------------------------------------------------------------------------------------------------------------------  History of Present Illness:  Scottie Eng is a 57 year old man with a history of hypertension, sleep apnea, Gant's esophagus, and a recent DVT presenting for hematology evaluation.    He underwent an arthroscopic partial meniscectomy on his left knee on August 15.  On  he underwent lower extremity ultrasounds which showed a left occlusive thrombus of the popliteal vein, and occlusive thrombus of the gastrocnemius vein, an occlusive thrombus in one of the left posterior tibial veins, and a peroneal vein DVT.  This was his first VTE event of his life reportedly.  He received rivaroxaban, and has completed 3 months of therapy.  Thrombophilia testing has revealed that he has heterozygous for the prothrombin gene mutation.     Went  to [employer] within two weeks after surgery (5 hour drive). Able to resume normal activities. Has completed 3 months of AC. No fevers, night sweats, weight loss, rashes. Had had cscopes. Has anal fissues so some red blood occasionally.    March 28, 2023: Completed 3 months of therapeutic rivaroxaban since last visit giving him a total of 6 months of treatment. Notes leg swelling with prolonged sitting or activity. Remains very active.  No issues with bleeding.  Notes high cost of the medication ($80 a month).    May 30, 2023: Being evaluated for excessive daytime sleepiness and possible restless leg syndrome, with a plan for evaluation of iron stores.  Started on trial of gabapentin for this. Notes ongoing LLE edema intermittently, resolves with leg elevation and sleeping well. No bleeding or bruising.    October 4, 2023: Doing well overall.  Some occasional leg swelling, improved with compression stockings.  Not activity limiting.  Some bleeding when cutting himself, but not bothersome day to day.  No issues with taking rivaroxaban, though wondering if there is a cheaper option..    Past Medical History:  Past Medical History:   Diagnosis Date     Gant's esophagus     followed by [facility]     GERD (gastroesophageal reflux disease)      Hypertension kellen     Leg DVT (deep venous thromboembolism), acute (H) 09/2022       Past Surgical History:  Past Surgical History:   Procedure Laterality Date     ARTHROSCOPY KNEE WITH MENISCAL REPAIR Left 8/5/2022    Procedure: left knee examination under anesthesia, knee arthroscopy, meniscectomy;  Surgeon: Benny Carrasquillo MD;  Location: [facility] OR     AS REPAIR CRUCIATE LIGAMENT,KNEE Left 2005     COLONOSCOPY  2014, 2017, 2019     SEPTOPLASTY, ENDOSCOPIC SINUS SURGERY, COMBINED  1978     STRIP VEIN       [facility] ORAL SURGERY PROCEDURE  1981       Medications:  Current Outpatient Medications   Medication Sig Dispense Refill     amLODIPine-benazepril  (LOTREL) 10-40 MG capsule Take 1 capsule by mouth daily 90 capsule 3     gabapentin (NEURONTIN) 300 MG capsule Take 2 capsules (600 mg) by mouth At Bedtime 60 capsule 11     hydrocortisone 2.5 % cream Apply topically 2 times daily Do not use for more than 10 days on one area of skin. 30 g 1     omeprazole (PRILOSEC) 20 MG DR capsule        rivaroxaban ANTICOAGULANT (XARELTO) 10 MG TABS tablet Take 1 tablet (10 mg) by mouth daily (with dinner) for 360 days 30 tablet 11        Allergies:  Allergies   Allergen Reactions     Seasonal Allergies        ROS:  Remainder of a comprehensive 14 point ROS is negative unless noted above.    Social History:  Denies any tobacco use. No significant alcohol use. Denies any illicit drug use.     Family History:  No family history of VTE    Objectives:  N/a, video visit    Labs:  WBC   Date Value Ref Range Status   08/10/2008 7.2 4.0 - 11.0 10e9/L Final     WBC Count   Date Value Ref Range Status   12/27/2022 6.9 4.0 - 11.0 10e3/uL Final     Hemoglobin   Date Value Ref Range Status   12/27/2022 15.9 13.3 - 17.7 g/dL Final   07/19/2022 14.6 13.3 - 17.7 g/dL Final   08/10/2008 15.0 13.3 - 17.7 g/dL Final     Hematocrit   Date Value Ref Range Status   12/27/2022 46.2 40.0 - 53.0 % Final   08/10/2008 43.9 40.0 - 53.0 % Final     Platelet Count   Date Value Ref Range Status   12/27/2022 276 150 - 450 10e3/uL Final   08/10/2008 267 150 - 450 10e9/L Final         Imaging:  US Lower Extremity Venous Duplex Left  Narrative: Exam: Ultrasound of the deep venous system of left leg dated  12/13/2022 7:48 AM    Clinical information: Acute deep vein thrombosis (DVT) of distal vein  of left lower extremity    Comparison: Ultrasound 9/27/2022    Ordering provider: KARLOS KNOWLES    Technique: Gray-scale evaluation with compression and Doppler  assessment of deep venous system for spontaneous and phasic flow, as  well as the presence of distal augmentation. Color flow images  obtained as needed.  "Gray-scale images with compression of the great  saphenous vein obtained as needed.    Findings:    Right leg:    CFV: Thrombus: No, Phasic: Yes    Left leg:    CFV: Thrombus: No, Phasic: Yes  Femoral vein, proximal: Thrombus: No, Phasic: Yes  Femoral vein, mid: Thrombus: No, Phasic: Yes  Femoral vein, distal: Thrombus: No, Phasic: Yes  Popliteal vein: Thrombus: Yes, partially compressible. Phasic: Yes  Gastrocnemius vein: Thrombus: Yes, partially compressible.   PTV: Thrombus: Thrombus: No  Peroneal vein: Thrombus: No  Impression: Impression:    1. Overall decreased DVT burden compared to prior.  A. Popliteal vein nonocclusive deep vein thrombosis.  B. Gastrocnemius vein nonocclusive deep vein thrombosis  C. Resolution of deep vein thrombosis within the posterior tibial and  peroneal veins.    Reference: \"Duplex Ultrasound in the Diagnosis of Lower-Extremity Deep  Venous Thrombosis\"- Pratibha Christie MD, S; Amor Gentile MD  (Circulation. 2014;129:917-921. http://circ.ahajournals.org )    I have personally reviewed the examination and initial interpretation  and I agree with the findings.    SYD COOK         SYSTEM ID:  [id number]      "

## 2023-12-14 ASSESSMENT — ENCOUNTER SYMPTOMS
EYE PAIN: 0
NAUSEA: 0
HEARTBURN: 0
SORE THROAT: 0
CHILLS: 0
MYALGIAS: 1
FREQUENCY: 0
NERVOUS/ANXIOUS: 0
SHORTNESS OF BREATH: 0
CONSTIPATION: 0
HEMATOCHEZIA: 0
ARTHRALGIAS: 1
WEAKNESS: 0
HEMATURIA: 0
HEADACHES: 0
PALPITATIONS: 0
PARESTHESIAS: 0
JOINT SWELLING: 0
DIZZINESS: 0
DYSURIA: 0
DIARRHEA: 0
COUGH: 0
ABDOMINAL PAIN: 0
FEVER: 0

## 2023-12-20 PROBLEM — I10 HYPERTENSION GOAL BP (BLOOD PRESSURE) < 140/90: Status: ACTIVE | Noted: 2023-12-20

## 2023-12-21 ENCOUNTER — OFFICE VISIT (OUTPATIENT)
Dept: FAMILY MEDICINE | Facility: CLINIC | Age: 58
End: 2023-12-21
Payer: COMMERCIAL

## 2023-12-21 VITALS
BODY MASS INDEX: 25.81 KG/M2 | RESPIRATION RATE: 20 BRPM | HEIGHT: 72 IN | OXYGEN SATURATION: 98 % | TEMPERATURE: 97.2 F | DIASTOLIC BLOOD PRESSURE: 80 MMHG | WEIGHT: 190.6 LBS | HEART RATE: 66 BPM | SYSTOLIC BLOOD PRESSURE: 118 MMHG

## 2023-12-21 DIAGNOSIS — I10 HYPERTENSION GOAL BP (BLOOD PRESSURE) < 140/90: ICD-10-CM

## 2023-12-21 DIAGNOSIS — Z23 NEED FOR VACCINATION: ICD-10-CM

## 2023-12-21 DIAGNOSIS — Z00.00 ROUTINE GENERAL MEDICAL EXAMINATION AT A HEALTH CARE FACILITY: Primary | ICD-10-CM

## 2023-12-21 PROCEDURE — 99213 OFFICE O/P EST LOW 20 MIN: CPT | Mod: 25 | Performed by: FAMILY MEDICINE

## 2023-12-21 PROCEDURE — 90471 IMMUNIZATION ADMIN: CPT | Performed by: FAMILY MEDICINE

## 2023-12-21 PROCEDURE — 36415 COLL VENOUS BLD VENIPUNCTURE: CPT | Performed by: FAMILY MEDICINE

## 2023-12-21 PROCEDURE — 80048 BASIC METABOLIC PNL TOTAL CA: CPT | Performed by: FAMILY MEDICINE

## 2023-12-21 PROCEDURE — 99396 PREV VISIT EST AGE 40-64: CPT | Mod: 25 | Performed by: FAMILY MEDICINE

## 2023-12-21 PROCEDURE — 90746 HEPB VACCINE 3 DOSE ADULT IM: CPT | Performed by: FAMILY MEDICINE

## 2023-12-21 RX ORDER — AMLODIPINE AND BENAZEPRIL HYDROCHLORIDE 10; 40 MG/1; MG/1
1 CAPSULE ORAL DAILY
Qty: 90 CAPSULE | Refills: 3 | Status: SHIPPED | OUTPATIENT
Start: 2023-12-21 | End: 2024-09-17 | Stop reason: SINTOL

## 2023-12-21 ASSESSMENT — ENCOUNTER SYMPTOMS
HEMATOCHEZIA: 0
MYALGIAS: 1
FEVER: 0
NERVOUS/ANXIOUS: 0
SORE THROAT: 0
DIZZINESS: 0
HEARTBURN: 0
PARESTHESIAS: 0
ABDOMINAL PAIN: 0
CHILLS: 0
NAUSEA: 0
SHORTNESS OF BREATH: 0
HEADACHES: 0
WEAKNESS: 0
ARTHRALGIAS: 1
HEMATURIA: 0
CONSTIPATION: 0
COUGH: 0
PALPITATIONS: 0
JOINT SWELLING: 0
FREQUENCY: 0
EYE PAIN: 0
DYSURIA: 0
DIARRHEA: 0

## 2023-12-21 ASSESSMENT — PAIN SCALES - GENERAL: PAINLEVEL: NO PAIN (0)

## 2023-12-21 NOTE — NURSING NOTE
Prior to immunization administration, verified patients identity using patient s name and date of birth. Please see Immunization Activity for additional information.     Screening Questionnaire for Adult Immunization    Are you sick today?   No   Do you have allergies to medications, food, a vaccine component or latex?   No   Have you ever had a serious reaction after receiving a vaccination?   No   Do you have a long-term health problem with heart, lung, kidney, or metabolic disease (e.g., diabetes), asthma, a blood disorder, no spleen, complement component deficiency, a cochlear implant, or a spinal fluid leak?  Are you on long-term aspirin therapy?   No   Do you have cancer, leukemia, HIV/AIDS, or any other immune system problem?   No   Do you have a parent, brother, or sister with an immune system problem?   No   In the past 3 months, have you taken medications that affect  your immune system, such as prednisone, other steroids, or anticancer drugs; drugs for the treatment of rheumatoid arthritis, Crohn s disease, or psoriasis; or have you had radiation treatments?   No   Have you had a seizure, or a brain or other nervous system problem?   No   During the past year, have you received a transfusion of blood or blood    products, or been given immune (gamma) globulin or antiviral drug?   No   For women: Are you pregnant or is there a chance you could become       pregnant during the next month?   No   Have you received any vaccinations in the past 4 weeks?   No     Immunization questionnaire answers were all negative.      Patient instructed to remain in clinic for 15 minutes afterwards, and to report any adverse reactions.     Screening performed by Jamaica Acosta MA on 12/21/2023 at 2:53 PM.

## 2023-12-21 NOTE — PATIENT INSTRUCTIONS
"  If you have MyChart:  1) I kindly request that you check your MyChart prior to all appointments with me and complete any assigned questionnaires ahead of time.    2) You may receive auto-released results from our system before I have the opportunity to review and comment.  Be assured I will review and comment on all of your results as soon as I can.      FYI:  My schedule has been booking out very far in advance (2 months).  I apologize for the lack of timely access.  If you need to be seen for a chronic condition or preventive (wellness) visit, please be sure to schedule that appointment 2-3 months in advance.  If you have a concern that you feel cannot wait until my next available appointment (such as a hospital follow-up, pre-op, or new symptom of concern) please call clinic at [phone] and press #2.  Then ask to speak to one of the [facility] nurses who can often offer you an appointment with me within a week or so.    I do ask that all patients who are taking chronic medications for conditions that I am managing schedule an in-person visit with me at least once a year.     To schedule any ordered imaging studies (including mammogram and/or DEXA scan) you can call [facility] Scheduling at [phone].         Patient Education    Hypertension is a disease of the blood vessels.  It is not a reflection of mental tension or stress.      Hypertension means that the blood flowing through your arteries is causing an abnormal amount of pressure or strain on the walls of the blood vessels.  Over time this causes the blood vessels to become stiff and diseased.  During this time there are usually no symptoms.  Symptoms occur later, once that damage has been done.  That is why hypertension is considered a \"silent killer.\"    Over time this constant pressure on the blood vessels leads to vascular disease and poor blood flow to the major organs.  This can lead to heart disease (when the blood vessels to the " heart are involved), stroke (when the blood vessels to the brain are involved), kidney failure (when the blood vessels to the kidneys are involved), and leg pain/inability to walk (when the blood vessels to the legs are involved).      Normal blood pressure is defined as < 120/80.  An ideal blood pressure would be 110/70.  The average person's blood pressure increases 5-8 mmHg with each decade of life.      Hypertension is the major risk factor for chronic kidney disease and stroke.  A systolic blood pressure (the upper number) of 150 is associated with an 8-fold increased risk of stroke compared to a systolic blood pressure of 110.      Hypertension can be treated with diet, exercise, and medication.      The DASH diet can help lower blood pressure.  It is a plant-based diet that focuses on fruits, vegetables, whole grains, low-fat dairy products, and very little meat.  It includes one serving of nuts, seeds, or legumes per day.  Sodium intake is limited to 2400 mg per day and replaced by foods that are high in potassium (bananas, potatoes, melons, avocados, and tomatoes) or high in calcium (low-fat dairy).      Decreasing alcohol consumption also improves blood pressure; drinking should be limited to no more than one serving of alcohol per day.       Exercising 150 minutes per week (30 minutes/day for 5 days/week) and avoiding weight gain also improves blood pressure.  If overweight, a weight loss of 10 pounds can reduce the systolic blood pressure by 5 mmHg.       Preventive Health Recommendations  Male Ages 50 - 64    Yearly exam:             See your health care provider every year in order to  o   Review health changes.   o   Discuss preventive care.    o   Review your medicines if your doctor has prescribed any.   Have a cholesterol test every 5 years, or more frequently if you are at risk for high cholesterol/heart disease.   Have a diabetes test (fasting glucose) every three years. If you are at risk for  diabetes, you should have this test more often.   Have a colonoscopy at age 45, or have a yearly FIT test (stool test). These exams will check for colon cancer.    Talk with your health care provider about whether or not a prostate cancer screening test (PSA) is right for you.  You should be tested each year for STDs (sexually transmitted diseases), if you re at risk.     Shots: Get a flu shot each year. Get a tetanus shot every 10 years.     Nutrition:  Eat at least 5 servings of fruits and vegetables daily.   Eat whole-grain bread, whole-wheat pasta and brown rice instead of white grains and rice.   Get adequate Calcium and Vitamin D.     Lifestyle  Exercise for at least 150 minutes a week (30 minutes a day, 5 days a week). This will help you control your weight and prevent disease.   Limit alcohol to one drink per day.   No smoking.   Wear sunscreen to prevent skin cancer.   See your dentist every six months for an exam and cleaning.   See your eye doctor every 1 to 2 years.

## 2023-12-21 NOTE — PROGRESS NOTES
SUBJECTIVE:   Nathanael is a 58 year old, presenting for the following:  Physical        2023     1:52 PM   Additional Questions   Roomed by Jamaica   Accompanied by alone         2023     1:52 PM   Patient Reported Additional Medications   Patient reports taking the following new medications none       Healthy Habits:     Getting at least 3 servings of Calcium per day:  Yes    Bi-annual eye exam:  Yes    Dental care twice a year:  Yes    Sleep apnea or symptoms of sleep apnea:  None    Diet:  Regular (no restrictions)    Frequency of exercise:  6-7 days/week    Duration of exercise:  15-30 minutes    Taking medications regularly:  Yes    Medication side effects:  None    Additional concerns today:  Yes      Last ate 30 minutes ago     HTN - does not check BP outside of clinic. Chronic LE edema.  Wears compression stockings.    Chronic anticoagulation - just donated blood so Hgb was acceptable.        Social History     Tobacco Use    Smoking status: Former     Packs/day: 0.00     Years: 15.00     Additional pack years: 0.00     Total pack years: 0.00     Types: Cigarettes     Start date: 1980     Quit date: 1996     Years since quittin.7    Smokeless tobacco: Former     Quit date: 1982    Tobacco comments:     quit 25 yrs ago   Substance Use Topics    Alcohol use: Yes     Alcohol/week: 3.0 standard drinks of alcohol     Comment: social             2023    11:26 AM   Alcohol Use   Prescreen: >3 drinks/day or >7 drinks/week? No       Last PSA:   Prostate Specific Antigen Screen   Date Value Ref Range Status   2022 0.69 0.00 - 4.00 ug/L Final       Reviewed orders with patient. Reviewed health maintenance and updated orders accordingly - Yes  BP Readings from Last 3 Encounters:   23 118/80   10/04/23 (!) 144/82   23 123/72    Wt Readings from Last 3 Encounters:   23 86.5 kg (190 lb 9.6 oz)   10/04/23 88.4 kg (194 lb 14.4 oz)   23 89 kg (196 lb 3.2 oz)                Wt Readings from Last 5 Encounters:   12/21/23 86.5 kg (190 lb 9.6 oz)   10/04/23 88.4 kg (194 lb 14.4 oz)   06/30/23 89 kg (196 lb 3.2 oz)   03/30/23 91 kg (200 lb 9.6 oz)   03/28/23 90.9 kg (200 lb 8 oz)         Reviewed and updated as needed this visit by clinical staff   Tobacco  Allergies  Meds  Problems             Reviewed and updated as needed this visit by Provider     Meds  Problems            Past Medical History:   Diagnosis Date    Gant's esophagus     followed by [facility]    GERD (gastroesophageal reflux disease)     Hypertension kellen    Leg DVT (deep venous thromboembolism), acute (H) 09/2022      Past Surgical History:   Procedure Laterality Date    ARTHROSCOPY KNEE WITH MENISCAL REPAIR Left 8/5/2022    Procedure: left knee examination under anesthesia, knee arthroscopy, meniscectomy;  Surgeon: Benny Carrasquillo MD;  Location: [facility] OR    AS REPAIR CRUCIATE LIGAMENT,KNEE Left 2005    COLONOSCOPY  2014, 2017, 2019    SEPTOPLASTY, ENDOSCOPIC SINUS SURGERY, COMBINED  1978    STRIP VEIN      [facility] ORAL SURGERY PROCEDURE  1981       Review of Systems   Constitutional:  Negative for chills and fever.   HENT:  Negative for congestion, ear pain, hearing loss and sore throat.    Eyes:  Negative for pain and visual disturbance.   Respiratory:  Negative for cough and shortness of breath.    Cardiovascular:  Negative for chest pain, palpitations and peripheral edema.   Gastrointestinal:  Negative for abdominal pain, constipation, diarrhea, heartburn, hematochezia and nausea.   Genitourinary:  Negative for dysuria, frequency, genital sores, hematuria, impotence, penile discharge and urgency.   Musculoskeletal:  Positive for arthralgias and myalgias. Negative for joint swelling.   Skin:  Negative for rash.   Neurological:  Negative for dizziness, weakness, headaches and paresthesias.   Psychiatric/Behavioral:  Negative for mood changes. The patient is not nervous/anxious.   "      OBJECTIVE:   /80 (BP Location: Right arm, Patient Position: Sitting, Cuff Size: Adult Regular)   Pulse 66   Temp 97.2  F (36.2  C) (Temporal)   Resp 20   Ht 1.83 m (6' 0.05\")   Wt 86.5 kg (190 lb 9.6 oz)   SpO2 98%   BMI 25.82 kg/m      Physical Exam  GENERAL: healthy, alert and no distress  EYES: Eyes grossly normal to inspection, conjunctivae and sclerae normal  HENT: ear canals and TM's normal  NECK: no adenopathy, no asymmetry, masses, or scars and thyroid normal to palpation  RESP: lungs clear to auscultation - no rales, rhonchi or wheezes  CV: regular rate and rhythm, normal S1 S2, no S3 or S4, no murmur, click or rub, no peripheral edema  ABDOMEN: soft, nontender, no hepatosplenomegaly, no masses  MS: no gross musculoskeletal defects noted, no edema  SKIN: no suspicious lesions or rashes  NEURO: Grossly normal strength and tone, mentation intact and speech normal  PSYCH: mentation appears normal, affect normal/bright      ASSESSMENT/PLAN:     Routine general medical examination at a health care facility  Reviewed/updated Health Maintenance     Hypertension goal BP (blood pressure) < 140/90  stable/well controlled - Continue current medication regimen. Consider switching amlodipine to diuretic.    - Basic metabolic panel  (Ca, Cl, CO2, Creat, Gluc, K, Na, BUN)  - amLODIPine-benazepril (LOTREL) 10-40 MG capsule  Dispense: 90 capsule; Refill: 3    Need for vaccination  Hep B #3  - HEPATITIS B, ADULT 20+ (ENGERIX-B/RECOMBIVAX HB)       COUNSELING:   Reviewed preventive health counseling, as reflected in patient instructions      BMI:   Estimated body mass index is 25.82 kg/m  as calculated from the following:    Height as of this encounter: 1.83 m (6' 0.05\").    Weight as of this encounter: 86.5 kg (190 lb 9.6 oz).       He reports that he quit smoking about 27 years ago. His smoking use included cigarettes. He started smoking about 43 years ago. He quit smokeless tobacco use about 41 years " ago.      Melissa Healy MD  [facility]

## 2023-12-22 LAB
ANION GAP SERPL CALCULATED.3IONS-SCNC: 8 MMOL/L (ref 7–15)
BUN SERPL-MCNC: 16.6 MG/DL (ref 6–20)
CALCIUM SERPL-MCNC: 9.3 MG/DL (ref 8.6–10)
CHLORIDE SERPL-SCNC: 104 MMOL/L (ref 98–107)
CREAT SERPL-MCNC: 0.93 MG/DL (ref 0.67–1.17)
DEPRECATED HCO3 PLAS-SCNC: 27 MMOL/L (ref 22–29)
EGFRCR SERPLBLD CKD-EPI 2021: >90 ML/MIN/1.73M2
GLUCOSE SERPL-MCNC: 98 MG/DL (ref 70–99)
POTASSIUM SERPL-SCNC: 4.7 MMOL/L (ref 3.4–5.3)
SODIUM SERPL-SCNC: 139 MMOL/L (ref 135–145)

## 2023-12-22 NOTE — RESULT ENCOUNTER NOTE
David Huffman,  Your basic metabolic panel results (blood salts, blood sugar, and kidney function) are all normal.    Melissa Healy MD

## 2024-04-12 ENCOUNTER — TELEPHONE (OUTPATIENT)
Dept: HEMATOLOGY | Facility: CLINIC | Age: 59
End: 2024-04-12
Payer: COMMERCIAL

## 2024-04-12 DIAGNOSIS — I82.4Z2 ACUTE DEEP VEIN THROMBOSIS (DVT) OF DISTAL VEIN OF LEFT LOWER EXTREMITY (H): ICD-10-CM

## 2024-04-12 NOTE — CONFIDENTIAL NOTE
[id number]  Scottie Eng  58 year old male  [facility] Diagnosis: prothrombin gene mutation/LLE DVT  [facility] Provider: Cogan    Incoming call from Nathanael regarding need for refill of Xarelto 10 mg daily. Last evaluated in clinic on 10/23 with plan to remain on Xarelto 10 mg daily and rtc in 1 year.     Slime Pacheco  MSN, RN, PHN  [facility]  Office: [phone]  Fax: [phone]

## 2024-09-02 ENCOUNTER — MYC MEDICAL ADVICE (OUTPATIENT)
Dept: FAMILY MEDICINE | Facility: CLINIC | Age: 59
End: 2024-09-02
Payer: COMMERCIAL

## 2024-09-02 DIAGNOSIS — M25.529 ELBOW PAIN, UNSPECIFIED LATERALITY: Primary | ICD-10-CM

## 2024-09-04 NOTE — TELEPHONE ENCOUNTER
Writer responded via [employer].  Yday ANGELES, BSN, PHN, RN  [facility]  [phone]

## 2024-09-09 NOTE — TELEPHONE ENCOUNTER
"Too much back and forth.  He needs appointment.  Please schedule in person - use \"APPROVAL REQ\" slot.  "

## 2024-09-17 ENCOUNTER — OFFICE VISIT (OUTPATIENT)
Dept: FAMILY MEDICINE | Facility: CLINIC | Age: 59
End: 2024-09-17
Payer: COMMERCIAL

## 2024-09-17 VITALS
DIASTOLIC BLOOD PRESSURE: 72 MMHG | HEART RATE: 67 BPM | BODY MASS INDEX: 26.37 KG/M2 | HEIGHT: 73 IN | RESPIRATION RATE: 15 BRPM | WEIGHT: 199 LBS | OXYGEN SATURATION: 95 % | TEMPERATURE: 97.3 F | SYSTOLIC BLOOD PRESSURE: 112 MMHG

## 2024-09-17 DIAGNOSIS — I87.2 CHRONIC VENOUS INSUFFICIENCY: ICD-10-CM

## 2024-09-17 DIAGNOSIS — R60.0 BILATERAL LOWER EXTREMITY EDEMA: ICD-10-CM

## 2024-09-17 DIAGNOSIS — M77.01 MEDIAL EPICONDYLITIS OF ELBOW, RIGHT: ICD-10-CM

## 2024-09-17 DIAGNOSIS — Z23 NEED FOR VACCINATION: ICD-10-CM

## 2024-09-17 DIAGNOSIS — I10 HYPERTENSION GOAL BP (BLOOD PRESSURE) < 140/90: Primary | ICD-10-CM

## 2024-09-17 PROCEDURE — 90673 RIV3 VACCINE NO PRESERV IM: CPT | Performed by: FAMILY MEDICINE

## 2024-09-17 PROCEDURE — 90471 IMMUNIZATION ADMIN: CPT | Performed by: FAMILY MEDICINE

## 2024-09-17 PROCEDURE — 99214 OFFICE O/P EST MOD 30 MIN: CPT | Mod: 25 | Performed by: FAMILY MEDICINE

## 2024-09-17 RX ORDER — VALSARTAN 80 MG/1
80 TABLET ORAL DAILY
Qty: 30 TABLET | Refills: 0 | Status: SHIPPED | OUTPATIENT
Start: 2024-09-17

## 2024-09-17 RX ORDER — HYDROCHLOROTHIAZIDE 12.5 MG/1
12.5 TABLET ORAL DAILY
Qty: 30 TABLET | Refills: 0 | Status: SHIPPED | OUTPATIENT
Start: 2024-09-17

## 2024-09-17 NOTE — PROGRESS NOTES
Assessment & Plan     Hypertension goal BP (blood pressure) < 140/90  Discussed that amlodipine may be exacerbating LE edema.  We'll switch his regimen from amlodipine-benazepril to HCTZ and valsartan.  I'll see him in 3 weeks to assess need for dose adjustment and get BMP.  Discussed lifestyle approaches to improving BP including exercise, DASH diet with low sodium and high potassium, reduced alcohol (no more than one drink/day and 7 drinks/week).    - hydroCHLOROthiazide 12.5 MG tablet  Dispense: 30 tablet; Refill: 0  - valsartan (DIOVAN) 80 MG tablet  Dispense: 30 tablet; Refill: 0    Medial epicondylitis of elbow, right  - Physical Therapy  Referral    Chronic venous insufficiency  Bilateral lower extremity edema  Discussed relevant anatomy, pathophysiology, and etiology of this condition, including anatomy/physiology of the veins which must return blood to heart against gravity.  Discussed risk factors for chronic venous insufficiency and exacerbating factors including salt load and prolonged standing in one place.  Reviewed treatment options, including elevation, compression stockings, and diuretics.       Need for vaccination  - INFLUENZA VACCINE TRIVALENT(FLUBLOK)    See Patient Instructions    The longitudinal plan of care for the diagnosis(es)/condition(s) as documented were addressed during this visit. Due to the added complexity in care, I will continue to support Nathanael in the subsequent management and with ongoing continuity of care.    Subjective   Nathanael is a 59 year old, presenting for the following health issues:  Edema        9/17/2024    11:47 AM   Additional Questions   Roomed by Erin Arzola MA     History of Present Illness       Hypertension: He presents for follow up of hypertension.  He does not check blood pressure  regularly outside of the clinic. Outpatient blood pressures have not been over 140/90. He does not follow a low salt diet.     He eats 2-3 servings of fruits and  "vegetables daily.He consumes 1 sweetened beverage(s) daily.He exercises with enough effort to increase his heart rate 30 to 60 minutes per day.  He exercises with enough effort to increase his heart rate 3 or less days per week.   He is taking medications regularly.     Ankle swelling - bilateral.  Had DVT in left LE and notes left LE gets more swollen than right.  He wears compression stocking.  He's on amlodipine.    Elbow tendinitis - golfer's elbow symptoms on right elbow.  Ongoing x  6 months.  Worse with playing tennis or golf.  Saw chiropractor.  Massage therapist felt his shoulder and rotator cuff were too tight.  He is mostly RHD.        Objective    /72   Pulse 67   Temp 97.3  F (36.3  C) (Temporal)   Resp 15   Ht 1.854 m (6' 1\")   Wt 90.3 kg (199 lb)   SpO2 95%   BMI 26.25 kg/m    Body mass index is 26.25 kg/m .  Physical Exam   GEN:  no apparent distress  SHOULDER:  Inspection of right shoulder reveals no deformity/asymmetry.  ROM is full and painless.  No tenderness to palpation over the bony landmarks.   Impingement signs negative.  ELBOW:  right elbow with tenderness to palpation over the medial epicondyle.  No pain with forced supination and pronation.  No pain with forced wrist extension or flexion.  BILATERAL LOWER EXTREMITIES:  with 1+ pitting edema        Signed Electronically by: Melissa Healy MD    "

## 2024-09-17 NOTE — PATIENT INSTRUCTIONS
"Hypertension is a disease of the blood vessels.  It is not a reflection of mental tension or stress.      Hypertension means that the blood flowing through your arteries is causing an abnormal amount of pressure or strain on the walls of the blood vessels.  Over time this causes the blood vessels to become stiff and diseased.  During this time there are usually no symptoms.  Symptoms occur later, once that damage has been done.  That is why hypertension is considered a \"silent killer.\"    Over time this constant pressure on the blood vessels leads to vascular disease and poor blood flow to the major organs.  This can lead to heart disease (when the blood vessels to the heart are involved), stroke (when the blood vessels to the brain are involved), kidney failure (when the blood vessels to the kidneys are involved), and leg pain/inability to walk (when the blood vessels to the legs are involved).      Normal blood pressure is defined as < 120/80.  An ideal blood pressure would be 110/70.  The average person's blood pressure increases 5-8 mmHg with each decade of life.      Hypertension is the major risk factor for chronic kidney disease and stroke.  A systolic blood pressure (the upper number) of 150 is associated with an 8-fold increased risk of stroke compared to a systolic blood pressure of 110.      Hypertension can be treated with diet, exercise, and medication.      The DASH diet can help lower blood pressure.  It is a plant-based diet that focuses on fruits, vegetables, whole grains, low-fat dairy products, and very little meat.  It includes one serving of nuts, seeds, or legumes per day.  Sodium intake is limited to 2400 mg per day and replaced by foods that are high in potassium (bananas, potatoes, melons, avocados, and tomatoes) or high in calcium (low-fat dairy).      Decreasing alcohol consumption also improves blood pressure; drinking should be limited to no more than one serving of alcohol per day.   "     Exercising 150 minutes per week (30 minutes/day for 5 days/week) and avoiding weight gain also improves blood pressure.  If overweight, a weight loss of 10 pounds can reduce the systolic blood pressure by 5 mmHg.

## 2024-10-11 NOTE — PROGRESS NOTES
[facility]  [facility] [place] [address]  Phone: [phone], Fax: [phone]    Outpatient Visit Note:    Patient: Scottie Eng  MRN: [id number]  : 1965  JOHN: Oct 15, 2024    History of Present Illness:  Scottie Eng is a 57 year old man with a history of hypertension, sleep apnea, Gant's esophagus, and a recent DVT presenting for hematology evaluation.    He underwent an arthroscopic partial meniscectomy on his left knee on August 15.  On  he underwent lower extremity ultrasounds which showed a left occlusive thrombus of the popliteal vein, and occlusive thrombus of the gastrocnemius vein, an occlusive thrombus in one of the left posterior tibial veins, and a peroneal vein DVT.  This was his first VTE event of his life reportedly.  He received rivaroxaban, and has completed 3 months of therapy.  Thrombophilia testing has revealed that he has heterozygous for the prothrombin gene mutation.     Went to [facility] within two weeks after surgery (5 hour drive). Able to resume normal activities. Has completed 3 months of AC. No fevers, night sweats, weight loss, rashes. Had had cscopes. Has anal fissues so some red blood occasionally.    2023: Completed 3 months of therapeutic rivaroxaban since last visit giving him a total of 6 months of treatment. Notes leg swelling with prolonged sitting or activity. Remains very active.  No issues with bleeding.  Notes high cost of the medication ($80 a month).    May 30, 2023: Being evaluated for excessive daytime sleepiness and possible restless leg syndrome, with a plan for evaluation of iron stores.  Started on trial of gabapentin for this. Notes ongoing LLE edema intermittently, resolves with leg elevation and sleeping well. No bleeding or bruising.    2023: Doing well overall.  Some occasional leg swelling, improved with compression stockings.  Not  activity limiting.  Some bleeding when cutting himself, but not bothersome day to day.  No issues with taking rivaroxaban, though wondering if there is a cheaper option.    October 15, 2024: Had a change in anti-hypertensive regimen. Feeling well in general. Generally no bleeding issues, though one incident of a lot of bleeding when looking for a golf ball.      Medications:  Current Outpatient Medications   Medication Sig Dispense Refill    hydroCHLOROthiazide 12.5 MG tablet Take 1 tablet (12.5 mg) by mouth daily. 30 tablet 0    hydrocortisone 2.5 % cream Apply topically 2 times daily Do not use for more than 10 days on one area of skin. (Patient not taking: Reported on 12/21/2023) 30 g 1    omeprazole (PRILOSEC) 20 MG DR capsule       rivaroxaban ANTICOAGULANT (XARELTO) 10 MG TABS tablet Take 1 tablet (10 mg) by mouth daily (with dinner) 30 tablet 5    valsartan (DIOVAN) 80 MG tablet Take 1 tablet (80 mg) by mouth daily. 30 tablet 0        Assessment:  Scottie Eng is a 57 year old man with LLE DVT in the context of a heterozygous prothrombin gene mutation, on prophylactic rivaroxaban.    We discussed that at this point we can consider discontinuing daily anticoagulation. He would like to stop daily anticoagulation due to cost and not wanting to take daily medication.  I think this is reasonable, as his DVT was likely provoked in the context of the postoperative period after meniscectomy and recent long car trips.  I also think the heterozygous prothrombin gene mutation is not justification for daily anticoagulation in his case, as his only thrombosis event was later in life in the postsurgical period rather than an unprovoked clot at a young age.    I recommend he use anticoagulation in high risk situations. This includes, but is not limited to, long trips (e.g. car drives or plane flights longer than 5 hours), postsurgical periods, hospitalizations, trauma and immobilization. After surgery, he should  receive anticoagulation for 6 weeks post-operatively.    Plan:  -Stop daily anticoagulation  -Anticoagulation prophylaxis in high risk situations as above  -Follow up in 1 year    Jacob Cogan, MD  Hematology    40 minutes spent on the date of the encounter doing chart review, history and exam, documentation and further activities per the note    This note was completed in part using dictation via the Dragon voice recognition software. Some word and grammatical errors may occur and must be interpreted in the appropriate clinical context. If there are any questions pertaining to this issue, please contact me for further clarification.     The longitudinal plan of care for the diagnosis(es)/condition(s) as documented were addressed during this visit. Due to the added complexity in care, I will continue to support Nathanael in the subsequent management and with ongoing continuity of care.    Video-Visit Details:  Type of service:  Video Visit  Video Start Time: 1:49 PM  Video End Time (time video stopped): 2:00  Originating Location (pt. Location): Home  Distant Location (provider location):  [facility]   Mode of Communication:  Video Conference via Behalf

## 2024-10-13 ENCOUNTER — PATIENT OUTREACH (OUTPATIENT)
Dept: CARE COORDINATION | Facility: CLINIC | Age: 59
End: 2024-10-13
Payer: COMMERCIAL

## 2024-10-15 ENCOUNTER — OFFICE VISIT (OUTPATIENT)
Dept: FAMILY MEDICINE | Facility: CLINIC | Age: 59
End: 2024-10-15
Payer: COMMERCIAL

## 2024-10-15 ENCOUNTER — VIRTUAL VISIT (OUTPATIENT)
Dept: HEMATOLOGY | Facility: CLINIC | Age: 59
End: 2024-10-15
Attending: STUDENT IN AN ORGANIZED HEALTH CARE EDUCATION/TRAINING PROGRAM
Payer: COMMERCIAL

## 2024-10-15 VITALS — WEIGHT: 199 LBS | BODY MASS INDEX: 26.37 KG/M2

## 2024-10-15 VITALS
DIASTOLIC BLOOD PRESSURE: 87 MMHG | SYSTOLIC BLOOD PRESSURE: 144 MMHG | HEIGHT: 73 IN | WEIGHT: 199 LBS | HEART RATE: 64 BPM | BODY MASS INDEX: 26.37 KG/M2 | OXYGEN SATURATION: 96 % | TEMPERATURE: 97.7 F | RESPIRATION RATE: 16 BRPM

## 2024-10-15 DIAGNOSIS — I82.4Z2 ACUTE DEEP VEIN THROMBOSIS (DVT) OF DISTAL VEIN OF LEFT LOWER EXTREMITY (H): Primary | ICD-10-CM

## 2024-10-15 DIAGNOSIS — Z23 NEED FOR VACCINATION: ICD-10-CM

## 2024-10-15 DIAGNOSIS — Z12.5 SCREENING FOR PROSTATE CANCER: ICD-10-CM

## 2024-10-15 DIAGNOSIS — I10 HYPERTENSION GOAL BP (BLOOD PRESSURE) < 140/90: Primary | ICD-10-CM

## 2024-10-15 LAB
ANION GAP SERPL CALCULATED.3IONS-SCNC: 11 MMOL/L (ref 7–15)
BUN SERPL-MCNC: 14.5 MG/DL (ref 8–23)
CALCIUM SERPL-MCNC: 9.7 MG/DL (ref 8.8–10.4)
CHLORIDE SERPL-SCNC: 104 MMOL/L (ref 98–107)
CREAT SERPL-MCNC: 0.92 MG/DL (ref 0.67–1.17)
EGFRCR SERPLBLD CKD-EPI 2021: >90 ML/MIN/1.73M2
GLUCOSE SERPL-MCNC: 98 MG/DL (ref 70–99)
HCO3 SERPL-SCNC: 26 MMOL/L (ref 22–29)
POTASSIUM SERPL-SCNC: 4.1 MMOL/L (ref 3.4–5.3)
PSA SERPL DL<=0.01 NG/ML-MCNC: 0.56 NG/ML (ref 0–3.5)
SODIUM SERPL-SCNC: 141 MMOL/L (ref 135–145)

## 2024-10-15 PROCEDURE — 91320 SARSCV2 VAC 30MCG TRS-SUC IM: CPT | Performed by: FAMILY MEDICINE

## 2024-10-15 PROCEDURE — 90480 ADMN SARSCOV2 VAC 1/ONLY CMP: CPT | Performed by: FAMILY MEDICINE

## 2024-10-15 PROCEDURE — 80048 BASIC METABOLIC PNL TOTAL CA: CPT | Performed by: FAMILY MEDICINE

## 2024-10-15 PROCEDURE — G2211 COMPLEX E/M VISIT ADD ON: HCPCS | Mod: 95 | Performed by: STUDENT IN AN ORGANIZED HEALTH CARE EDUCATION/TRAINING PROGRAM

## 2024-10-15 PROCEDURE — G2211 COMPLEX E/M VISIT ADD ON: HCPCS | Performed by: FAMILY MEDICINE

## 2024-10-15 PROCEDURE — G0103 PSA SCREENING: HCPCS | Performed by: FAMILY MEDICINE

## 2024-10-15 PROCEDURE — 99213 OFFICE O/P EST LOW 20 MIN: CPT | Performed by: FAMILY MEDICINE

## 2024-10-15 PROCEDURE — 36415 COLL VENOUS BLD VENIPUNCTURE: CPT | Performed by: FAMILY MEDICINE

## 2024-10-15 PROCEDURE — 99215 OFFICE O/P EST HI 40 MIN: CPT | Mod: 95 | Performed by: STUDENT IN AN ORGANIZED HEALTH CARE EDUCATION/TRAINING PROGRAM

## 2024-10-15 RX ORDER — VALSARTAN 160 MG/1
160 TABLET ORAL DAILY
Qty: 30 TABLET | Refills: 0 | Status: SHIPPED | OUTPATIENT
Start: 2024-10-15 | End: 2024-11-07

## 2024-10-15 RX ORDER — HYDROCHLOROTHIAZIDE 12.5 MG/1
12.5 TABLET ORAL DAILY
Qty: 30 TABLET | Refills: 0 | Status: SHIPPED | OUTPATIENT
Start: 2024-10-15 | End: 2024-11-07

## 2024-10-15 NOTE — PROGRESS NOTES
"  Assessment & Plan     Hypertension goal BP (blood pressure) < 140/90  sub-optimal control.  We'll have him increase the valsartan dose to 160 mg and hold the HCTZ at 12.5 mg.  He'll follow-up with Medical Assistant (MA)-only appointment for BP recheck in 3 weeks.  - valsartan (DIOVAN) 160 MG tablet  Dispense: 30 tablet; Refill: 0  - hydroCHLOROthiazide 12.5 MG tablet  Dispense: 30 tablet; Refill: 0  - Basic metabolic panel  (Ca, Cl, CO2, Creat, Gluc, K, Na, BUN)    Screening for prostate cancer  - PROSTATE SPEC ANTIGEN SCREEN    Need for vaccination  - COVID-19 12+ (PFIZER)        Kae Huffman is a 59 year old, presenting for the following health issues:  Follow Up and Hypertension        10/15/2024    11:34 AM   Additional Questions   Roomed by deon   Accompanied by self     History of Present Illness       Hypertension: He presents for follow up of hypertension.  He does not check blood pressure  regularly outside of the clinic. Outpatient blood pressures have not been over 140/90. He does not follow a low salt diet. He consumes 0 sweetened beverage(s) daily.He exercises with enough effort to increase his heart rate 30 to 60 minutes per day.  He exercises with enough effort to increase his heart rate 6 days per week.   He is taking medications regularly.     BP Readings from Last 3 Encounters:   10/15/24 (!) 144/87   09/17/24 112/72   12/21/23 118/80      We switched the amlodipine-benazepril to valsartan due to his leg edema.  Since the med change he notes that dizziness and swelling are improved.          Objective    BP (!) 144/87 (BP Location: Right arm, Patient Position: Sitting, Cuff Size: Adult Regular)   Pulse 64   Temp 97.7  F (36.5  C) (Temporal)   Resp 16   Ht 1.85 m (6' 0.84\")   Wt 90.3 kg (199 lb)   SpO2 96%   BMI 26.37 kg/m    Body mass index is 26.37 kg/m .  Physical Exam   GEN:  no apparent distress  LUNGS:  normal respiratory effort, and lungs clear to auscultation bilaterally - no " rales, rhonchi or wheezes  CV: regular rate and rhythm, normal S1 S2, no S3 or S4, and no murmur, click or rub         Signed Electronically by: Melissa Healy MD

## 2024-10-15 NOTE — NURSING NOTE
Patient was contacted to complete the pre-visit call prior to their telephone visit with the provider.     Allergies and medications were reviewed.     I thanked them for their time to cover this information.     Rehana Jewell, SELMA

## 2024-10-16 NOTE — RESULT ENCOUNTER NOTE
David Huffman,  I apologize again for running so late yesterday.  Thank you for your patience.    Your results are great!  Your potassium and kidney function (creatinine and eGFR) are unchanged since starting the valsartan.  And your PSA (prostate cancer blood test) is normal.    Melissa Healy MD

## 2024-11-06 ENCOUNTER — ALLIED HEALTH/NURSE VISIT (OUTPATIENT)
Dept: FAMILY MEDICINE | Facility: CLINIC | Age: 59
End: 2024-11-06
Payer: COMMERCIAL

## 2024-11-06 ENCOUNTER — LAB (OUTPATIENT)
Dept: LAB | Facility: CLINIC | Age: 59
End: 2024-11-06
Payer: COMMERCIAL

## 2024-11-06 VITALS — DIASTOLIC BLOOD PRESSURE: 82 MMHG | SYSTOLIC BLOOD PRESSURE: 124 MMHG

## 2024-11-06 DIAGNOSIS — I10 HYPERTENSION GOAL BP (BLOOD PRESSURE) < 140/90: Primary | ICD-10-CM

## 2024-11-06 DIAGNOSIS — I10 HYPERTENSION GOAL BP (BLOOD PRESSURE) < 140/90: ICD-10-CM

## 2024-11-06 LAB
CREAT SERPL-MCNC: 1.01 MG/DL (ref 0.67–1.17)
EGFRCR SERPLBLD CKD-EPI 2021: 86 ML/MIN/1.73M2
POTASSIUM SERPL-SCNC: 4 MMOL/L (ref 3.4–5.3)

## 2024-11-06 PROCEDURE — 36415 COLL VENOUS BLD VENIPUNCTURE: CPT

## 2024-11-06 PROCEDURE — 99207 PR NO CHARGE NURSE ONLY: CPT

## 2024-11-06 PROCEDURE — 82565 ASSAY OF CREATININE: CPT

## 2024-11-06 PROCEDURE — 84132 ASSAY OF SERUM POTASSIUM: CPT

## 2024-11-06 NOTE — NURSING NOTE
Scottie Eng is a 59 year old patient who comes in today for a Blood Pressure check.  Initial BP:  /82 (BP Location: Right arm, Patient Position: Sitting, Cuff Size: Adult Regular)      Data Unavailable  Disposition: follow-up as previously indicated by provider and results routed to provider

## 2024-11-07 RX ORDER — VALSARTAN AND HYDROCHLOROTHIAZIDE 160; 12.5 MG/1; MG/1
1 TABLET, FILM COATED ORAL DAILY
Qty: 90 TABLET | Refills: 3 | Status: SHIPPED | OUTPATIENT
Start: 2024-11-07

## 2024-11-07 NOTE — RESULT ENCOUNTER NOTE
David Huffman,  Your lab results and blood pressure results are perfect!     Let's combine the valsartan and HCTZ meds into ONE tablet that you will take once daily (preferably in the AM).  I sent that new script to your pharmacy.  Once you start the combined valsartan-HCTZ pill you will stop taking them separately.    Melissa Healy MD

## 2024-11-29 ENCOUNTER — MYC MEDICAL ADVICE (OUTPATIENT)
Dept: FAMILY MEDICINE | Facility: CLINIC | Age: 59
End: 2024-11-29
Payer: COMMERCIAL

## 2024-12-03 NOTE — TELEPHONE ENCOUNTER
Message sent via [employer].    Monica Brown, RN, BSN, PHN  [facility]  [phone]

## 2024-12-18 SDOH — HEALTH STABILITY: PHYSICAL HEALTH: ON AVERAGE, HOW MANY DAYS PER WEEK DO YOU ENGAGE IN MODERATE TO STRENUOUS EXERCISE (LIKE A BRISK WALK)?: 3 DAYS

## 2024-12-18 SDOH — HEALTH STABILITY: PHYSICAL HEALTH: ON AVERAGE, HOW MANY MINUTES DO YOU ENGAGE IN EXERCISE AT THIS LEVEL?: 60 MIN

## 2024-12-18 ASSESSMENT — SOCIAL DETERMINANTS OF HEALTH (SDOH): HOW OFTEN DO YOU GET TOGETHER WITH FRIENDS OR RELATIVES?: ONCE A WEEK

## 2024-12-23 ENCOUNTER — OFFICE VISIT (OUTPATIENT)
Dept: FAMILY MEDICINE | Facility: CLINIC | Age: 59
End: 2024-12-23
Attending: FAMILY MEDICINE
Payer: COMMERCIAL

## 2024-12-23 VITALS
SYSTOLIC BLOOD PRESSURE: 135 MMHG | HEART RATE: 59 BPM | HEIGHT: 72 IN | TEMPERATURE: 97 F | DIASTOLIC BLOOD PRESSURE: 85 MMHG | OXYGEN SATURATION: 99 % | WEIGHT: 200 LBS | RESPIRATION RATE: 16 BRPM | BODY MASS INDEX: 27.09 KG/M2

## 2024-12-23 DIAGNOSIS — Z12.11 SCREEN FOR COLON CANCER: ICD-10-CM

## 2024-12-23 DIAGNOSIS — I10 HYPERTENSION GOAL BP (BLOOD PRESSURE) < 140/90: ICD-10-CM

## 2024-12-23 DIAGNOSIS — D68.52 PROTHROMBIN GENE MUTATION (H): ICD-10-CM

## 2024-12-23 DIAGNOSIS — Z23 NEED FOR VACCINATION: ICD-10-CM

## 2024-12-23 DIAGNOSIS — Z00.00 ROUTINE GENERAL MEDICAL EXAMINATION AT A HEALTH CARE FACILITY: Primary | ICD-10-CM

## 2024-12-23 PROCEDURE — 99213 OFFICE O/P EST LOW 20 MIN: CPT | Mod: 25 | Performed by: FAMILY MEDICINE

## 2024-12-23 PROCEDURE — 99396 PREV VISIT EST AGE 40-64: CPT | Mod: 25 | Performed by: FAMILY MEDICINE

## 2024-12-23 PROCEDURE — 90471 IMMUNIZATION ADMIN: CPT | Performed by: FAMILY MEDICINE

## 2024-12-23 PROCEDURE — 90677 PCV20 VACCINE IM: CPT | Performed by: FAMILY MEDICINE

## 2024-12-23 RX ORDER — VALSARTAN AND HYDROCHLOROTHIAZIDE 160; 12.5 MG/1; MG/1
1 TABLET, FILM COATED ORAL DAILY
Qty: 90 TABLET | Refills: 3 | Status: CANCELLED | OUTPATIENT
Start: 2024-12-23

## 2024-12-23 ASSESSMENT — PAIN SCALES - GENERAL: PAINLEVEL_OUTOF10: NO PAIN (0)

## 2024-12-23 NOTE — PROGRESS NOTES
Preventive Care Visit  [facility]  Melissa Healy MD, Family Medicine  Dec 23, 2024      Assessment & Plan     Routine general medical examination at a health care facility  Reviewed/updated Health Maintenance     Hypertension goal BP (blood pressure) < 140/90  stable/well controlled - Continue current medication regimen.     Screen for colon cancer  He is due for colonoscopy and has upcoming GI appointment to see if he can do his EGD and colonoscopy together.     Prothrombin gene mutation (H)  I reviewed circumstances when he should take a Xarelto and reviewed that each dose will cover him for 24 hours.  See patient instructions.    Need for vaccination  - PNEUMOCOCCAL 20 VALENT CONJUGATE (PREVNAR 20)        Counseling  Appropriate preventive services were addressed with this patient via screening, questionnaire, or discussion as appropriate for fall prevention, nutrition, physical activity, Tobacco-use cessation, social engagement, weight loss and cognition.  Checklist reviewing preventive services available has been given to the patient.  Reviewed patient's diet, addressing concerns and/or questions.   He is at risk for lack of exercise and has been provided with information to increase physical activity for the benefit of his well-being.   He is at risk for psychosocial distress and has been provided with information to reduce risk.       See Patient Instructions    Kae Huffman is a 59 year old, presenting for the following:  Physical        12/23/2024     6:53 AM   Additional Questions   Roomed by Sakina Peña          HPI    H/O DVT and PT gene mutation (heterozygous) - Saw Heme and they felt he could discontinue daily Xarelto and use an intermittent strategy, taking it during higher risk periods.  He does not recall being told to take it intermittently.    Health Care Directive  Patient does not have a Health Care Directive: Discussed advance care planning with patient;  however, patient declined at this time.        12/18/2024   General Health   How would you rate your overall physical health? Good   Feel stress (tense, anxious, or unable to sleep) To some extent   (!) STRESS CONCERN      12/18/2024   Nutrition   Three or more servings of calcium each day? Yes   Diet: Regular (no restrictions)   How many servings of fruit and vegetables per day? (!) 2-3   How many sweetened beverages each day? 0-1         12/18/2024   Exercise   Days per week of moderate/strenous exercise 3 days   Average minutes spent exercising at this level 60 min         12/18/2024   Social Factors   Frequency of gathering with friends or relatives Once a week   Worry food won't last until get money to buy more No   Food not last or not have enough money for food? No   Do you have housing? (Housing is defined as stable permanent housing and does not include staying ouside in a car, in a tent, in an abandoned building, in an overnight shelter, or couch-surfing.) Yes   Are you worried about losing your housing? No   Lack of transportation? No   Unable to get utilities (heat,electricity)? No         12/18/2024   Fall Risk   Fallen 2 or more times in the past year? No   Trouble with walking or balance? No          12/18/2024   Dental   Dentist two times every year? Yes         12/18/2024   TB Screening   Were you born outside of the [place]? No         Today's PHQ-2 Score:       12/22/2024     9:33 AM   PHQ-2 ( 1999 [employer])   Q1: Little interest or pleasure in doing things 0   Q2: Feeling down, depressed or hopeless 0   PHQ-2 Score 0    Q1: Little interest or pleasure in doing things Not at all   Q2: Feeling down, depressed or hopeless Not at all   PHQ-2 Score 0       Patient-reported           12/18/2024   Substance Use   Alcohol more than 3/day or more than 7/wk No   Do you use any other substances recreationally? No     Social History     Tobacco Use    Smoking status: Former     Current packs/day: 0.00     Types:  Cigarettes     Start date: 1980     Quit date: 1996     Years since quittin.7    Smokeless tobacco: Former     Quit date: 1982    Tobacco comments:     quit 25 yrs ago   Vaping Use    Vaping status: Never Used   Substance Use Topics    Alcohol use: Yes     Alcohol/week: 3.0 standard drinks of alcohol     Comment: social    Drug use: Not Currently           2024   STI Screening   New sexual partner(s) since last STI/HIV test? No     Last PSA:   Prostate Specific Antigen Screen   Date Value Ref Range Status   10/15/2024 0.56 0.00 - 3.50 ng/mL Final   2022 0.69 0.00 - 4.00 ug/L Final     ASCVD Risk   The 10-year ASCVD risk score (Rojelio TOUSSAINT, et al., 2019) is: 10.6%    Values used to calculate the score:      Age: 59 years      Sex: Male      Is Non- : No      Diabetic: No      Tobacco smoker: No      Systolic Blood Pressure: 135 mmHg      Is BP treated: Yes      HDL Cholesterol: 50 mg/dL      Total Cholesterol: 211 mg/dL         Reviewed and updated as needed this visit by Provider   Tobacco  Allergies  Meds  Problems  Med Hx  Surg Hx  Fam Hx            BP Readings from Last 3 Encounters:   24 135/85   24 124/82   10/15/24 (!) 144/87    Wt Readings from Last 3 Encounters:   24 90.7 kg (200 lb)   10/15/24 90.3 kg (199 lb)   10/15/24 90.3 kg (199 lb)               Patient Active Problem List   Diagnosis    Gant's esophagus    GERD (gastroesophageal reflux disease)    Bilateral hip pain    Chronic pain of left knee    Prothrombin gene mutation (H)    Acute deep vein thrombosis (DVT) of distal vein of left lower extremity (H)    Hypersomnia    Periodic limb movement disorder    Restless legs syndrome (RLS)    Hypertension goal BP (blood pressure) < 140/90    Chronic venous insufficiency    Bilateral lower extremity edema     Past Surgical History:   Procedure Laterality Date    ARTHROSCOPY KNEE WITH MENISCAL REPAIR Left 2022     "Procedure: left knee examination under anesthesia, knee arthroscopy, meniscectomy;  Surgeon: Benny Carrasquillo MD;  Location: [facility] OR    AS REPAIR CRUCIATE LIGAMENT,KNEE Left     COLONOSCOPY  2014, 2017, 2019    SEPTOPLASTY, ENDOSCOPIC SINUS SURGERY, COMBINED  1978    STRIP VEIN      [facility] ORAL SURGERY PROCEDURE         Social History     Tobacco Use    Smoking status: Former     Current packs/day: 0.00     Types: Cigarettes     Start date: 1980     Quit date: 1996     Years since quittin.7    Smokeless tobacco: Former     Quit date: 1982    Tobacco comments:     quit 25 yrs ago   Substance Use Topics    Alcohol use: Yes     Alcohol/week: 3.0 standard drinks of alcohol     Comment: social     Family History   Problem Relation Age of Onset    Ovarian Cancer Mother     Osteoporosis Mother     Cerebrovascular Disease Mother     Cervical Cancer Mother     Other Cancer Mother         Uterine    Esophageal Cancer Father     Other Cancer Father         Esophageal    Diabetes Type 2  Brother          of COVID    Sleep Apnea Brother     Tuberculosis Maternal Grandfather     Myocardial Infarction Paternal Grandmother 80    Alcoholism Paternal Grandfather     Heart Disease Paternal Uncle                 Objective    Exam  /85 (BP Location: Right arm, Patient Position: Sitting, Cuff Size: Adult Regular)   Pulse 59   Temp 97  F (36.1  C) (Temporal)   Resp 16   Ht 1.84 m (6' 0.44\")   Wt 90.7 kg (200 lb)   SpO2 99%   BMI 26.80 kg/m     Estimated body mass index is 26.8 kg/m  as calculated from the following:    Height as of this encounter: 1.84 m (6' 0.44\").    Weight as of this encounter: 90.7 kg (200 lb).    Physical Exam  GENERAL: healthy, alert and no distress  EYES: Eyes grossly normal to inspection, conjunctivae and sclerae normal  HENT: ear canals and TM's normal  NECK: no adenopathy, no asymmetry, masses, or scars and thyroid normal to palpation  RESP: lungs clear to " auscultation - no rales, rhonchi or wheezes  CV: regular rate and rhythm, normal S1 S2, no S3 or S4, no murmur, click or rub  ABDOMEN: soft, nontender, no hepatosplenomegaly, no masses  MS: no gross musculoskeletal defects noted, no edema  SKIN: no suspicious lesions or rashes  NEURO: Grossly normal strength and tone, mentation intact and speech normal  PSYCH: mentation appears normal, affect normal/bright        Signed Electronically by: Melissa Healy MD

## 2024-12-23 NOTE — PATIENT INSTRUCTIONS
Use anticoagulation in high risk situations. This includes, but is not limited to, long trips (e.g. car drives or plane flights longer than 5 hours), postsurgical periods, hospitalizations, trauma and immobilization. After surgery, he should receive anticoagulation for 6 weeks post-operatively.     To find a list of reliable, validated home blood pressure monitors see:  www.validatebp.org     Use appropriate technique when measuring home blood pressure readings:  1) Avoid caffeine, tobacco, and exercise for 30 minutes prior to checking blood pressure.  2) Empty your bladder.  3) Sit quietly for at least 5 minutes.  4) Place cuff directly against skin (not over clothing).  5) While taking blood pressure keep feet flat on the floor, back supported against back of chair, arm supported near level of heart. (Don't cross legs or feet, don't sit away from chair back and don't hold arm out in the air.)  6) Be quiet while the machine is measuring your blood pressure.  (Don't talk, laugh, or sing while BP is being checked.)  7) Sit for 5 more minutes then take a 2nd reading.    Patient Education   If you decide to try glucosamine 1500 mg per day. This is usually split into 750 mg twice daily.    Preventive Care Advice   This is general advice given by our system to help you stay healthy. However, your care team may have specific advice just for you. Please talk to your care team about your preventive care needs.  Nutrition  Eat 5 or more servings of fruits and vegetables each day.  Try wheat bread, brown rice and whole grain pasta (instead of white bread, rice, and pasta).  Get enough calcium and vitamin D. Check the label on foods and aim for 100% of the RDA (recommended daily allowance).  Lifestyle  Exercise at least 150 minutes each week  (30 minutes a day, 5 days a week).  Do muscle strengthening activities 2 days a week. These help control your weight and prevent disease.  No smoking.  Wear sunscreen to prevent skin  cancer.  Have a dental exam and cleaning every 6 months.  Yearly exams  See your health care team every year to talk about:  Any changes in your health.  Any medicines your care team has prescribed.  Preventive care, family planning, and ways to prevent chronic diseases.  Shots (vaccines)   HPV shots (up to age 26), if you've never had them before.  Hepatitis B shots (up to age 59), if you've never had them before.  COVID-19 shot: Get this shot when it's due.  Flu shot: Get a flu shot every year.  Tetanus shot: Get a tetanus shot every 10 years.  Pneumococcal, hepatitis A, and RSV shots: Ask your care team if you need these based on your risk.  Shingles shot (for age 50 and up)  General health tests  Diabetes screening:  Starting at age 35, Get screened for diabetes at least every 3 years.  If you are younger than age 35, ask your care team if you should be screened for diabetes.  Cholesterol test: At age 39, start having a cholesterol test every 5 years, or more often if advised.  Bone density scan (DEXA): At age 50, ask your care team if you should have this scan for osteoporosis (brittle bones).  Hepatitis C: Get tested at least once in your life.  STIs (sexually transmitted infections)  Before age 24: Ask your care team if you should be screened for STIs.  After age 24: Get screened for STIs if you're at risk. You are at risk for STIs (including HIV) if:  You are sexually active with more than one person.  You don't use condoms every time.  You or a partner was diagnosed with a sexually transmitted infection.  If you are at risk for HIV, ask about PrEP medicine to prevent HIV.  Get tested for HIV at least once in your life, whether you are at risk for HIV or not.  Cancer screening tests  Cervical cancer screening: If you have a cervix, begin getting regular cervical cancer screening tests starting at age 21.  Breast cancer scan (mammogram): If you've ever had breasts, begin having regular mammograms starting at  age 40. This is a scan to check for breast cancer.  Colon cancer screening: It is important to start screening for colon cancer at age 45.  Have a colonoscopy test every 10 years (or more often if you're at risk) Or, ask your provider about stool tests like a FIT test every year or Cologuard test every 3 years.  To learn more about your testing options, visit:   .  For help making a decision, visit:   https://bit.ly/wr50000.  Prostate cancer screening test: If you have a prostate, ask your care team if a prostate cancer screening test (PSA) at age 55 is right for you.  Lung cancer screening: If you are a current or former smoker ages 50 to 80, ask your care team if ongoing lung cancer screenings are right for you.  For informational purposes only. Not to replace the advice of your health care provider. Copyright   2023 [employer]. All rights reserved. Clinically reviewed by the [facility] Transitions Program. [employer] 095609 - REV 01/24.  Learning About Stress  What is stress?     Stress is your body's response to a hard situation. Your body can have a physical, emotional, or mental response. Stress is a fact of life for most people, and it affects everyone differently. What causes stress for you may not be stressful for someone else.  A lot of things can cause stress. You may feel stress when you go on a job interview, take a test, or run a race. This kind of short-term stress is normal and even useful. It can help you if you need to work hard or react quickly. For example, stress can help you finish an important job on time.  Long-term stress is caused by ongoing stressful situations or events. Examples of long-term stress include long-term health problems, ongoing problems at work, or conflicts in your family. Long-term stress can harm your health.  How does stress affect your health?  When you are stressed, your body responds as though you are in danger. It makes hormones that speed up  your heart, make you breathe faster, and give you a burst of energy. This is called the fight-or-flight stress response. If the stress is over quickly, your body goes back to normal and no harm is done.  But if stress happens too often or lasts too long, it can have bad effects. Long-term stress can make you more likely to get sick, and it can make symptoms of some diseases worse. If you tense up when you are stressed, you may develop neck, shoulder, or low back pain. Stress is linked to high blood pressure and heart disease.  Stress also harms your emotional health. It can make you valle, tense, or depressed. Your relationships may suffer, and you may not do well at work or school.  What can you do to manage stress?  You can try these things to help manage stress:   Do something active. Exercise or activity can help reduce stress. Walking is a great way to get started. Even everyday activities such as housecleaning or yard work can help.  Try yoga or norma chi. These techniques combine exercise and meditation. You may need some training at first to learn them.  Do something you enjoy. For example, listen to music or go to a movie. Practice your hobby or do volunteer work.  Meditate. This can help you relax, because you are not worrying about what happened before or what may happen in the future.  Do guided imagery. Imagine yourself in any setting that helps you feel calm. You can use online videos, books, or a teacher to guide you.  Do breathing exercises. For example:  From a standing position, bend forward from the waist with your knees slightly bent. Let your arms dangle close to the floor.  Breathe in slowly and deeply as you return to a standing position. Roll up slowly and lift your head last.  Hold your breath for just a few seconds in the standing position.  Breathe out slowly and bend forward from the waist.  Let your feelings out. Talk, laugh, cry, and express anger when you need to. Talking with supportive  "friends or family, a counselor, or a felix leader about your feelings is a healthy way to relieve stress. Avoid discussing your feelings with people who make you feel worse.  Write. It may help to write about things that are bothering you. This helps you find out how much stress you feel and what is causing it. When you know this, you can find better ways to cope.  What can you do to prevent stress?  You might try some of these things to help prevent stress:  Manage your time. This helps you find time to do the things you want and need to do.  Get enough sleep. Your body recovers from the stresses of the day while you are sleeping.  Get support. Your family, friends, and community can make a difference in how you experience stress.  Limit your news feed. Avoid or limit time on social media or news that may make you feel stressed.  Do something active. Exercise or activity can help reduce stress. Walking is a great way to get started.  Where can you learn more?  Go to https://www.[employer].net/patiented  Enter N032 in the search box to learn more about \"Learning About Stress.\"  Current as of: October 24, 2023  Content Version: 14.3    2024 [employer].   Care instructions adapted under license by your healthcare professional. If you have questions about a medical condition or this instruction, always ask your healthcare professional. [employer] disclaims any warranty or liability for your use of this information.       "

## 2024-12-23 NOTE — NURSING NOTE
Prior to immunization administration, verified patients identity using patient s name and date of birth. Please see Immunization Activity for additional information.     Screening Questionnaire for Adult Immunization    Are you sick today?   No   Do you have allergies to medications, food, a vaccine component or latex?   No   Have you ever had a serious reaction after receiving a vaccination?   No   Do you have a long-term health problem with heart, lung, kidney, or metabolic disease (e.g., diabetes), asthma, a blood disorder, no spleen, complement component deficiency, a cochlear implant, or a spinal fluid leak?  Are you on long-term aspirin therapy?   No   Do you have cancer, leukemia, HIV/AIDS, or any other immune system problem?   No   Do you have a parent, brother, or sister with an immune system problem?   No   In the past 3 months, have you taken medications that affect  your immune system, such as prednisone, other steroids, or anticancer drugs; drugs for the treatment of rheumatoid arthritis, Crohn s disease, or psoriasis; or have you had radiation treatments?   No   Have you had a seizure, or a brain or other nervous system problem?   No   During the past year, have you received a transfusion of blood or blood    products, or been given immune (gamma) globulin or antiviral drug?   No   For women: Are you pregnant or is there a chance you could become       pregnant during the next month?   No   Have you received any vaccinations in the past 4 weeks?   No     Immunization questionnaire answers were all negative.      Patient instructed to remain in clinic for 15 minutes afterwards, and to report any adverse reactions.     Screening performed by Willis Sharpe MA on 12/23/2024 at 7:41 AM.

## 2025-01-23 ENCOUNTER — TRANSFERRED RECORDS (OUTPATIENT)
Dept: HEALTH INFORMATION MANAGEMENT | Facility: CLINIC | Age: 60
End: 2025-01-23
Payer: COMMERCIAL

## 2025-06-11 NOTE — TELEPHONE ENCOUNTER
DIAGNOSIS: had meniscectomy in 2023(or 2022) and over the last 2-3 months of playing tennis, my knee is stiff and sore     APPOINTMENT DATE: 6/14/25   NOTES STATUS DETAILS   OFFICE NOTE from other specialist Internal 8/15/22, 6/29/22, 6/22/22  [facility]    7/19/22  Monet DE LEON   OPERATIVE REPORT Internal 8/5/22  Sara WILSON   MEDICATION LIST Internal    MRI Internal 6/28/22  MR Knee Left   XRAYS (IMAGES & REPORTS) Internal 6/22/22  XR Knee Left

## 2025-06-14 ENCOUNTER — PRE VISIT (OUTPATIENT)
Dept: ORTHOPEDICS | Facility: CLINIC | Age: 60
End: 2025-06-14

## 2025-06-14 ENCOUNTER — OFFICE VISIT (OUTPATIENT)
Dept: ORTHOPEDICS | Facility: CLINIC | Age: 60
End: 2025-06-14
Payer: COMMERCIAL

## 2025-06-14 ENCOUNTER — RESULTS FOLLOW-UP (OUTPATIENT)
Dept: ORTHOPEDICS | Facility: CLINIC | Age: 60
End: 2025-06-14

## 2025-06-14 ENCOUNTER — ANCILLARY PROCEDURE (OUTPATIENT)
Dept: GENERAL RADIOLOGY | Facility: CLINIC | Age: 60
End: 2025-06-14
Attending: FAMILY MEDICINE
Payer: COMMERCIAL

## 2025-06-14 DIAGNOSIS — M17.12 PRIMARY OSTEOARTHRITIS OF LEFT KNEE: Primary | ICD-10-CM

## 2025-06-14 DIAGNOSIS — G89.29 CHRONIC PAIN OF LEFT KNEE: ICD-10-CM

## 2025-06-14 DIAGNOSIS — M25.562 CHRONIC PAIN OF LEFT KNEE: ICD-10-CM

## 2025-06-14 PROCEDURE — 73562 X-RAY EXAM OF KNEE 3: CPT | Mod: LT | Performed by: RADIOLOGY

## 2025-06-14 NOTE — PROGRESS NOTES
ASSESSMENT/PLAN:    (M17.12) Primary osteoarthritis of left knee  (primary encounter diagnosis)  Comment: reviewed exam, imaging, tx plan at length; he has early arthritis from previous ACL/ meniscal surgery; exam was quite reassuring as he had no meniscal signs or joint line tenderness; encouraged him to rehab; can use topical nsaid prn; he wishes to explore PT options at his chiropractor; script printed; he will follow-up prn; precautions/ anticipatory guidance/ return to play guidelines discussed  Plan: Physical Therapy  Referral          (M25.562,  G89.29) Chronic pain of left knee  Comment: see above  Plan: XR Knee Left 3 Views, Physical Therapy  Referral          Phoenix Valdez MD  June 14, 2025  9:51 AM        Pt is a 59 year old male here today for:     HPI:   Left Knee pain : Patient reports he is experiencing left knee pain over lateral knee joint and IT band along stiffness during and after playing tennis. He states he completed a tennis league which just ended at the end of May. In the Fall of 2024 patient notes he may have torn his meniscus while playing tennis, he notes he stopped suddenly and the rest of his body kept moving forward and he felt a sharp pain in the lateral left knee.  Patient enjoys playing tennis, golf, and hockey  Duration? 2-3 months   Injury/ Inciting activity? Painful and stiffness with tennis   Pop? No   Swelling? None   Limited motion? None   Locking/ Catching? None   Giving way/ instability? None   Imaging? XR today 6/14/25   Treatment?  Chiropractor care, shockwave therapy over the lateral knee, IT band. Notes improvement the day of treatment but not long term relief.    MRI 6/2022:  IMPRESSION:   1. Bucket-handle tear medial meniscus with displacement of much of the  body into the intercondylar notch.  2. Marked chondromalacia articular cartilage femoral trochlea with  linear fissuring of the median ridge of the patella.  3. Intact ACL graft.  4. Fissuring  with full-thickness cartilage loss over weightbearing  surface of lateral femoral and tibial condyles without evidence of  lateral meniscal tear.  5. Knee effusion. Probable synovitis.    Past Medical History:   Diagnosis Date    Gant's esophagus     followed by [facility]    GERD (gastroesophageal reflux disease)     Hypertension kellen    Leg DVT (deep venous thromboembolism), acute (H) 09/2022      Past Surgical History:   Procedure Laterality Date    ARTHROSCOPY KNEE WITH MENISCAL REPAIR Left 8/5/2022    Procedure: left knee examination under anesthesia, knee arthroscopy, meniscectomy;  Surgeon: Benny Carrasquillo MD;  [place] [facility] [place]    [place] REPAIR CRUCIATE LIGAMENT,KNEE Left 2005    COLONOSCOPY  2014, 2017, 2019    SEPTOPLASTY, ENDOSCOPIC SINUS SURGERY, COMBINED  1978    STRIP VEIN      ZZC ORAL SURGERY PROCEDURE  1981      Current Outpatient Medications   Medication Sig Dispense Refill    hydrocortisone 2.5 % cream Apply topically 2 times daily Do not use for more than 10 days on one area of skin. 30 g 1    omeprazole (PRILOSEC) 20 MG DR capsule       valsartan-hydrochlorothiazide (DIOVAN HCT) 160-12.5 MG tablet Take 1 tablet by mouth daily. 90 tablet 3      Allergies   Allergen Reactions    Seasonal Allergies       ROS:   Gen- no fevers/chills   Rheum - no morning stiffness   Derm - no rash/ redness   Neuro - no numbness, no tingling   Remainder of ROS negative.     Exam:   There were no vitals taken for this visit.       L Knee:   ROM: 0-130; Crepitus: YES; + operative scars  Effusion: NO ; Swelling: NO   Strength: Full in flexion/ extension   Tenderness: Patella - NO Medial joint line - NO; Lateral joint line - NO; Quad tendon - NO; Patellar tendon- NO; Hamstring - No.   Cruciates: anterior drawer - neg/posterior drawer -neg. Lachman - neg   Collaterals: varus -neg/valgus -neg.   Patella: patellar compression - neg; single leg bend- POS - for lateral joint pain   Meniscus: Raquel - neg; Thessaly  - neg   Maneuvers: Devang - neg     Xray of L knee on June 14, 2025 at [facility] [place] location - films personally reviewed with patient at time of visit     My impression: moderate lateral and patellofemoral OA; +ACL tunnels

## 2025-06-14 NOTE — LETTER
6/14/2025      RE: Scottie Eng  [address]  [facility] [address]     Dear Colleague,    Thank you for referring your patient, Scottie Eng, to the [facility] [place]. Please see a copy of my visit note below.    ASSESSMENT/PLAN:    (M17.12) Primary osteoarthritis of left knee  (primary encounter diagnosis)  Comment: reviewed exam, imaging, tx plan at length; he has early arthritis from previous ACL/ meniscal surgery; exam was quite reassuring as he had no meniscal signs or joint line tenderness; encouraged him to rehab; can use topical nsaid prn; he wishes to explore PT options at his chiropractor; script printed; he will follow-up prn; precautions/ anticipatory guidance/ return to play guidelines discussed  Plan: Physical Therapy  Referral          (M25.562,  G89.29) Chronic pain of left knee  Comment: see above  Plan: XR Knee Left 3 Views, Physical Therapy  Referral          Phoenix Valdez MD  June 14, 2025  9:51 AM        Pt is a 59 year old male here today for:     HPI:   Left Knee pain : Patient reports he is experiencing left knee pain over lateral knee joint and IT band along stiffness during and after playing tennis. He states he completed a tennis league which just ended at the end of May. In the Fall of 2024 patient notes he may have torn his meniscus while playing tennis, he notes he stopped suddenly and the rest of his body kept moving forward and he felt a sharp pain in the lateral left knee.  Patient enjoys playing tennis, golf, and hockey  Duration? 2-3 months   Injury/ Inciting activity? Painful and stiffness with tennis   Pop? No   Swelling? None   Limited motion? None   Locking/ Catching? None   Giving way/ instability? None   Imaging? XR today 6/14/25   Treatment?  Chiropractor care, shockwave therapy over the lateral knee, IT band. Notes improvement the day of treatment but not long term relief.    MRI  6/2022:  IMPRESSION:   1. Bucket-handle tear medial meniscus with displacement of much of the  body into the intercondylar notch.  2. Marked chondromalacia articular cartilage femoral trochlea with  linear fissuring of the median ridge of the patella.  3. Intact ACL graft.  4. Fissuring with full-thickness cartilage loss over weightbearing  surface of lateral femoral and tibial condyles without evidence of  lateral meniscal tear.  5. Knee effusion. Probable synovitis.    Past Medical History:   Diagnosis Date     Gant's esophagus     followed by [facility]     GERD (gastroesophageal reflux disease)      Hypertension kellen     Leg DVT (deep venous thromboembolism), acute (H) 09/2022      Past Surgical History:   Procedure Laterality Date     ARTHROSCOPY KNEE WITH MENISCAL REPAIR Left 8/5/2022    Procedure: left knee examination under anesthesia, knee arthroscopy, meniscectomy;  Surgeon: Benny Carrasquillo MD;  Location: [facility] OR     AS REPAIR CRUCIATE LIGAMENT,KNEE Left 2005     COLONOSCOPY  2014, 2017, 2019     SEPTOPLASTY, ENDOSCOPIC SINUS SURGERY, COMBINED  1978     STRIP VEIN       ZZC ORAL SURGERY PROCEDURE  1981      Current Outpatient Medications   Medication Sig Dispense Refill     hydrocortisone 2.5 % cream Apply topically 2 times daily Do not use for more than 10 days on one area of skin. 30 g 1     omeprazole (PRILOSEC) 20 MG DR capsule        valsartan-hydrochlorothiazide (DIOVAN HCT) 160-12.5 MG tablet Take 1 tablet by mouth daily. 90 tablet 3      Allergies   Allergen Reactions     Seasonal Allergies       ROS:   Gen- no fevers/chills   Rheum - no morning stiffness   Derm - no rash/ redness   Neuro - no numbness, no tingling   Remainder of ROS negative.     Exam:   There were no vitals taken for this visit.       L Knee:   ROM: 0-130; Crepitus: YES; + operative scars  Effusion: NO ; Swelling: NO   Strength: Full in flexion/ extension   Tenderness: Patella - NO Medial joint line - NO; Lateral  joint line - NO; Quad tendon - NO; Patellar tendon- NO; Hamstring - No.   Cruciates: anterior drawer - neg/posterior drawer -neg. Lachman - neg   Collaterals: varus -neg/valgus -neg.   Patella: patellar compression - neg; single leg bend- POS - for lateral joint pain   Meniscus: Raquel - neg; Thessaly - neg   Maneuvers: Devang - neg         Again, thank you for allowing me to participate in the care of your patient.      Sincerely,    Phoenix Valdez MD

## 2025-06-15 ENCOUNTER — MYC MEDICAL ADVICE (OUTPATIENT)
Dept: FAMILY MEDICINE | Facility: CLINIC | Age: 60
End: 2025-06-15
Payer: COMMERCIAL

## 2025-06-17 NOTE — TELEPHONE ENCOUNTER
Dr. Monet HERNANDEZ only BP at most resent sports med visit was 130/85 and within goal.    ANKUR Chapman, BSN, PHN, AMB-BC (she/her)  [facility] RN

## 2025-07-03 ENCOUNTER — MYC MEDICAL ADVICE (OUTPATIENT)
Dept: HEMATOLOGY | Facility: CLINIC | Age: 60
End: 2025-07-03
Payer: COMMERCIAL

## 2025-07-03 DIAGNOSIS — I82.4Z2 ACUTE DEEP VEIN THROMBOSIS (DVT) OF DISTAL VEIN OF LEFT LOWER EXTREMITY (H): ICD-10-CM

## 2025-07-03 DIAGNOSIS — Z86.718 PERSONAL HISTORY OF DVT (DEEP VEIN THROMBOSIS): Primary | ICD-10-CM

## (undated) DEVICE — LINEN GOWN XLG 5407

## (undated) DEVICE — BUR ARTHREX COOLCUT SABRE 4.0MMX13CM AR-8400SR

## (undated) DEVICE — SOL NACL 0.9% IRRIG 3000ML BAG 2B7477

## (undated) DEVICE — PREP DURAPREP REMOVER 4OZ 8611

## (undated) DEVICE — GLOVE PROTEXIS BLUE W/NEU-THERA 8.0  2D73EB80

## (undated) DEVICE — ESU PENCIL SMOKE EVAC W/ROCKER SWITCH 0703-047-000

## (undated) DEVICE — ESU GROUND PAD ADULT W/CORD E7507

## (undated) DEVICE — TUBING SYSTEM ARTHREX PATIENT REDEUCE AR-6421

## (undated) DEVICE — PREP DURAPREP 26ML APL 8630

## (undated) DEVICE — SUCTION MANIFOLD NEPTUNE 2 SYS 4 PORT 0702-020-000

## (undated) DEVICE — LINEN ORTHO PACK 5446

## (undated) DEVICE — PACK ARTHROSCOPY CUSTOM ASC

## (undated) DEVICE — PAD ARMBOARD FOAM EGGCRATE COVIDEN 3114367

## (undated) DEVICE — GLOVE PROTEXIS POWDER FREE SMT 8.0  2D72PT80X

## (undated) RX ORDER — LIDOCAINE HYDROCHLORIDE 20 MG/ML
INJECTION, SOLUTION EPIDURAL; INFILTRATION; INTRACAUDAL; PERINEURAL
Status: DISPENSED
Start: 2022-08-05

## (undated) RX ORDER — DEXAMETHASONE SODIUM PHOSPHATE 4 MG/ML
INJECTION, SOLUTION INTRA-ARTICULAR; INTRALESIONAL; INTRAMUSCULAR; INTRAVENOUS; SOFT TISSUE
Status: DISPENSED
Start: 2022-08-05

## (undated) RX ORDER — OXYCODONE HYDROCHLORIDE 5 MG/1
TABLET ORAL
Status: DISPENSED
Start: 2022-08-05

## (undated) RX ORDER — HYDROMORPHONE HYDROCHLORIDE 1 MG/ML
INJECTION, SOLUTION INTRAMUSCULAR; INTRAVENOUS; SUBCUTANEOUS
Status: DISPENSED
Start: 2022-08-05

## (undated) RX ORDER — GABAPENTIN 300 MG/1
CAPSULE ORAL
Status: DISPENSED
Start: 2022-08-05

## (undated) RX ORDER — ACETAMINOPHEN 325 MG/1
TABLET ORAL
Status: DISPENSED
Start: 2022-08-05

## (undated) RX ORDER — PROPOFOL 10 MG/ML
INJECTION, EMULSION INTRAVENOUS
Status: DISPENSED
Start: 2022-08-05

## (undated) RX ORDER — EPINEPHRINE 1 MG/ML
INJECTION, SOLUTION INTRAMUSCULAR; SUBCUTANEOUS
Status: DISPENSED
Start: 2022-08-05

## (undated) RX ORDER — FENTANYL CITRATE 50 UG/ML
INJECTION, SOLUTION INTRAMUSCULAR; INTRAVENOUS
Status: DISPENSED
Start: 2022-08-05

## (undated) RX ORDER — BUPIVACAINE HYDROCHLORIDE 2.5 MG/ML
INJECTION, SOLUTION EPIDURAL; INFILTRATION; INTRACAUDAL
Status: DISPENSED
Start: 2022-08-05

## (undated) RX ORDER — CEFAZOLIN SODIUM 2 G/50ML
SOLUTION INTRAVENOUS
Status: DISPENSED
Start: 2022-08-05

## (undated) RX ORDER — ONDANSETRON 2 MG/ML
INJECTION INTRAMUSCULAR; INTRAVENOUS
Status: DISPENSED
Start: 2022-08-05